# Patient Record
Sex: FEMALE | Race: OTHER | HISPANIC OR LATINO
[De-identification: names, ages, dates, MRNs, and addresses within clinical notes are randomized per-mention and may not be internally consistent; named-entity substitution may affect disease eponyms.]

---

## 2017-01-19 ENCOUNTER — APPOINTMENT (OUTPATIENT)
Dept: NEUROSURGERY | Facility: HOSPITAL | Age: 57
End: 2017-01-19

## 2017-01-19 ENCOUNTER — OTHER (OUTPATIENT)
Age: 57
End: 2017-01-19

## 2017-01-25 ENCOUNTER — APPOINTMENT (OUTPATIENT)
Dept: CT IMAGING | Facility: CLINIC | Age: 57
End: 2017-01-25

## 2017-01-25 ENCOUNTER — OUTPATIENT (OUTPATIENT)
Dept: OUTPATIENT SERVICES | Facility: HOSPITAL | Age: 57
LOS: 1 days | End: 2017-01-25
Payer: MEDICAID

## 2017-01-25 VITALS
DIASTOLIC BLOOD PRESSURE: 84 MMHG | SYSTOLIC BLOOD PRESSURE: 135 MMHG | HEART RATE: 96 BPM | OXYGEN SATURATION: 100 % | HEIGHT: 61.81 IN | RESPIRATION RATE: 18 BRPM | TEMPERATURE: 98 F | WEIGHT: 132.28 LBS

## 2017-01-25 DIAGNOSIS — Z98.891 HISTORY OF UTERINE SCAR FROM PREVIOUS SURGERY: Chronic | ICD-10-CM

## 2017-01-25 DIAGNOSIS — Z78.9 OTHER SPECIFIED HEALTH STATUS: ICD-10-CM

## 2017-01-25 DIAGNOSIS — S09.91XA UNSPECIFIED INJURY OF EAR, INITIAL ENCOUNTER: Chronic | ICD-10-CM

## 2017-01-25 DIAGNOSIS — M47.812 SPONDYLOSIS WITHOUT MYELOPATHY OR RADICULOPATHY, CERVICAL REGION: ICD-10-CM

## 2017-01-25 DIAGNOSIS — Z01.818 ENCOUNTER FOR OTHER PREPROCEDURAL EXAMINATION: ICD-10-CM

## 2017-01-25 LAB
ANION GAP SERPL CALC-SCNC: 18 MMOL/L — HIGH (ref 5–17)
BLD GP AB SCN SERPL QL: NEGATIVE — SIGNIFICANT CHANGE UP
BUN SERPL-MCNC: 12 MG/DL — SIGNIFICANT CHANGE UP (ref 7–23)
CALCIUM SERPL-MCNC: 10.3 MG/DL — SIGNIFICANT CHANGE UP (ref 8.4–10.5)
CHLORIDE SERPL-SCNC: 103 MMOL/L — SIGNIFICANT CHANGE UP (ref 96–108)
CO2 SERPL-SCNC: 21 MMOL/L — LOW (ref 22–31)
CREAT SERPL-MCNC: 0.74 MG/DL — SIGNIFICANT CHANGE UP (ref 0.5–1.3)
GLUCOSE SERPL-MCNC: 114 MG/DL — HIGH (ref 70–99)
HCT VFR BLD CALC: 36.3 % — SIGNIFICANT CHANGE UP (ref 34.5–45)
HGB BLD-MCNC: 12.1 G/DL — SIGNIFICANT CHANGE UP (ref 11.5–15.5)
MCHC RBC-ENTMCNC: 29.5 PG — SIGNIFICANT CHANGE UP (ref 27–34)
MCHC RBC-ENTMCNC: 33.3 GM/DL — SIGNIFICANT CHANGE UP (ref 32–36)
MCV RBC AUTO: 88.5 FL — SIGNIFICANT CHANGE UP (ref 80–100)
PLATELET # BLD AUTO: 301 K/UL — SIGNIFICANT CHANGE UP (ref 150–400)
POTASSIUM SERPL-MCNC: 4.4 MMOL/L — SIGNIFICANT CHANGE UP (ref 3.5–5.3)
POTASSIUM SERPL-SCNC: 4.4 MMOL/L — SIGNIFICANT CHANGE UP (ref 3.5–5.3)
RBC # BLD: 4.1 M/UL — SIGNIFICANT CHANGE UP (ref 3.8–5.2)
RBC # FLD: 13.1 % — SIGNIFICANT CHANGE UP (ref 10.3–14.5)
RH IG SCN BLD-IMP: POSITIVE — SIGNIFICANT CHANGE UP
SODIUM SERPL-SCNC: 142 MMOL/L — SIGNIFICANT CHANGE UP (ref 135–145)
WBC # BLD: 3.77 K/UL — LOW (ref 3.8–10.5)
WBC # FLD AUTO: 3.77 K/UL — LOW (ref 3.8–10.5)

## 2017-01-25 PROCEDURE — 86901 BLOOD TYPING SEROLOGIC RH(D): CPT

## 2017-01-25 PROCEDURE — 72125 CT NECK SPINE W/O DYE: CPT

## 2017-01-25 PROCEDURE — 86900 BLOOD TYPING SEROLOGIC ABO: CPT

## 2017-01-25 PROCEDURE — 85027 COMPLETE CBC AUTOMATED: CPT

## 2017-01-25 PROCEDURE — 80048 BASIC METABOLIC PNL TOTAL CA: CPT

## 2017-01-25 PROCEDURE — 86850 RBC ANTIBODY SCREEN: CPT

## 2017-01-25 RX ORDER — FLUOXETINE HCL 10 MG
1 CAPSULE ORAL
Qty: 0 | Refills: 0 | COMMUNITY

## 2017-01-25 NOTE — H&P PST ADULT - NSANTHOSAYNRD_GEN_A_CORE
No. ASIYA screening performed.  STOP BANG Legend: 0-2 = LOW Risk; 3-4 = INTERMEDIATE Risk; 5-8 = HIGH Risk

## 2017-01-25 NOTE — H&P PST ADULT - PROBLEM SELECTOR PLAN 2
pt speaks Guinean, pt refused hospital telephone , interpreted by daughter Mariananatalie Cotton [phone]

## 2017-01-27 ENCOUNTER — APPOINTMENT (OUTPATIENT)
Dept: NEUROSURGERY | Facility: HOSPITAL | Age: 57
End: 2017-01-27

## 2017-01-28 ENCOUNTER — INPATIENT (INPATIENT)
Facility: HOSPITAL | Age: 57
LOS: 0 days | Discharge: HOME | DRG: 473 | End: 2017-01-29
Attending: STUDENT IN AN ORGANIZED HEALTH CARE EDUCATION/TRAINING PROGRAM | Admitting: STUDENT IN AN ORGANIZED HEALTH CARE EDUCATION/TRAINING PROGRAM
Payer: MEDICAID

## 2017-01-28 VITALS — WEIGHT: 132.28 LBS | HEIGHT: 61.81 IN

## 2017-01-28 DIAGNOSIS — Z98.891 HISTORY OF UTERINE SCAR FROM PREVIOUS SURGERY: Chronic | ICD-10-CM

## 2017-01-28 DIAGNOSIS — M47.812 SPONDYLOSIS WITHOUT MYELOPATHY OR RADICULOPATHY, CERVICAL REGION: ICD-10-CM

## 2017-01-28 DIAGNOSIS — S09.91XA UNSPECIFIED INJURY OF EAR, INITIAL ENCOUNTER: Chronic | ICD-10-CM

## 2017-01-28 PROBLEM — E03.9 HYPOTHYROIDISM, UNSPECIFIED: Chronic | Status: ACTIVE | Noted: 2017-01-25

## 2017-01-28 PROBLEM — F32.9 MAJOR DEPRESSIVE DISORDER, SINGLE EPISODE, UNSPECIFIED: Chronic | Status: ACTIVE | Noted: 2017-01-25

## 2017-01-28 PROBLEM — M19.90 UNSPECIFIED OSTEOARTHRITIS, UNSPECIFIED SITE: Chronic | Status: ACTIVE | Noted: 2017-01-25

## 2017-01-28 PROBLEM — F41.9 ANXIETY DISORDER, UNSPECIFIED: Chronic | Status: ACTIVE | Noted: 2017-01-25

## 2017-01-28 PROCEDURE — 22552 ARTHRD ANT NTRBD CERVICAL EA: CPT

## 2017-01-28 PROCEDURE — 22551 ARTHRD ANT NTRBDY CERVICAL: CPT

## 2017-01-28 PROCEDURE — 22853 INSJ BIOMECHANICAL DEVICE: CPT

## 2017-01-28 RX ORDER — DOCUSATE SODIUM 100 MG
100 CAPSULE ORAL THREE TIMES A DAY
Qty: 0 | Refills: 0 | Status: DISCONTINUED | OUTPATIENT
Start: 2017-01-28 | End: 2017-01-29

## 2017-01-28 RX ORDER — ACETAMINOPHEN 500 MG
1 TABLET ORAL
Qty: 0 | Refills: 0 | COMMUNITY

## 2017-01-28 RX ORDER — DIAZEPAM 5 MG
1 TABLET ORAL
Qty: 0 | Refills: 0 | COMMUNITY

## 2017-01-28 RX ORDER — ACETAMINOPHEN 500 MG
650 TABLET ORAL EVERY 6 HOURS
Qty: 0 | Refills: 0 | Status: DISCONTINUED | OUTPATIENT
Start: 2017-01-28 | End: 2017-01-29

## 2017-01-28 RX ORDER — CEFAZOLIN SODIUM 1 G
2000 VIAL (EA) INJECTION EVERY 8 HOURS
Qty: 0 | Refills: 0 | Status: COMPLETED | OUTPATIENT
Start: 2017-01-28 | End: 2017-01-29

## 2017-01-28 RX ORDER — DIAZEPAM 5 MG
5 TABLET ORAL
Qty: 0 | Refills: 0 | Status: DISCONTINUED | OUTPATIENT
Start: 2017-01-28 | End: 2017-01-29

## 2017-01-28 RX ORDER — ZOLPIDEM TARTRATE 10 MG/1
5 TABLET ORAL AT BEDTIME
Qty: 0 | Refills: 0 | Status: DISCONTINUED | OUTPATIENT
Start: 2017-01-28 | End: 2017-01-29

## 2017-01-28 RX ORDER — HYDROMORPHONE HYDROCHLORIDE 2 MG/ML
1 INJECTION INTRAMUSCULAR; INTRAVENOUS; SUBCUTANEOUS EVERY 6 HOURS
Qty: 0 | Refills: 0 | Status: DISCONTINUED | OUTPATIENT
Start: 2017-01-28 | End: 2017-01-29

## 2017-01-28 RX ORDER — ONDANSETRON 8 MG/1
4 TABLET, FILM COATED ORAL ONCE
Qty: 0 | Refills: 0 | Status: DISCONTINUED | OUTPATIENT
Start: 2017-01-28 | End: 2017-01-28

## 2017-01-28 RX ORDER — LEVOTHYROXINE SODIUM 125 MCG
88 TABLET ORAL DAILY
Qty: 0 | Refills: 0 | Status: DISCONTINUED | OUTPATIENT
Start: 2017-01-28 | End: 2017-01-29

## 2017-01-28 RX ORDER — DEXTROSE MONOHYDRATE, SODIUM CHLORIDE, AND POTASSIUM CHLORIDE 50; .745; 4.5 G/1000ML; G/1000ML; G/1000ML
1000 INJECTION, SOLUTION INTRAVENOUS
Qty: 0 | Refills: 0 | Status: DISCONTINUED | OUTPATIENT
Start: 2017-01-28 | End: 2017-01-29

## 2017-01-28 RX ORDER — SODIUM CHLORIDE 9 MG/ML
3 INJECTION INTRAMUSCULAR; INTRAVENOUS; SUBCUTANEOUS EVERY 8 HOURS
Qty: 0 | Refills: 0 | Status: DISCONTINUED | OUTPATIENT
Start: 2017-01-28 | End: 2017-01-28

## 2017-01-28 RX ORDER — SENNA PLUS 8.6 MG/1
2 TABLET ORAL AT BEDTIME
Qty: 0 | Refills: 0 | Status: DISCONTINUED | OUTPATIENT
Start: 2017-01-28 | End: 2017-01-29

## 2017-01-28 RX ORDER — FLUOXETINE HCL 10 MG
1 CAPSULE ORAL
Qty: 0 | Refills: 0 | COMMUNITY

## 2017-01-28 RX ORDER — CEFAZOLIN SODIUM 1 G
2000 VIAL (EA) INJECTION ONCE
Qty: 0 | Refills: 0 | Status: DISCONTINUED | OUTPATIENT
Start: 2017-01-28 | End: 2017-01-28

## 2017-01-28 RX ORDER — HYDROMORPHONE HYDROCHLORIDE 2 MG/ML
0.5 INJECTION INTRAMUSCULAR; INTRAVENOUS; SUBCUTANEOUS
Qty: 0 | Refills: 0 | Status: DISCONTINUED | OUTPATIENT
Start: 2017-01-28 | End: 2017-01-28

## 2017-01-28 RX ORDER — ZOLPIDEM TARTRATE 10 MG/1
1 TABLET ORAL
Qty: 0 | Refills: 0 | COMMUNITY

## 2017-01-28 RX ORDER — LEVOTHYROXINE SODIUM 125 MCG
1 TABLET ORAL
Qty: 0 | Refills: 0 | COMMUNITY

## 2017-01-28 RX ADMIN — Medication 100 MILLIGRAM(S): at 16:12

## 2017-01-28 RX ADMIN — HYDROMORPHONE HYDROCHLORIDE 0.5 MILLIGRAM(S): 2 INJECTION INTRAMUSCULAR; INTRAVENOUS; SUBCUTANEOUS at 14:33

## 2017-01-28 RX ADMIN — Medication 100 MILLIGRAM(S): at 22:00

## 2017-01-28 RX ADMIN — SENNA PLUS 2 TABLET(S): 8.6 TABLET ORAL at 21:40

## 2017-01-28 RX ADMIN — Medication 100 MILLIGRAM(S): at 21:40

## 2017-01-28 RX ADMIN — DEXTROSE MONOHYDRATE, SODIUM CHLORIDE, AND POTASSIUM CHLORIDE 75 MILLILITER(S): 50; .745; 4.5 INJECTION, SOLUTION INTRAVENOUS at 13:54

## 2017-01-28 RX ADMIN — HYDROMORPHONE HYDROCHLORIDE 0.5 MILLIGRAM(S): 2 INJECTION INTRAMUSCULAR; INTRAVENOUS; SUBCUTANEOUS at 14:01

## 2017-01-28 RX ADMIN — HYDROMORPHONE HYDROCHLORIDE 0.5 MILLIGRAM(S): 2 INJECTION INTRAMUSCULAR; INTRAVENOUS; SUBCUTANEOUS at 20:30

## 2017-01-28 RX ADMIN — HYDROMORPHONE HYDROCHLORIDE 0.5 MILLIGRAM(S): 2 INJECTION INTRAMUSCULAR; INTRAVENOUS; SUBCUTANEOUS at 16:48

## 2017-01-28 RX ADMIN — HYDROMORPHONE HYDROCHLORIDE 0.5 MILLIGRAM(S): 2 INJECTION INTRAMUSCULAR; INTRAVENOUS; SUBCUTANEOUS at 16:15

## 2017-01-28 RX ADMIN — Medication 5 MILLIGRAM(S): at 18:27

## 2017-01-28 RX ADMIN — HYDROMORPHONE HYDROCHLORIDE 0.5 MILLIGRAM(S): 2 INJECTION INTRAMUSCULAR; INTRAVENOUS; SUBCUTANEOUS at 21:55

## 2017-01-28 RX ADMIN — HYDROMORPHONE HYDROCHLORIDE 0.5 MILLIGRAM(S): 2 INJECTION INTRAMUSCULAR; INTRAVENOUS; SUBCUTANEOUS at 21:41

## 2017-01-28 RX ADMIN — HYDROMORPHONE HYDROCHLORIDE 0.5 MILLIGRAM(S): 2 INJECTION INTRAMUSCULAR; INTRAVENOUS; SUBCUTANEOUS at 18:30

## 2017-01-29 ENCOUNTER — TRANSCRIPTION ENCOUNTER (OUTPATIENT)
Age: 57
End: 2017-01-29

## 2017-01-29 VITALS
TEMPERATURE: 99 F | OXYGEN SATURATION: 100 % | SYSTOLIC BLOOD PRESSURE: 120 MMHG | HEART RATE: 95 BPM | RESPIRATION RATE: 17 BRPM | DIASTOLIC BLOOD PRESSURE: 77 MMHG

## 2017-01-29 LAB
ANION GAP SERPL CALC-SCNC: 16 MMOL/L — SIGNIFICANT CHANGE UP (ref 5–17)
BASOPHILS # BLD AUTO: 0 K/UL — SIGNIFICANT CHANGE UP (ref 0–0.2)
BASOPHILS NFR BLD AUTO: 0 % — SIGNIFICANT CHANGE UP (ref 0–2)
BUN SERPL-MCNC: 12 MG/DL — SIGNIFICANT CHANGE UP (ref 7–23)
CALCIUM SERPL-MCNC: 9.2 MG/DL — SIGNIFICANT CHANGE UP (ref 8.4–10.5)
CHLORIDE SERPL-SCNC: 105 MMOL/L — SIGNIFICANT CHANGE UP (ref 96–108)
CO2 SERPL-SCNC: 20 MMOL/L — LOW (ref 22–31)
CREAT SERPL-MCNC: 0.69 MG/DL — SIGNIFICANT CHANGE UP (ref 0.5–1.3)
EOSINOPHIL # BLD AUTO: 0 K/UL — SIGNIFICANT CHANGE UP (ref 0–0.5)
EOSINOPHIL NFR BLD AUTO: 0 % — SIGNIFICANT CHANGE UP (ref 0–6)
GLUCOSE SERPL-MCNC: 112 MG/DL — HIGH (ref 70–99)
HCT VFR BLD CALC: 31.8 % — LOW (ref 34.5–45)
HGB BLD-MCNC: 10.1 G/DL — LOW (ref 11.5–15.5)
IMM GRANULOCYTES NFR BLD AUTO: 0.2 % — SIGNIFICANT CHANGE UP (ref 0–1.5)
LYMPHOCYTES # BLD AUTO: 1.15 K/UL — SIGNIFICANT CHANGE UP (ref 1–3.3)
LYMPHOCYTES # BLD AUTO: 12.5 % — LOW (ref 13–44)
MCHC RBC-ENTMCNC: 28.9 PG — SIGNIFICANT CHANGE UP (ref 27–34)
MCHC RBC-ENTMCNC: 31.8 GM/DL — LOW (ref 32–36)
MCV RBC AUTO: 91.1 FL — SIGNIFICANT CHANGE UP (ref 80–100)
MONOCYTES # BLD AUTO: 0.62 K/UL — SIGNIFICANT CHANGE UP (ref 0–0.9)
MONOCYTES NFR BLD AUTO: 6.7 % — SIGNIFICANT CHANGE UP (ref 2–14)
NEUTROPHILS # BLD AUTO: 7.4 K/UL — SIGNIFICANT CHANGE UP (ref 1.8–7.4)
NEUTROPHILS NFR BLD AUTO: 80.6 % — HIGH (ref 43–77)
PLATELET # BLD AUTO: 239 K/UL — SIGNIFICANT CHANGE UP (ref 150–400)
POTASSIUM SERPL-MCNC: 4.6 MMOL/L — SIGNIFICANT CHANGE UP (ref 3.5–5.3)
POTASSIUM SERPL-SCNC: 4.6 MMOL/L — SIGNIFICANT CHANGE UP (ref 3.5–5.3)
RBC # BLD: 3.49 M/UL — LOW (ref 3.8–5.2)
RBC # FLD: 13.6 % — SIGNIFICANT CHANGE UP (ref 10.3–14.5)
SODIUM SERPL-SCNC: 141 MMOL/L — SIGNIFICANT CHANGE UP (ref 135–145)
WBC # BLD: 9.19 K/UL — SIGNIFICANT CHANGE UP (ref 3.8–10.5)
WBC # FLD AUTO: 9.19 K/UL — SIGNIFICANT CHANGE UP (ref 3.8–10.5)

## 2017-01-29 PROCEDURE — 72040 X-RAY EXAM NECK SPINE 2-3 VW: CPT | Mod: 26

## 2017-01-29 RX ORDER — BENZOCAINE AND MENTHOL 5; 1 G/100ML; G/100ML
1 LIQUID ORAL ONCE
Qty: 0 | Refills: 0 | Status: COMPLETED | OUTPATIENT
Start: 2017-01-29 | End: 2017-01-29

## 2017-01-29 RX ORDER — OXYCODONE HYDROCHLORIDE 5 MG/1
1 TABLET ORAL
Qty: 28 | Refills: 0 | OUTPATIENT
Start: 2017-01-29 | End: 2017-02-05

## 2017-01-29 RX ORDER — DOCUSATE SODIUM 100 MG
1 CAPSULE ORAL
Qty: 0 | Refills: 0 | COMMUNITY
Start: 2017-01-29

## 2017-01-29 RX ADMIN — Medication 100 MILLIGRAM(S): at 06:44

## 2017-01-29 RX ADMIN — HYDROMORPHONE HYDROCHLORIDE 1 MILLIGRAM(S): 2 INJECTION INTRAMUSCULAR; INTRAVENOUS; SUBCUTANEOUS at 02:30

## 2017-01-29 RX ADMIN — HYDROMORPHONE HYDROCHLORIDE 1 MILLIGRAM(S): 2 INJECTION INTRAMUSCULAR; INTRAVENOUS; SUBCUTANEOUS at 02:04

## 2017-01-29 RX ADMIN — Medication 5 MILLIGRAM(S): at 06:44

## 2017-01-29 RX ADMIN — Medication 650 MILLIGRAM(S): at 15:30

## 2017-01-29 RX ADMIN — Medication 88 MICROGRAM(S): at 06:44

## 2017-01-29 RX ADMIN — Medication 650 MILLIGRAM(S): at 16:00

## 2017-01-29 RX ADMIN — BENZOCAINE AND MENTHOL 1 LOZENGE: 5; 1 LIQUID ORAL at 15:30

## 2017-01-29 NOTE — DISCHARGE NOTE ADULT - CARE PROVIDER_API CALL
Tim Timmons), Neurosurgery  General  [address] [place] [address]  Phone: [phone]  Fax: [phone]

## 2017-01-29 NOTE — DISCHARGE NOTE ADULT - PATIENT PORTAL LINK FT
“You can access the FollowHealth Patient Portal, offered by [facility], by registering with the following website: [email or web address]”

## 2017-01-29 NOTE — DISCHARGE NOTE ADULT - DURABLE MEDICAL EQUIPMENT AGENCY
[facility] SURGICAL [phone]  ROLLING WALKER DELIVERED TO PATIENT'S ROOM PRIOR TO DISCHARGE

## 2017-01-29 NOTE — DISCHARGE NOTE ADULT - NS AS ACTIVITY OBS
Do not drive or operate machinery/Do not make important decisions/No Heavy lifting/straining/keep surgical site clean and dry/Showering allowed

## 2017-01-29 NOTE — DISCHARGE NOTE ADULT - MEDICATION SUMMARY - MEDICATIONS TO TAKE
I will START or STAY ON the medications listed below when I get home from the hospital:    Rolling Walker   -- 1 use as directed for support   -- Indication: For use as directed    acetaminophen-oxyCODONE 325 mg-5 mg oral tablet  -- 1-2 tab(s) by mouth every 6 hours, As Needed, Moderate Pain (4 - 6) MDD:6 tabs  -- Indication: For pain    Tylenol 8 HR Arthritis Pain 650 mg oral tablet, extended release  -- 1 tab(s) by mouth once a day  -- Indication: For pain/fevers    diazePAM 5 mg oral tablet  -- 1 tab(s) by mouth 2 times a day  -- Indication: For Spasm    FLUoxetine 40 mg oral capsule  -- 1 cap(s) by mouth once a day (at bedtime)  -- Indication: For depression    Ambien 10 mg oral tablet  -- 1 tab(s) by mouth once a day (at bedtime)  -- Indication: For insomnia    docusate sodium 100 mg oral capsule  -- 1 cap(s) by mouth 3 times a day  -- Indication: For Stool softener    levothyroxine 88 mcg (0.088 mg) oral capsule  -- 1 cap(s) by mouth once a day  -- Indication: For hypothyroid

## 2017-01-29 NOTE — DISCHARGE NOTE ADULT - HOME CARE AGENCY
[facility] [phone]  RN WILL CALL PRIOR TO VISIT  SERVICES TO START 1-2 DAYS FOLLOWING DISCHARGE  PHYSICAL THERAPY TO FOLLOW

## 2017-01-29 NOTE — DISCHARGE NOTE ADULT - CARE PROVIDERS DIRECT ADDRESSES
,[email or web address]

## 2017-01-29 NOTE — PHYSICAL THERAPY INITIAL EVALUATION ADULT - PLANNED THERAPY INTERVENTIONS, PT EVAL
balance training/STAIRS TRAINING/gait training/strengthening/bed mobility training/transfer training

## 2017-01-29 NOTE — DISCHARGE NOTE ADULT - OTHER SIGNIFICANT FINDINGS
55 y/o female w. PMHx: Anxiety  Depression  Hypothyroid  OA (osteoarthritis). Presented with Neck and right Shoulder pain through same day admission on 1/29 s/p C4-C7 ACDF  Post operative patients Acosta and Hemovac was removed. Patient states felt much better after surgery. She was evaluated by Physical therapy (PT) recommended Home with Home PT and rolling walker. On the day of discharge patient was stable and cleared by Neurosurgery. Advised patient to call and make appointment for follow up with a Neurosurgeon x 2 weeks after discharge (at note since patient was discharged on Sunday no f/u appointment was made for the patient).

## 2017-01-29 NOTE — PHYSICAL THERAPY INITIAL EVALUATION ADULT - GENERAL OBSERVATIONS, REHAB EVAL
Pt received supine in bed in NAD, VSS, + UE IV, + accordian drain ant neck; + dressing ant neck C/D/I.

## 2017-01-29 NOTE — DISCHARGE NOTE ADULT - CARE PLAN
Principal Discharge DX:	Cervical spondylosis  Goal:	1/29/16 s/p ACDF  C4-C7  Instructions for follow-up, activity and diet:	Please call Make appointment x 2 weeks Dr. Timmons (neurosurgeon)  Secondary Diagnosis:	Anxiety  Secondary Diagnosis:	Depression  Secondary Diagnosis:	Injury of ear canal

## 2017-01-29 NOTE — PHYSICAL THERAPY INITIAL EVALUATION ADULT - ACTIVE RANGE OF MOTION EXAMINATION, REHAB EVAL
bilateral  lower extremity Active ROM was WFL (within functional limits)/spinal precautions obsserved/bilateral upper extremity Active ROM was WFL (within functional limits)

## 2017-02-01 ENCOUNTER — OTHER (OUTPATIENT)
Age: 57
End: 2017-02-01

## 2017-02-14 ENCOUNTER — APPOINTMENT (OUTPATIENT)
Dept: NEUROSURGERY | Facility: CLINIC | Age: 57
End: 2017-02-14

## 2017-02-14 VITALS
WEIGHT: 126 LBS | DIASTOLIC BLOOD PRESSURE: 84 MMHG | SYSTOLIC BLOOD PRESSURE: 133 MMHG | HEIGHT: 62 IN | HEART RATE: 101 BPM | BODY MASS INDEX: 23.19 KG/M2

## 2017-03-07 ENCOUNTER — APPOINTMENT (OUTPATIENT)
Dept: NEUROSURGERY | Facility: CLINIC | Age: 57
End: 2017-03-07

## 2017-03-07 VITALS
DIASTOLIC BLOOD PRESSURE: 78 MMHG | BODY MASS INDEX: 23.19 KG/M2 | HEART RATE: 92 BPM | SYSTOLIC BLOOD PRESSURE: 121 MMHG | HEIGHT: 62 IN | WEIGHT: 126 LBS

## 2017-03-28 NOTE — PRE-OP CHECKLIST - LOOSE TEETH
PT DAILY TREATMENT NOTE - [facility]     Patient Name: Rylan Rodríguez  Date:3/28/2017  : 1949  [x]  Patient  Verified  Payor: [place] MEDICARE / Plan: [place] MEDICARE PART A & B / Product Type: Medicare /    In time:0900  Out time:0950  Total Treatment Time (min): 50  Total Timed Codes (min): 40  1:1 Treatment Time ( [address] only): 40   Visit #: 8 of 8    Treatment Area: Foot pain, right [M79.221]    SUBJECTIVE  Pain Level (0-10 scale): 0/10  Any medication changes, allergies to medications, adverse drug reactions, diagnosis change, or new procedure performed?: [x] No    [] Yes (see summary sheet for update)  Subjective functional status/changes:   [] No changes reported  I walked a half a mile and had some pain and swelling.     OBJECTIVE    Modality rationale: decrease edema, decrease inflammation and decrease pain to improve the patients ability to normalize gait and ADL's   Min Type Additional Details    [] Estim:  []Unatt       []IFC  []Premod                        []Other:  []w/ice   []w/heat  Position:  Location:    [] Estim: []Att    []TENS instruct  []NMES                    []Other:  []w/US   []w/ice   []w/heat  Position:  Location:    []  Traction: [] Cervical       []Lumbar                       [] Prone          []Supine                       []Intermittent   []Continuous Lbs:  [] before manual  [] after manual    []  Ultrasound: []Continuous   [] Pulsed                           []1MHz   []3MHz W/cm2:  Location:    []  Iontophoresis with dexamethasone         Location: [] Take home patch   [] In clinic    []  Ice     []  heat  []  Ice massage  []  Laser   []  Anodyne Position:  Location:    []  Laser with stim  []  Other:  Position:  Location:   10 [x]  Vasopneumatic Device right ankle/foot Pressure:       [] lo [x] med [] hi   Temperature: [x] lo [] med [] hi   [x] Skin assessment post-treatment:  [x]intact []redness- no adverse reaction    []redness  adverse reaction:      min []Eval []Re-Eval       32 min Therapeutic Exercise:  [x] See flow sheet :   Rationale: increase ROM, increase strength and improve coordination to improve the patients ability to improve gait and function     min Therapeutic Activity:  []  See flow sheet :        8 min Neuromuscular Re-education:  [x]  See flow sheet :added SLS    Rationale: increase strength, improve coordination, improve balance and increase proprioception  to improve the patients ability to improve balance and safety with gait     min Manual Therapy:          min Gait Training:  ___ feet with ___ device on level surfaces with ___ level of assist   Rationale: With   [] TE   [] TA   [] neuro   [] other: Patient Education: [x] Review HEP    [] Progressed/Changed HEP based on:   [] positioning   [] body mechanics   [] transfers   [] heat/ice application    [] other:      Other Objective/Functional Measures:      Pain Level (0-10 scale) post treatment: 0/10    ASSESSMENT/Changes in Function: Gait normalizing with pt out of ambulatory boot with minimal edema at right Achilles. Patient will continue to benefit from skilled PT services to modify and progress therapeutic interventions, address functional mobility deficits, address ROM deficits, address strength deficits, analyze and address soft tissue restrictions, analyze and cue movement patterns, analyze and modify body mechanics/ergonomics, assess and modify postural abnormalities, address imbalance/dizziness and instruct in home and community integration to attain remaining goals. []  See Plan of Care  [x]  See progress note/recertification. Pt would benefit from continued PT to address right ankle stability and dynamic balance  []  See Discharge Summary         Progress towards goals / Updated goals:  Short Term Goals: To be accomplished in 2 weeks:  1. Patient will be independent and compliant with HEP to achieve other goals.   Status at eval: not independent/compliant  Current: met 3/07/17, updated with Tband       2. Increase right ankle AROM by >/= 10 degrees all planes to normalize function. Status at eval DF: 4, PF: 32, inversion: 24, eversion: 10  Current: DF:18, PF:13, INV:10, EV: 18      Long Term Goals: To be accomplished in 4 weeks:  1. Improve FOTO score to >/= 59/100 to indicate decreased pain with ADL's. Status at eval: 55/100  Current: met: 73/100, retest visit 10      2. Pt will transition out of boot into normal shoewear on consistent daily basis with normalizing gait. Status at eval: ambulating in immobilizer boot on consistent daily basis  Current: met: pt ambulating in normal shoewear with normalizing gait      3. Increase right great toe AROM flex and ext by >/= 10 degrees each to normalize gait.   Status at eval: ext: 40, flex: 25  Current: progressing       PLAN  [x]  Upgrade activities as tolerated     [x]  Continue plan of care  []  Update interventions per flow sheet       []  Discharge due to:_  [x]  Other:_ PN next visit to progress dynamic balance and ankle stability     Maria Guadalupe Leo PTA 3/28/2017  9:21 AM    Future Appointments  Date Time Provider Cyrus Santamaria   3/30/2017 10:00 AM [facility]

## 2017-04-03 ENCOUNTER — RX RENEWAL (OUTPATIENT)
Age: 57
End: 2017-04-03

## 2017-04-24 ENCOUNTER — OUTPATIENT (OUTPATIENT)
Dept: OUTPATIENT SERVICES | Facility: HOSPITAL | Age: 57
LOS: 1 days | End: 2017-04-24
Payer: MEDICAID

## 2017-04-24 ENCOUNTER — APPOINTMENT (OUTPATIENT)
Dept: INTERNAL MEDICINE | Facility: CLINIC | Age: 57
End: 2017-04-24

## 2017-04-24 VITALS
DIASTOLIC BLOOD PRESSURE: 80 MMHG | HEIGHT: 62 IN | WEIGHT: 130 LBS | SYSTOLIC BLOOD PRESSURE: 120 MMHG | BODY MASS INDEX: 23.92 KG/M2

## 2017-04-24 DIAGNOSIS — I10 ESSENTIAL (PRIMARY) HYPERTENSION: ICD-10-CM

## 2017-04-24 DIAGNOSIS — Z98.891 HISTORY OF UTERINE SCAR FROM PREVIOUS SURGERY: Chronic | ICD-10-CM

## 2017-04-24 DIAGNOSIS — S09.91XA UNSPECIFIED INJURY OF EAR, INITIAL ENCOUNTER: Chronic | ICD-10-CM

## 2017-04-24 PROCEDURE — G0463: CPT

## 2017-04-26 ENCOUNTER — APPOINTMENT (OUTPATIENT)
Dept: OPHTHALMOLOGY | Facility: CLINIC | Age: 57
End: 2017-04-26

## 2017-04-26 DIAGNOSIS — H02.402 UNSPECIFIED PTOSIS OF LEFT EYELID: ICD-10-CM

## 2017-04-26 DIAGNOSIS — Z87.898 PERSONAL HISTORY OF OTHER SPECIFIED CONDITIONS: ICD-10-CM

## 2017-05-03 ENCOUNTER — APPOINTMENT (OUTPATIENT)
Dept: NEUROLOGY | Facility: CLINIC | Age: 57
End: 2017-05-03

## 2017-05-03 VITALS
SYSTOLIC BLOOD PRESSURE: 125 MMHG | HEIGHT: 62 IN | BODY MASS INDEX: 24.29 KG/M2 | WEIGHT: 132 LBS | HEART RATE: 80 BPM | DIASTOLIC BLOOD PRESSURE: 80 MMHG

## 2017-05-03 DIAGNOSIS — M54.12 RADICULOPATHY, CERVICAL REGION: ICD-10-CM

## 2017-05-07 ENCOUNTER — FORM ENCOUNTER (OUTPATIENT)
Age: 57
End: 2017-05-07

## 2017-05-08 ENCOUNTER — APPOINTMENT (OUTPATIENT)
Dept: ULTRASOUND IMAGING | Facility: IMAGING CENTER | Age: 57
End: 2017-05-08

## 2017-05-08 ENCOUNTER — OUTPATIENT (OUTPATIENT)
Dept: OUTPATIENT SERVICES | Facility: HOSPITAL | Age: 57
LOS: 1 days | End: 2017-05-08
Payer: MEDICAID

## 2017-05-08 ENCOUNTER — APPOINTMENT (OUTPATIENT)
Dept: MAMMOGRAPHY | Facility: IMAGING CENTER | Age: 57
End: 2017-05-08

## 2017-05-08 DIAGNOSIS — Z98.891 HISTORY OF UTERINE SCAR FROM PREVIOUS SURGERY: Chronic | ICD-10-CM

## 2017-05-08 DIAGNOSIS — S09.91XA UNSPECIFIED INJURY OF EAR, INITIAL ENCOUNTER: Chronic | ICD-10-CM

## 2017-05-08 DIAGNOSIS — N64.59 OTHER SIGNS AND SYMPTOMS IN BREAST: ICD-10-CM

## 2017-05-08 PROCEDURE — 76641 ULTRASOUND BREAST COMPLETE: CPT

## 2017-05-08 PROCEDURE — 76642 ULTRASOUND BREAST LIMITED: CPT

## 2017-05-08 PROCEDURE — 77066 DX MAMMO INCL CAD BI: CPT

## 2017-05-08 PROCEDURE — G0279: CPT

## 2017-05-09 ENCOUNTER — APPOINTMENT (OUTPATIENT)
Dept: NEUROSURGERY | Facility: CLINIC | Age: 57
End: 2017-05-09

## 2017-05-16 ENCOUNTER — APPOINTMENT (OUTPATIENT)
Dept: NEUROSURGERY | Facility: CLINIC | Age: 57
End: 2017-05-16

## 2017-05-16 VITALS
BODY MASS INDEX: 24.29 KG/M2 | WEIGHT: 132 LBS | SYSTOLIC BLOOD PRESSURE: 126 MMHG | HEART RATE: 76 BPM | DIASTOLIC BLOOD PRESSURE: 82 MMHG | HEIGHT: 62 IN

## 2017-06-25 ENCOUNTER — FORM ENCOUNTER (OUTPATIENT)
Age: 57
End: 2017-06-25

## 2017-06-26 ENCOUNTER — APPOINTMENT (OUTPATIENT)
Dept: MRI IMAGING | Facility: CLINIC | Age: 57
End: 2017-06-26

## 2017-06-26 ENCOUNTER — OUTPATIENT (OUTPATIENT)
Dept: OUTPATIENT SERVICES | Facility: HOSPITAL | Age: 57
LOS: 1 days | End: 2017-06-26
Payer: MEDICAID

## 2017-06-26 DIAGNOSIS — R51 HEADACHE: ICD-10-CM

## 2017-06-26 DIAGNOSIS — M54.12 RADICULOPATHY, CERVICAL REGION: ICD-10-CM

## 2017-06-26 DIAGNOSIS — S09.91XA UNSPECIFIED INJURY OF EAR, INITIAL ENCOUNTER: Chronic | ICD-10-CM

## 2017-06-26 DIAGNOSIS — Z98.891 HISTORY OF UTERINE SCAR FROM PREVIOUS SURGERY: Chronic | ICD-10-CM

## 2017-06-26 PROCEDURE — 72141 MRI NECK SPINE W/O DYE: CPT

## 2017-06-26 PROCEDURE — 70551 MRI BRAIN STEM W/O DYE: CPT

## 2017-06-26 PROCEDURE — 70544 MR ANGIOGRAPHY HEAD W/O DYE: CPT

## 2017-06-30 DIAGNOSIS — M47.812 SPONDYLOSIS WITHOUT MYELOPATHY OR RADICULOPATHY, CERVICAL REGION: ICD-10-CM

## 2017-06-30 DIAGNOSIS — R51 HEADACHE: ICD-10-CM

## 2017-06-30 DIAGNOSIS — M47.816 SPONDYLOSIS WITHOUT MYELOPATHY OR RADICULOPATHY, LUMBAR REGION: ICD-10-CM

## 2017-06-30 DIAGNOSIS — M50.30 OTHER CERVICAL DISC DEGENERATION, UNSPECIFIED CERVICAL REGION: ICD-10-CM

## 2017-07-11 ENCOUNTER — APPOINTMENT (OUTPATIENT)
Dept: NEUROSURGERY | Facility: CLINIC | Age: 57
End: 2017-07-11

## 2017-08-01 ENCOUNTER — OUTPATIENT (OUTPATIENT)
Dept: OUTPATIENT SERVICES | Facility: HOSPITAL | Age: 57
LOS: 1 days | End: 2017-08-01
Payer: MEDICAID

## 2017-08-01 DIAGNOSIS — Z98.891 HISTORY OF UTERINE SCAR FROM PREVIOUS SURGERY: Chronic | ICD-10-CM

## 2017-08-01 DIAGNOSIS — S09.91XA UNSPECIFIED INJURY OF EAR, INITIAL ENCOUNTER: Chronic | ICD-10-CM

## 2017-08-02 ENCOUNTER — OUTPATIENT (OUTPATIENT)
Dept: OUTPATIENT SERVICES | Facility: HOSPITAL | Age: 57
LOS: 1 days | End: 2017-08-02
Payer: MEDICAID

## 2017-08-02 ENCOUNTER — APPOINTMENT (OUTPATIENT)
Dept: INTERNAL MEDICINE | Facility: CLINIC | Age: 57
End: 2017-08-02
Payer: MEDICAID

## 2017-08-02 VITALS
DIASTOLIC BLOOD PRESSURE: 78 MMHG | SYSTOLIC BLOOD PRESSURE: 140 MMHG | BODY MASS INDEX: 23.92 KG/M2 | WEIGHT: 130 LBS | OXYGEN SATURATION: 98 % | HEART RATE: 72 BPM | HEIGHT: 62 IN

## 2017-08-02 DIAGNOSIS — G90.2 HORNER'S SYNDROME: ICD-10-CM

## 2017-08-02 DIAGNOSIS — F32.9 MAJOR DEPRESSIVE DISORDER, SINGLE EPISODE, UNSPECIFIED: ICD-10-CM

## 2017-08-02 DIAGNOSIS — M25.572 PAIN IN LEFT ANKLE AND JOINTS OF LEFT FOOT: ICD-10-CM

## 2017-08-02 DIAGNOSIS — S09.91XA UNSPECIFIED INJURY OF EAR, INITIAL ENCOUNTER: Chronic | ICD-10-CM

## 2017-08-02 DIAGNOSIS — I10 ESSENTIAL (PRIMARY) HYPERTENSION: ICD-10-CM

## 2017-08-02 DIAGNOSIS — Z98.891 HISTORY OF UTERINE SCAR FROM PREVIOUS SURGERY: Chronic | ICD-10-CM

## 2017-08-02 PROCEDURE — G0463: CPT

## 2017-08-02 PROCEDURE — 99213 OFFICE O/P EST LOW 20 MIN: CPT | Mod: GE

## 2017-08-03 DIAGNOSIS — R69 ILLNESS, UNSPECIFIED: ICD-10-CM

## 2017-08-03 PROBLEM — G90.2: Status: ACTIVE | Noted: 2017-03-10

## 2017-08-03 LAB
ALBUMIN SERPL ELPH-MCNC: 4.9 G/DL
ALP BLD-CCNC: 82 U/L
ALT SERPL-CCNC: 18 U/L
ANION GAP SERPL CALC-SCNC: 15 MMOL/L
AST SERPL-CCNC: 19 U/L
BASOPHILS # BLD AUTO: 0.03 K/UL
BASOPHILS NFR BLD AUTO: 0.8 %
BILIRUB SERPL-MCNC: 0.2 MG/DL
BUN SERPL-MCNC: 13 MG/DL
CALCIUM SERPL-MCNC: 10.2 MG/DL
CHLORIDE SERPL-SCNC: 109 MMOL/L
CHOLEST SERPL-MCNC: 268 MG/DL
CHOLEST/HDLC SERPL: 4.9 RATIO
CO2 SERPL-SCNC: 21 MMOL/L
CREAT SERPL-MCNC: 0.76 MG/DL
EOSINOPHIL # BLD AUTO: 0.11 K/UL
EOSINOPHIL NFR BLD AUTO: 3 %
GLUCOSE SERPL-MCNC: 90 MG/DL
HBA1C MFR BLD HPLC: 5.5 %
HCT VFR BLD CALC: 36.9 %
HDLC SERPL-MCNC: 55 MG/DL
HGB BLD-MCNC: 12 G/DL
IMM GRANULOCYTES NFR BLD AUTO: 0.3 %
LDLC SERPL CALC-MCNC: 170 MG/DL
LYMPHOCYTES # BLD AUTO: 1.22 K/UL
LYMPHOCYTES NFR BLD AUTO: 32.8 %
MAN DIFF?: NORMAL
MCHC RBC-ENTMCNC: 29.3 PG
MCHC RBC-ENTMCNC: 32.5 GM/DL
MCV RBC AUTO: 90.2 FL
MONOCYTES # BLD AUTO: 0.23 K/UL
MONOCYTES NFR BLD AUTO: 6.2 %
NEUTROPHILS # BLD AUTO: 2.12 K/UL
NEUTROPHILS NFR BLD AUTO: 56.9 %
PLATELET # BLD AUTO: 316 K/UL
POTASSIUM SERPL-SCNC: 4.6 MMOL/L
PROT SERPL-MCNC: 7.8 G/DL
RBC # BLD: 4.09 M/UL
RBC # FLD: 14.1 %
SODIUM SERPL-SCNC: 145 MMOL/L
TRIGL SERPL-MCNC: 217 MG/DL
TSH SERPL-ACNC: 1.1 UIU/ML
WBC # FLD AUTO: 3.72 K/UL

## 2017-08-07 ENCOUNTER — FORM ENCOUNTER (OUTPATIENT)
Age: 57
End: 2017-08-07

## 2017-08-07 ENCOUNTER — APPOINTMENT (OUTPATIENT)
Dept: OPHTHALMOLOGY | Facility: CLINIC | Age: 57
End: 2017-08-07
Payer: MEDICAID

## 2017-08-07 PROCEDURE — 92015 DETERMINE REFRACTIVE STATE: CPT

## 2017-08-07 PROCEDURE — 92012 INTRM OPH EXAM EST PATIENT: CPT

## 2017-08-08 ENCOUNTER — OUTPATIENT (OUTPATIENT)
Dept: OUTPATIENT SERVICES | Facility: HOSPITAL | Age: 57
LOS: 1 days | End: 2017-08-08
Payer: MEDICAID

## 2017-08-08 ENCOUNTER — APPOINTMENT (OUTPATIENT)
Dept: RADIOLOGY | Facility: CLINIC | Age: 57
End: 2017-08-08

## 2017-08-08 ENCOUNTER — APPOINTMENT (OUTPATIENT)
Dept: RADIOLOGY | Facility: CLINIC | Age: 57
End: 2017-08-08
Payer: MEDICAID

## 2017-08-08 DIAGNOSIS — Z98.891 HISTORY OF UTERINE SCAR FROM PREVIOUS SURGERY: Chronic | ICD-10-CM

## 2017-08-08 DIAGNOSIS — M54.12 RADICULOPATHY, CERVICAL REGION: ICD-10-CM

## 2017-08-08 DIAGNOSIS — S09.91XA UNSPECIFIED INJURY OF EAR, INITIAL ENCOUNTER: Chronic | ICD-10-CM

## 2017-08-08 PROCEDURE — 73610 X-RAY EXAM OF ANKLE: CPT | Mod: 26,LT

## 2017-08-08 PROCEDURE — 73610 X-RAY EXAM OF ANKLE: CPT

## 2017-08-15 ENCOUNTER — APPOINTMENT (OUTPATIENT)
Dept: NEUROSURGERY | Facility: CLINIC | Age: 57
End: 2017-08-15
Payer: MEDICAID

## 2017-08-15 VITALS
BODY MASS INDEX: 23.92 KG/M2 | SYSTOLIC BLOOD PRESSURE: 150 MMHG | DIASTOLIC BLOOD PRESSURE: 79 MMHG | WEIGHT: 130 LBS | HEIGHT: 62 IN | HEART RATE: 83 BPM

## 2017-08-15 PROCEDURE — 99214 OFFICE O/P EST MOD 30 MIN: CPT

## 2017-08-16 ENCOUNTER — EMERGENCY (EMERGENCY)
Facility: HOSPITAL | Age: 57
LOS: 0 days | Discharge: HOME | End: 2017-08-16
Attending: EMERGENCY MEDICINE
Payer: MEDICAID

## 2017-08-16 VITALS
WEIGHT: 130.07 LBS | HEIGHT: 62 IN | RESPIRATION RATE: 17 BRPM | TEMPERATURE: 98 F | HEART RATE: 80 BPM | DIASTOLIC BLOOD PRESSURE: 75 MMHG | SYSTOLIC BLOOD PRESSURE: 124 MMHG | OXYGEN SATURATION: 100 %

## 2017-08-16 DIAGNOSIS — Z98.891 HISTORY OF UTERINE SCAR FROM PREVIOUS SURGERY: Chronic | ICD-10-CM

## 2017-08-16 DIAGNOSIS — S09.91XA UNSPECIFIED INJURY OF EAR, INITIAL ENCOUNTER: Chronic | ICD-10-CM

## 2017-08-16 PROCEDURE — 99283 EMERGENCY DEPT VISIT LOW MDM: CPT

## 2017-08-16 RX ORDER — BACITRACIN 500 [USP'U]/G
1 OINTMENT OPHTHALMIC
Qty: 1 | Refills: 0 | OUTPATIENT
Start: 2017-08-16 | End: 2017-08-23

## 2017-08-16 NOTE — ED PROVIDER NOTE - MEDICAL DECISION MAKING DETAILS
left eyelid with stye noted to dc with warm compress instructions and antibiotic ointment given and follow up opthalmology.

## 2017-08-16 NOTE — ED ADULT TRIAGE NOTE - CHIEF COMPLAINT QUOTE
pt states, " I have a sty to my eye for approx 9 days, it popped yesterday " pt with sty to the left eye

## 2017-08-16 NOTE — ED PROVIDER NOTE - OBJECTIVE STATEMENT
56 year old female with PMH of anxiety Hypothyroid, Depression presenting to ED due to left eyelid swelling x 9 days, no vision changes, eyelid area with redness noted and yesterday began to drain some bloody fluid.

## 2017-08-16 NOTE — ED PROVIDER NOTE - EYES, MLM
Clear bilaterally, pupils equal, round and reactive to light. Left eyelid upper margin with redness and mild tenderness noted above lid margin,

## 2017-08-17 DIAGNOSIS — H57.12 OCULAR PAIN, LEFT EYE: ICD-10-CM

## 2017-08-17 DIAGNOSIS — F41.9 ANXIETY DISORDER, UNSPECIFIED: ICD-10-CM

## 2017-08-17 DIAGNOSIS — M19.90 UNSPECIFIED OSTEOARTHRITIS, UNSPECIFIED SITE: ICD-10-CM

## 2017-08-17 DIAGNOSIS — H00.019 HORDEOLUM EXTERNUM UNSPECIFIED EYE, UNSPECIFIED EYELID: ICD-10-CM

## 2017-08-17 DIAGNOSIS — F32.9 MAJOR DEPRESSIVE DISORDER, SINGLE EPISODE, UNSPECIFIED: ICD-10-CM

## 2017-08-17 DIAGNOSIS — E08.9 DIABETES MELLITUS DUE TO UNDERLYING CONDITION WITHOUT COMPLICATIONS: ICD-10-CM

## 2017-09-13 ENCOUNTER — APPOINTMENT (OUTPATIENT)
Dept: PAIN MANAGEMENT | Facility: CLINIC | Age: 57
End: 2017-09-13
Payer: MEDICAID

## 2017-09-13 VITALS
HEART RATE: 80 BPM | SYSTOLIC BLOOD PRESSURE: 143 MMHG | BODY MASS INDEX: 24.48 KG/M2 | DIASTOLIC BLOOD PRESSURE: 83 MMHG | HEIGHT: 62 IN | WEIGHT: 133 LBS

## 2017-09-13 DIAGNOSIS — M79.1 MYALGIA: ICD-10-CM

## 2017-09-13 PROCEDURE — 99215 OFFICE O/P EST HI 40 MIN: CPT

## 2017-09-29 ENCOUNTER — RX RENEWAL (OUTPATIENT)
Age: 57
End: 2017-09-29

## 2017-10-03 ENCOUNTER — APPOINTMENT (OUTPATIENT)
Dept: GASTROENTEROLOGY | Facility: HOSPITAL | Age: 57
End: 2017-10-03

## 2017-11-13 ENCOUNTER — FORM ENCOUNTER (OUTPATIENT)
Age: 57
End: 2017-11-13

## 2017-11-14 ENCOUNTER — APPOINTMENT (OUTPATIENT)
Dept: RADIOLOGY | Facility: CLINIC | Age: 57
End: 2017-11-14
Payer: MEDICAID

## 2017-11-14 ENCOUNTER — OUTPATIENT (OUTPATIENT)
Dept: OUTPATIENT SERVICES | Facility: HOSPITAL | Age: 57
LOS: 1 days | End: 2017-11-14
Payer: MEDICAID

## 2017-11-14 ENCOUNTER — APPOINTMENT (OUTPATIENT)
Dept: NEUROSURGERY | Facility: CLINIC | Age: 57
End: 2017-11-14
Payer: MEDICAID

## 2017-11-14 VITALS
HEART RATE: 69 BPM | HEIGHT: 62 IN | SYSTOLIC BLOOD PRESSURE: 132 MMHG | WEIGHT: 132 LBS | DIASTOLIC BLOOD PRESSURE: 78 MMHG | BODY MASS INDEX: 24.29 KG/M2

## 2017-11-14 DIAGNOSIS — S09.91XA UNSPECIFIED INJURY OF EAR, INITIAL ENCOUNTER: Chronic | ICD-10-CM

## 2017-11-14 DIAGNOSIS — M54.12 RADICULOPATHY, CERVICAL REGION: ICD-10-CM

## 2017-11-14 DIAGNOSIS — Z98.891 HISTORY OF UTERINE SCAR FROM PREVIOUS SURGERY: Chronic | ICD-10-CM

## 2017-11-14 DIAGNOSIS — M47.812 SPONDYLOSIS WITHOUT MYELOPATHY OR RADICULOPATHY, CERVICAL REGION: ICD-10-CM

## 2017-11-14 PROCEDURE — 72050 X-RAY EXAM NECK SPINE 4/5VWS: CPT | Mod: 26

## 2017-11-14 PROCEDURE — 72050 X-RAY EXAM NECK SPINE 4/5VWS: CPT

## 2017-11-14 PROCEDURE — 99214 OFFICE O/P EST MOD 30 MIN: CPT

## 2017-11-15 ENCOUNTER — MED ADMIN CHARGE (OUTPATIENT)
Age: 57
End: 2017-11-15

## 2017-11-15 ENCOUNTER — OUTPATIENT (OUTPATIENT)
Dept: OUTPATIENT SERVICES | Facility: HOSPITAL | Age: 57
LOS: 1 days | End: 2017-11-15
Payer: MEDICAID

## 2017-11-15 ENCOUNTER — APPOINTMENT (OUTPATIENT)
Dept: INTERNAL MEDICINE | Facility: CLINIC | Age: 57
End: 2017-11-15
Payer: MEDICAID

## 2017-11-15 VITALS
WEIGHT: 130 LBS | HEIGHT: 62 IN | DIASTOLIC BLOOD PRESSURE: 70 MMHG | OXYGEN SATURATION: 98 % | BODY MASS INDEX: 23.92 KG/M2 | HEART RATE: 84 BPM | SYSTOLIC BLOOD PRESSURE: 123 MMHG

## 2017-11-15 DIAGNOSIS — S09.91XA UNSPECIFIED INJURY OF EAR, INITIAL ENCOUNTER: Chronic | ICD-10-CM

## 2017-11-15 DIAGNOSIS — Z98.891 HISTORY OF UTERINE SCAR FROM PREVIOUS SURGERY: Chronic | ICD-10-CM

## 2017-11-15 DIAGNOSIS — I10 ESSENTIAL (PRIMARY) HYPERTENSION: ICD-10-CM

## 2017-11-15 DIAGNOSIS — Z12.11 ENCOUNTER FOR SCREENING FOR MALIGNANT NEOPLASM OF COLON: ICD-10-CM

## 2017-11-15 DIAGNOSIS — M50.20 OTHER CERVICAL DISC DISPLACEMENT, UNSPECIFIED CERVICAL REGION: ICD-10-CM

## 2017-11-15 PROCEDURE — G0008: CPT

## 2017-11-15 PROCEDURE — 90688 IIV4 VACCINE SPLT 0.5 ML IM: CPT

## 2017-11-15 PROCEDURE — 99213 OFFICE O/P EST LOW 20 MIN: CPT | Mod: GE

## 2017-11-15 PROCEDURE — G0463: CPT

## 2017-11-15 RX ORDER — NABUMETONE 750 MG/1
750 TABLET, FILM COATED ORAL
Qty: 60 | Refills: 3 | Status: DISCONTINUED | COMMUNITY
Start: 2017-09-13 | End: 2017-11-15

## 2017-11-20 PROBLEM — M50.20 HERNIATED DISC, CERVICAL: Status: ACTIVE | Noted: 2017-03-10

## 2017-11-20 PROBLEM — Z12.11 SCREENING FOR COLON CANCER: Status: ACTIVE | Noted: 2017-11-15

## 2017-11-20 NOTE — PHYSICAL EXAM
[General Appearance - Alert] : alert [General Appearance - In No Acute Distress] : in no acute distress [General Appearance - Well Nourished] : well nourished [General Appearance - Well Developed] : well developed [PERRL With Normal Accommodation] : pupils were equal in size, round, and reactive to light [Extraocular Movements] : extraocular movements were intact [Neck Cervical Mass (___cm)] : no neck mass was observed [Jugular Venous Distention Increased] : there was no jugular-venous distention [Respiration, Rhythm And Depth] : normal respiratory rhythm and effort [Exaggerated Use Of Accessory Muscles For Inspiration] : no accessory muscle use [Auscultation Breath Sounds / Voice Sounds] : lungs were clear to auscultation bilaterally [Heart Rate And Rhythm] : heart rate was normal and rhythm regular [Heart Sounds] : normal S1 and S2 [Heart Sounds Gallop] : no gallops [Bowel Sounds] : normal bowel sounds [Abdomen Soft] : soft [Abdomen Tenderness] : non-tender [] : no hepato-splenomegaly [No CVA Tenderness] : no ~M costovertebral angle tenderness [Abnormal Walk] : normal gait [Nail Clubbing] : no clubbing  or cyanosis of the fingernails [Musculoskeletal - Swelling] : no joint swelling seen [Motor Tone] : muscle strength and tone were normal [FreeTextEntry1] : No focal tenderness at surgical site.

## 2017-11-20 NOTE — HISTORY OF PRESENT ILLNESS
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 \par \par # CRC screening\par Patient would like a referral back to go see GI. Had an appointment in Oct for forgot the appointment. States history abn findings roughly 5 years ago and was told at that time to have a repeat colonoscopy. No records here. \par \par # Cervical pain\par S/P surgery, still has some muscle tightness and pain. Has not been taking NSAIDs as proscribed by neurosurgery. Still able to do adl's has pain follow up in dec.\par \par # HCM \par Patient is requesting flu shot today.

## 2017-11-20 NOTE — ASSESSMENT
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 \par \par # CRC screening\par Will attempt to get records from previous CRC screening.\par Referral to Gi given\par \par # Cervical pain\par S/P recent follow with neurosurgery, has follow up with pain in dec. \par Will give lower dose of NSAIDS\par Spoke to patient about potential danger of diazepam, will consider tapering off after speaking with her surgeons. \par \par # HCM \par Flu shot given today.

## 2017-11-20 NOTE — REVIEW OF SYSTEMS
[see HPI] : see HPI [Negative] : Integumentary [Fever] : no fever [Chills] : no chills [Feeling Poorly] : not feeling poorly [Feeling Tired] : not feeling tired [Chest Pain] : no chest pain [Palpitations] : no palpitations [Lower Ext Edema] : no lower extremity edema [Shortness Of Breath] : no shortness of breath [Wheezing] : no wheezing [Cough] : no cough [SOB on Exertion] : no shortness of breath during exertion [Abdominal Pain] : no abdominal pain [Vomiting] : no vomiting [Constipation] : no constipation [Diarrhea] : no diarrhea [Skin Lesions] : no skin lesions [Skin Wound] : no skin wound

## 2017-11-24 DIAGNOSIS — Z12.4 ENCOUNTER FOR SCREENING FOR MALIGNANT NEOPLASM OF CERVIX: ICD-10-CM

## 2017-11-24 DIAGNOSIS — M50.20 OTHER CERVICAL DISC DISPLACEMENT, UNSPECIFIED CERVICAL REGION: ICD-10-CM

## 2017-11-24 DIAGNOSIS — E03.4 ATROPHY OF THYROID (ACQUIRED): ICD-10-CM

## 2017-11-24 DIAGNOSIS — Z12.11 ENCOUNTER FOR SCREENING FOR MALIGNANT NEOPLASM OF COLON: ICD-10-CM

## 2017-11-24 DIAGNOSIS — Z23 ENCOUNTER FOR IMMUNIZATION: ICD-10-CM

## 2017-11-28 ENCOUNTER — OUTPATIENT (OUTPATIENT)
Dept: OUTPATIENT SERVICES | Facility: HOSPITAL | Age: 57
LOS: 1 days | End: 2017-11-28
Payer: MEDICAID

## 2017-11-28 ENCOUNTER — APPOINTMENT (OUTPATIENT)
Dept: OBGYN | Facility: CLINIC | Age: 57
End: 2017-11-28
Payer: MEDICAID

## 2017-11-28 ENCOUNTER — LABORATORY RESULT (OUTPATIENT)
Age: 57
End: 2017-11-28

## 2017-11-28 VITALS
HEIGHT: 62 IN | BODY MASS INDEX: 24.54 KG/M2 | SYSTOLIC BLOOD PRESSURE: 142 MMHG | WEIGHT: 133.38 LBS | DIASTOLIC BLOOD PRESSURE: 80 MMHG

## 2017-11-28 DIAGNOSIS — N76.0 ACUTE VAGINITIS: ICD-10-CM

## 2017-11-28 DIAGNOSIS — S09.91XA UNSPECIFIED INJURY OF EAR, INITIAL ENCOUNTER: Chronic | ICD-10-CM

## 2017-11-28 DIAGNOSIS — Z01.419 ENCOUNTER FOR GYNECOLOGICAL EXAMINATION (GENERAL) (ROUTINE) W/OUT ABNORMAL FINDINGS: ICD-10-CM

## 2017-11-28 DIAGNOSIS — Z98.891 HISTORY OF UTERINE SCAR FROM PREVIOUS SURGERY: Chronic | ICD-10-CM

## 2017-11-28 PROCEDURE — G0463: CPT

## 2017-11-28 PROCEDURE — 99396 PREV VISIT EST AGE 40-64: CPT | Mod: GC

## 2017-11-28 PROCEDURE — 87624 HPV HI-RISK TYP POOLED RSLT: CPT

## 2017-11-29 LAB — HPV HIGH+LOW RISK DNA PNL CVX: SIGNIFICANT CHANGE UP

## 2017-12-01 LAB — CYTOLOGY SPEC DOC CYTO: SIGNIFICANT CHANGE UP

## 2017-12-01 PROCEDURE — G9001: CPT

## 2017-12-07 DIAGNOSIS — Z01.419 ENCOUNTER FOR GYNECOLOGICAL EXAMINATION (GENERAL) (ROUTINE) WITHOUT ABNORMAL FINDINGS: ICD-10-CM

## 2017-12-15 PROCEDURE — 97162 PT EVAL MOD COMPLEX 30 MIN: CPT

## 2017-12-15 PROCEDURE — 72040 X-RAY EXAM NECK SPINE 2-3 VW: CPT

## 2017-12-15 PROCEDURE — 86901 BLOOD TYPING SEROLOGIC RH(D): CPT

## 2017-12-15 PROCEDURE — 85027 COMPLETE CBC AUTOMATED: CPT

## 2017-12-15 PROCEDURE — 80048 BASIC METABOLIC PNL TOTAL CA: CPT

## 2017-12-15 PROCEDURE — C1713: CPT

## 2017-12-15 PROCEDURE — 76000 FLUOROSCOPY <1 HR PHYS/QHP: CPT

## 2017-12-15 PROCEDURE — C1889: CPT

## 2017-12-15 PROCEDURE — C1769: CPT

## 2017-12-15 PROCEDURE — 86900 BLOOD TYPING SEROLOGIC ABO: CPT

## 2017-12-27 ENCOUNTER — APPOINTMENT (OUTPATIENT)
Dept: PAIN MANAGEMENT | Facility: CLINIC | Age: 57
End: 2017-12-27
Payer: MEDICAID

## 2017-12-27 VITALS
WEIGHT: 133 LBS | SYSTOLIC BLOOD PRESSURE: 129 MMHG | BODY MASS INDEX: 24.48 KG/M2 | DIASTOLIC BLOOD PRESSURE: 84 MMHG | HEIGHT: 62 IN | HEART RATE: 74 BPM

## 2017-12-27 PROCEDURE — 99214 OFFICE O/P EST MOD 30 MIN: CPT

## 2017-12-27 RX ORDER — BACITRACIN 500 [USP'U]/G
500 OINTMENT OPHTHALMIC
Qty: 4 | Refills: 0 | Status: COMPLETED | COMMUNITY
Start: 2017-08-16

## 2018-01-29 ENCOUNTER — APPOINTMENT (OUTPATIENT)
Dept: OPHTHALMOLOGY | Facility: CLINIC | Age: 58
End: 2018-01-29
Payer: MEDICAID

## 2018-01-29 PROCEDURE — 92012 INTRM OPH EXAM EST PATIENT: CPT

## 2018-01-30 ENCOUNTER — APPOINTMENT (OUTPATIENT)
Dept: PAIN MANAGEMENT | Facility: CLINIC | Age: 58
End: 2018-01-30
Payer: MEDICAID

## 2018-01-30 ENCOUNTER — APPOINTMENT (OUTPATIENT)
Dept: GASTROENTEROLOGY | Facility: HOSPITAL | Age: 58
End: 2018-01-30

## 2018-01-30 VITALS
HEIGHT: 62 IN | BODY MASS INDEX: 24.48 KG/M2 | HEART RATE: 83 BPM | DIASTOLIC BLOOD PRESSURE: 80 MMHG | WEIGHT: 133 LBS | SYSTOLIC BLOOD PRESSURE: 121 MMHG

## 2018-01-30 VITALS — TEMPERATURE: 97.5 F

## 2018-01-30 PROCEDURE — 99213 OFFICE O/P EST LOW 20 MIN: CPT

## 2018-05-22 ENCOUNTER — APPOINTMENT (OUTPATIENT)
Dept: GASTROENTEROLOGY | Facility: HOSPITAL | Age: 58
End: 2018-05-22

## 2018-05-24 ENCOUNTER — APPOINTMENT (OUTPATIENT)
Dept: INTERNAL MEDICINE | Facility: CLINIC | Age: 58
End: 2018-05-24
Payer: MEDICAID

## 2018-05-24 ENCOUNTER — OUTPATIENT (OUTPATIENT)
Dept: OUTPATIENT SERVICES | Facility: HOSPITAL | Age: 58
LOS: 1 days | End: 2018-05-24
Payer: MEDICAID

## 2018-05-24 VITALS
SYSTOLIC BLOOD PRESSURE: 125 MMHG | BODY MASS INDEX: 23.05 KG/M2 | WEIGHT: 126 LBS | OXYGEN SATURATION: 98 % | HEART RATE: 78 BPM | DIASTOLIC BLOOD PRESSURE: 79 MMHG

## 2018-05-24 DIAGNOSIS — G47.00 INSOMNIA, UNSPECIFIED: ICD-10-CM

## 2018-05-24 DIAGNOSIS — I10 ESSENTIAL (PRIMARY) HYPERTENSION: ICD-10-CM

## 2018-05-24 DIAGNOSIS — S09.91XA UNSPECIFIED INJURY OF EAR, INITIAL ENCOUNTER: Chronic | ICD-10-CM

## 2018-05-24 DIAGNOSIS — Z98.891 HISTORY OF UTERINE SCAR FROM PREVIOUS SURGERY: Chronic | ICD-10-CM

## 2018-05-24 DIAGNOSIS — F32.9 MAJOR DEPRESSIVE DISORDER, SINGLE EPISODE, UNSPECIFIED: ICD-10-CM

## 2018-05-24 PROCEDURE — 99213 OFFICE O/P EST LOW 20 MIN: CPT | Mod: GE

## 2018-05-24 PROCEDURE — G0463: CPT

## 2018-05-24 RX ORDER — NAPROXEN 375 MG/1
375 TABLET ORAL
Qty: 60 | Refills: 0 | Status: DISCONTINUED | COMMUNITY
Start: 2017-11-15 | End: 2018-05-24

## 2018-05-24 RX ORDER — BACLOFEN 10 MG/1
10 TABLET ORAL EVERY 8 HOURS
Qty: 90 | Refills: 3 | Status: DISCONTINUED | COMMUNITY
Start: 2017-12-27 | End: 2018-05-24

## 2018-05-24 RX ORDER — DOCUSATE SODIUM 100 MG/1
100 CAPSULE, LIQUID FILLED ORAL 3 TIMES DAILY
Qty: 90 | Refills: 0 | Status: DISCONTINUED | COMMUNITY
Start: 2017-02-01 | End: 2018-05-24

## 2018-05-30 ENCOUNTER — OTHER (OUTPATIENT)
Age: 58
End: 2018-05-30

## 2018-06-01 ENCOUNTER — RX RENEWAL (OUTPATIENT)
Age: 58
End: 2018-06-01

## 2018-06-05 NOTE — BRIEF OPERATIVE NOTE - PROCEDURE
"Urology Postop Note  Clarence Boyle is a 36 year old male who is 4 weeks s/p dorsal onlay buccal urethroplasty for bulbar urethral stricture.    His postoperative course was complicated by a scrotal hematoma which has caused some pain. He also has a minor wound breakdown at the anterior most aspect. The finding is now subtle. The hematoma has been managed conservatively as there is fever, cellulitis or other signs of infection.    The hematoma is improving slowly.     He was last seen by Janae Juarez PA-C on 5/21/2018.     /80  Pulse 100  Ht 1.778 m (5' 10\")  Wt 119.3 kg (263 lb)  BMI 37.74 kg/m2    Exam:  Incision nearly closed with small aspect of anterior aspect of wound with very small opening.  No tenderness or evidence of cellulitis  Moderate scrotal hematoma localized over left hemiscrotum. Not tender or tense. Testicles palpable bilaterally.    A/P   Doing well after buccal urethroplasty.  -He experienced a scrotal hematoma which was managed conservatively.  -He also has some drainage from his perineal incision, but now closing.  -This should heal without further intervention though I discussed with patient that scrotal hematomas often take weeks to months to completely resolve. No sign of infection, and we do not recommend any invasive treatment.  -Will plan on followup 3 months after surgery with Dr. Awad for uroflow, PVR, and cystoscopy.    Jaron Hopper MD    " Anterior cervical discectomy between C4 and C5  01/28/2017    Active  [place]

## 2018-06-10 ENCOUNTER — FORM ENCOUNTER (OUTPATIENT)
Age: 58
End: 2018-06-10

## 2018-06-11 ENCOUNTER — APPOINTMENT (OUTPATIENT)
Dept: MAMMOGRAPHY | Facility: IMAGING CENTER | Age: 58
End: 2018-06-11
Payer: MEDICAID

## 2018-06-11 ENCOUNTER — APPOINTMENT (OUTPATIENT)
Dept: ULTRASOUND IMAGING | Facility: IMAGING CENTER | Age: 58
End: 2018-06-11
Payer: MEDICAID

## 2018-06-11 ENCOUNTER — OUTPATIENT (OUTPATIENT)
Dept: OUTPATIENT SERVICES | Facility: HOSPITAL | Age: 58
LOS: 1 days | End: 2018-06-11
Payer: MEDICAID

## 2018-06-11 DIAGNOSIS — Z00.00 ENCOUNTER FOR GENERAL ADULT MEDICAL EXAMINATION WITHOUT ABNORMAL FINDINGS: ICD-10-CM

## 2018-06-11 DIAGNOSIS — S09.91XA UNSPECIFIED INJURY OF EAR, INITIAL ENCOUNTER: Chronic | ICD-10-CM

## 2018-06-11 DIAGNOSIS — Z98.891 HISTORY OF UTERINE SCAR FROM PREVIOUS SURGERY: Chronic | ICD-10-CM

## 2018-06-11 PROCEDURE — 77063 BREAST TOMOSYNTHESIS BI: CPT | Mod: 26

## 2018-06-11 PROCEDURE — 77067 SCR MAMMO BI INCL CAD: CPT | Mod: 26

## 2018-06-11 PROCEDURE — 77067 SCR MAMMO BI INCL CAD: CPT

## 2018-06-11 PROCEDURE — 77063 BREAST TOMOSYNTHESIS BI: CPT

## 2018-06-21 ENCOUNTER — LABORATORY RESULT (OUTPATIENT)
Age: 58
End: 2018-06-21

## 2018-06-21 ENCOUNTER — OUTPATIENT (OUTPATIENT)
Dept: OUTPATIENT SERVICES | Facility: HOSPITAL | Age: 58
LOS: 1 days | End: 2018-06-21
Payer: MEDICAID

## 2018-06-21 ENCOUNTER — APPOINTMENT (OUTPATIENT)
Dept: INTERNAL MEDICINE | Facility: CLINIC | Age: 58
End: 2018-06-21
Payer: MEDICAID

## 2018-06-21 VITALS
HEIGHT: 62 IN | BODY MASS INDEX: 23.19 KG/M2 | DIASTOLIC BLOOD PRESSURE: 80 MMHG | WEIGHT: 126 LBS | SYSTOLIC BLOOD PRESSURE: 120 MMHG

## 2018-06-21 VITALS — HEART RATE: 59 BPM

## 2018-06-21 DIAGNOSIS — F41.9 ANXIETY DISORDER, UNSPECIFIED: ICD-10-CM

## 2018-06-21 DIAGNOSIS — Z98.891 HISTORY OF UTERINE SCAR FROM PREVIOUS SURGERY: Chronic | ICD-10-CM

## 2018-06-21 DIAGNOSIS — F32.9 MAJOR DEPRESSIVE DISORDER, SINGLE EPISODE, UNSPECIFIED: ICD-10-CM

## 2018-06-21 DIAGNOSIS — M25.522 PAIN IN LEFT ELBOW: ICD-10-CM

## 2018-06-21 DIAGNOSIS — S09.91XA UNSPECIFIED INJURY OF EAR, INITIAL ENCOUNTER: Chronic | ICD-10-CM

## 2018-06-21 DIAGNOSIS — G47.00 INSOMNIA, UNSPECIFIED: ICD-10-CM

## 2018-06-21 DIAGNOSIS — M54.2 CERVICALGIA: ICD-10-CM

## 2018-06-21 DIAGNOSIS — I10 ESSENTIAL (PRIMARY) HYPERTENSION: ICD-10-CM

## 2018-06-21 DIAGNOSIS — R73.03 PREDIABETES: ICD-10-CM

## 2018-06-21 DIAGNOSIS — E03.9 HYPOTHYROIDISM, UNSPECIFIED: ICD-10-CM

## 2018-06-21 LAB
HCT VFR BLD CALC: 35.3 % — SIGNIFICANT CHANGE UP (ref 34.5–45)
HGB BLD-MCNC: 11.2 G/DL — LOW (ref 11.5–15.5)
MCHC RBC-ENTMCNC: 28.6 PG — SIGNIFICANT CHANGE UP (ref 27–34)
MCHC RBC-ENTMCNC: 31.7 GM/DL — LOW (ref 32–36)
MCV RBC AUTO: 90.1 FL — SIGNIFICANT CHANGE UP (ref 80–100)
PLATELET # BLD AUTO: 290 K/UL — SIGNIFICANT CHANGE UP (ref 150–400)
RBC # BLD: 3.92 M/UL — SIGNIFICANT CHANGE UP (ref 3.8–5.2)
RBC # FLD: 13.8 % — SIGNIFICANT CHANGE UP (ref 10.3–14.5)
WBC # BLD: 4.95 K/UL — SIGNIFICANT CHANGE UP (ref 3.8–10.5)
WBC # FLD AUTO: 4.95 K/UL — SIGNIFICANT CHANGE UP (ref 3.8–10.5)

## 2018-06-21 PROCEDURE — G0463: CPT

## 2018-06-21 PROCEDURE — 80048 BASIC METABOLIC PNL TOTAL CA: CPT

## 2018-06-21 PROCEDURE — 99213 OFFICE O/P EST LOW 20 MIN: CPT | Mod: GE

## 2018-06-21 PROCEDURE — 85027 COMPLETE CBC AUTOMATED: CPT

## 2018-06-21 PROCEDURE — 84443 ASSAY THYROID STIM HORMONE: CPT

## 2018-06-21 PROCEDURE — 83036 HEMOGLOBIN GLYCOSYLATED A1C: CPT

## 2018-06-22 LAB
ANION GAP SERPL CALC-SCNC: 14 MMOL/L — SIGNIFICANT CHANGE UP (ref 5–17)
BUN SERPL-MCNC: 11 MG/DL — SIGNIFICANT CHANGE UP (ref 7–23)
CALCIUM SERPL-MCNC: 10.2 MG/DL — SIGNIFICANT CHANGE UP (ref 8.4–10.5)
CHLORIDE SERPL-SCNC: 106 MMOL/L — SIGNIFICANT CHANGE UP (ref 96–108)
CO2 SERPL-SCNC: 24 MMOL/L — SIGNIFICANT CHANGE UP (ref 22–31)
CREAT SERPL-MCNC: 0.7 MG/DL — SIGNIFICANT CHANGE UP (ref 0.5–1.3)
GLUCOSE SERPL-MCNC: 94 MG/DL — SIGNIFICANT CHANGE UP (ref 70–99)
HBA1C BLD-MCNC: 5.6 % — SIGNIFICANT CHANGE UP (ref 4–5.6)
POTASSIUM SERPL-MCNC: 4.7 MMOL/L — SIGNIFICANT CHANGE UP (ref 3.5–5.3)
POTASSIUM SERPL-SCNC: 4.7 MMOL/L — SIGNIFICANT CHANGE UP (ref 3.5–5.3)
SODIUM SERPL-SCNC: 144 MMOL/L — SIGNIFICANT CHANGE UP (ref 135–145)
T4 FREE+ TSH PNL SERPL: 1.75 UIU/ML — SIGNIFICANT CHANGE UP (ref 0.27–4.2)

## 2018-06-22 RX ORDER — AMITRIPTYLINE HYDROCHLORIDE 25 MG/1
25 TABLET, FILM COATED ORAL
Qty: 90 | Refills: 3 | Status: DISCONTINUED | COMMUNITY
Start: 2017-09-13 | End: 2018-06-22

## 2018-06-23 NOTE — HISTORY OF PRESENT ILLNESS
[FreeTextEntry1] : CPE [de-identified] : 57 F PMHx Hypothyroidism, Depression, Cervical OA s/p C4-7 ACDF 1/28/17 c/b post op Oc's Syndrome (now resolved), Pre-DM2 presents for CPE\par \par # Dizziness: chronic issue for pt, has been going on for years, no spinning sensation, described as lightheadedness, no LoC, no falls, no ringing in her ears. \par \par # Coughing attack: occurred last week while at an outdoor baby shower. Pt said cough started suddenly, lasted 10-15 min, had some sips carrillo, went away on its own, pt thought about going to hospital but didn't think it was necessary, was not eating or drinking prior to cough, never had anything like that before. \par \par # Insomnia: has been an issue for many years, receives ambien from psychiatrist, has been getting it for years. Psychiatrist is Stefanie Rizvi MD\par \par # Exercises 20-30 min every AM, takes a daily walk. \par

## 2018-06-23 NOTE — PLAN
[FreeTextEntry1] : 57 F PMHx Hypothyroidism, Depression, Cervical OA s/p C4-7 ACDF 1/28/17 c/b post op Oc's Syndrome (now resolved), Pre-DM2 presents for CPE\par \par # Depression: PHQ 9 at 14\par ----> wrote note for pt to give to psychiatrist with PHQ 9 score at 14, rec'd consideration of SSRI, weaning off ambien and valium. could also consider trazodone. \par ----> pt having memory issues, could be having chronic ambien related effect or could be from depression, would consider MMSE at next visit. \par \par # Dizziness: chronic issue for pt, has been going on for years, no spinning sensation, described as lightheadedness, no LoC, no falls, no ringing in her ears. \par ----> orthostatics in office negative, L tympanic mb defect, pt stated that after ear surgery 30 years ago she was told she would have diizziness chronically, advised pt on using caution at home, would consider meclizine or ENT referral if issue worsens or otherwise becomes more bothersome to her\par \par # Coughing attack: occurred last week while at an outdoor baby shower. Pt said cough started suddenly, lasted 10-15 min, had some sips carrillo, went away on its own, pt thought about going to hospital but didn't think it was necessary, was not eating or drinking prior to cough, never had anything like that before. \par ----> appears pollen related, no recurrence, pt instructed to call if happens again. \par \par # Insomnia: has been an issue for many years, receives ambien from psychiatrist,. \par -----> counseled pt on better sleep hygiene, pt will stop watching TV 90 min before min, will give melatonin prescription, advised pt to wean down ambien to 1/2 current dose, to review w/ psych before DC'ing altogether given potential for w/d sx  \par \par DW Dr. Beltran\par RTC 5 weeks.

## 2018-06-23 NOTE — HEALTH RISK ASSESSMENT
[3] : 1) Little interest or pleasure doing things for nearly every day (3) [2] : 2) Feeling down, depressed, or hopeless for more than half of the days (2) [de-identified] : Psychiatrist.  [[id number]] : 5 [[id number]] : 14

## 2018-06-23 NOTE — REVIEW OF SYSTEMS
[Insomnia] : insomnia [Fever] : no fever [Chills] : no chills [Pain] : no pain [Shortness Of Breath] : no shortness of breath [Wheezing] : no wheezing [Abdominal Pain] : no abdominal pain [Dysuria] : no dysuria [Headache] : no headache [Suicidal] : not suicidal [FreeTextEntry6] : see HPI

## 2018-06-23 NOTE — PHYSICAL EXAM
[No Acute Distress] : no acute distress [Normal Sclera/Conjunctiva] : normal sclera/conjunctiva [No Respiratory Distress] : no respiratory distress  [Normal Rate] : normal rate  [Regular Rhythm] : with a regular rhythm [Soft] : abdomen soft [Normal Gait] : normal gait [Speech Grossly Normal] : speech grossly normal [Normal Mood] : the mood was normal [Normal Insight/Judgement] : insight and judgment were intact [de-identified] : L tympanic membrane defect

## 2018-07-03 ENCOUNTER — APPOINTMENT (OUTPATIENT)
Dept: NEUROSURGERY | Facility: CLINIC | Age: 58
End: 2018-07-03
Payer: MEDICAID

## 2018-07-06 NOTE — REVIEW OF SYSTEMS
[Negative] : Heme/Lymph [Joint Pain] : joint pain [Muscle Pain] : muscle pain [Fever] : no fever [Chills] : no chills [Night Sweats] : no night sweats [Chest Pain] : no chest pain [Palpitations] : no palpitations [Lower Ext Edema] : no lower extremity edema [Shortness Of Breath] : no shortness of breath [Dyspnea on Exertion] : no dyspnea on exertion [Abdominal Pain] : no abdominal pain [Nausea] : no nausea [Vomiting] : no vomiting [Dysuria] : no dysuria [Hematuria] : no hematuria [Frequency] : no frequency [Headache] : no headache [Dizziness] : no dizziness [Memory Loss] : no memory loss [Unsteady Walking] : no ataxia [FreeTextEntry9] : L elbow tenderness

## 2018-07-06 NOTE — PHYSICAL EXAM
[No Acute Distress] : no acute distress [Well Nourished] : well nourished [Well Developed] : well developed [Well-Appearing] : well-appearing [Normal Sclera/Conjunctiva] : normal sclera/conjunctiva [PERRL] : pupils equal round and reactive to light [EOMI] : extraocular movements intact [Normal Outer Ear/Nose] : the outer ears and nose were normal in appearance [Normal Oropharynx] : the oropharynx was normal [No JVD] : no jugular venous distention [Supple] : supple [No Lymphadenopathy] : no lymphadenopathy [No Respiratory Distress] : no respiratory distress  [Clear to Auscultation] : lungs were clear to auscultation bilaterally [No Accessory Muscle Use] : no accessory muscle use [Normal Rate] : normal rate  [Regular Rhythm] : with a regular rhythm [Normal S1, S2] : normal S1 and S2 [No Murmur] : no murmur heard [No Carotid Bruits] : no carotid bruits [No Abdominal Bruit] : a ~M bruit was not heard ~T in the abdomen [No Varicosities] : no varicosities [Pedal Pulses Present] : the pedal pulses are present [No Edema] : there was no peripheral edema [No Extremity Clubbing/Cyanosis] : no extremity clubbing/cyanosis [No Palpable Aorta] : no palpable aorta [Soft] : abdomen soft [Non Tender] : non-tender [Non-distended] : non-distended [No Masses] : no abdominal mass palpated [No HSM] : no HSM [Normal Bowel Sounds] : normal bowel sounds [Normal Supraclavicular Nodes] : no supraclavicular lymphadenopathy [Normal Posterior Cervical Nodes] : no posterior cervical lymphadenopathy [Normal Anterior Cervical Nodes] : no anterior cervical lymphadenopathy [No CVA Tenderness] : no CVA  tenderness [No Spinal Tenderness] : no spinal tenderness [No Joint Swelling] : no joint swelling [Grossly Normal Strength/Tone] : grossly normal strength/tone [No Rash] : no rash [Normal Gait] : normal gait [Coordination Grossly Intact] : coordination grossly intact [No Focal Deficits] : no focal deficits [Deep Tendon Reflexes (DTR)] : deep tendon reflexes were 2+ and symmetric [Normal Affect] : the affect was normal [Normal Insight/Judgement] : insight and judgment were intact [de-identified] : anxious- appearing [de-identified] : No tenderness at L elbow, no erythema or swelling at L elbow

## 2018-07-06 NOTE — HISTORY OF PRESENT ILLNESS
[FreeTextEntry1] : L arm pain [de-identified] : 57 F PMHx Hypothyroidism, Depression, Cervical OA s/p C4-7 ACDF 1/28/17 c/b post op Oc's Syndrome (now resolved), Pre-DM2 presents for f/u\par \par left arm pain - sharp pain occurring 2x/wk, for past month, rated 10/10, Non radiating. Also with intermittent numbness in anterior forearm region.  responded to ASA and tylenol. reports recently lifting more and increased activity around the house.\par  \par s/p cervical spine fusion - Reports scheduling visit with neurosx for regular f/u this upcoming month. Reports chronic neck and  upper back pain. Also follows with Neurology, was  rx'd in JAN and  amytriptyline for h/a and neuropathic pain but has not taken for over a year. Reports she was unaware she had refills. \par  \par Depression - follows with psych,curently treated for depresion and anxiety, with fluoxetine, 40 BID, and diazepam 5mg BID, and ambien 10mg PRN. follow with Dr Stefanie Rizvi. Istop #[id number]\par \par hypothyroidism - Denies heat/cold intolerance, weight changes.  conts with 88mcg synthroid. \par \par HCM - \par pap/HPV  11/2017 neg\par br ca -  6/2018 birads 1\par colon ca - Reports 2013 with nml colonoscopy and was told to f/u in 5 years - will give Gi referral

## 2018-07-06 NOTE — ASSESSMENT
[FreeTextEntry1] : 57 F PMHx Hypothyroidism, Depression, Cervical OA s/p C4-7 ACDF 1/28/17 c/b post op Oc's Syndrome (now resolved), Pre-DM2 presents for f/u\par \par left elbow pain - likely 2/2 radiculopathy 2/2 cervical spine fusion VS muscle strain or tendonitis due to overuse. Improves with anti-inflammatories.  \par - start gabapentin 100mg TID\par - start acetaminophen 500mg q6 PRN\par - f/u with neurology\par -\par  \par s/p cervical spine fusion. Follows with Neurology\par - Advised f/u with Neuro to discuss arm pain and prior rx amitriptyline\par - d/c amitriptyline for now\par - PT referral\par  \par Depression/Anxiety - cont with psych Dr Stefanie Rizvi.\par - cont fluoxetine 40 BID, diazepam 5mg BID and ambien 10mg PRN. Istop #[id number]\par - advised pt to d/ww psych to d/c ambien and start melatonin \par \par hypothyroidism \par - cont synthroid 88 mcg\par - f/u TSH\par HCM - \par pap/HPV  11/2017 neg\par br ca -  6/2018 birads 1\par colon ca - Reports 2013 with nml colonoscopy and was told to f/u in 5 years - gave Gi referral\par \par d/w Dr Smith\par f/u in 3 months

## 2018-07-24 ENCOUNTER — APPOINTMENT (OUTPATIENT)
Dept: NEUROSURGERY | Facility: CLINIC | Age: 58
End: 2018-07-24
Payer: MEDICAID

## 2018-07-24 VITALS
SYSTOLIC BLOOD PRESSURE: 126 MMHG | HEART RATE: 76 BPM | WEIGHT: 126 LBS | DIASTOLIC BLOOD PRESSURE: 77 MMHG | HEIGHT: 62 IN | BODY MASS INDEX: 23.19 KG/M2

## 2018-07-24 PROCEDURE — 99214 OFFICE O/P EST MOD 30 MIN: CPT

## 2018-07-26 ENCOUNTER — OTHER (OUTPATIENT)
Age: 58
End: 2018-07-26

## 2018-08-02 ENCOUNTER — OTHER (OUTPATIENT)
Age: 58
End: 2018-08-02

## 2018-08-06 ENCOUNTER — OTHER (OUTPATIENT)
Age: 58
End: 2018-08-06

## 2018-08-13 ENCOUNTER — APPOINTMENT (OUTPATIENT)
Dept: ORTHOPEDIC SURGERY | Facility: CLINIC | Age: 58
End: 2018-08-13

## 2018-08-17 ENCOUNTER — APPOINTMENT (OUTPATIENT)
Dept: CT IMAGING | Facility: CLINIC | Age: 58
End: 2018-08-17

## 2018-08-21 ENCOUNTER — FORM ENCOUNTER (OUTPATIENT)
Age: 58
End: 2018-08-21

## 2018-08-22 ENCOUNTER — OUTPATIENT (OUTPATIENT)
Dept: OUTPATIENT SERVICES | Facility: HOSPITAL | Age: 58
LOS: 1 days | End: 2018-08-22
Payer: MEDICAID

## 2018-08-22 ENCOUNTER — APPOINTMENT (OUTPATIENT)
Dept: MRI IMAGING | Facility: CLINIC | Age: 58
End: 2018-08-22
Payer: MEDICAID

## 2018-08-22 ENCOUNTER — APPOINTMENT (OUTPATIENT)
Dept: RADIOLOGY | Facility: CLINIC | Age: 58
End: 2018-08-22
Payer: MEDICAID

## 2018-08-22 DIAGNOSIS — M25.511 PAIN IN RIGHT SHOULDER: ICD-10-CM

## 2018-08-22 DIAGNOSIS — Z98.1 ARTHRODESIS STATUS: ICD-10-CM

## 2018-08-22 DIAGNOSIS — M19.90 UNSPECIFIED OSTEOARTHRITIS, UNSPECIFIED SITE: ICD-10-CM

## 2018-08-22 DIAGNOSIS — M50.20 OTHER CERVICAL DISC DISPLACEMENT, UNSPECIFIED CERVICAL REGION: ICD-10-CM

## 2018-08-22 DIAGNOSIS — S09.91XA UNSPECIFIED INJURY OF EAR, INITIAL ENCOUNTER: Chronic | ICD-10-CM

## 2018-08-22 DIAGNOSIS — Z00.8 ENCOUNTER FOR OTHER GENERAL EXAMINATION: ICD-10-CM

## 2018-08-22 DIAGNOSIS — Z98.891 HISTORY OF UTERINE SCAR FROM PREVIOUS SURGERY: Chronic | ICD-10-CM

## 2018-08-22 PROCEDURE — 72141 MRI NECK SPINE W/O DYE: CPT

## 2018-08-22 PROCEDURE — 72141 MRI NECK SPINE W/O DYE: CPT | Mod: 26

## 2018-08-22 PROCEDURE — 73030 X-RAY EXAM OF SHOULDER: CPT | Mod: 26,RT

## 2018-08-22 PROCEDURE — 73030 X-RAY EXAM OF SHOULDER: CPT

## 2018-08-22 PROCEDURE — 72148 MRI LUMBAR SPINE W/O DYE: CPT | Mod: 26

## 2018-08-22 PROCEDURE — 72148 MRI LUMBAR SPINE W/O DYE: CPT

## 2018-10-03 ENCOUNTER — APPOINTMENT (OUTPATIENT)
Dept: INTERNAL MEDICINE | Facility: CLINIC | Age: 58
End: 2018-10-03

## 2018-10-23 ENCOUNTER — APPOINTMENT (OUTPATIENT)
Dept: NEUROSURGERY | Facility: CLINIC | Age: 58
End: 2018-10-23
Payer: MEDICAID

## 2018-10-31 ENCOUNTER — RX RENEWAL (OUTPATIENT)
Age: 58
End: 2018-10-31

## 2018-11-06 ENCOUNTER — APPOINTMENT (OUTPATIENT)
Dept: NEUROSURGERY | Facility: CLINIC | Age: 58
End: 2018-11-06
Payer: MEDICAID

## 2018-11-06 VITALS
HEIGHT: 62 IN | DIASTOLIC BLOOD PRESSURE: 82 MMHG | SYSTOLIC BLOOD PRESSURE: 134 MMHG | BODY MASS INDEX: 22.82 KG/M2 | HEART RATE: 77 BPM | WEIGHT: 124 LBS

## 2018-11-06 PROCEDURE — 99213 OFFICE O/P EST LOW 20 MIN: CPT

## 2018-11-15 ENCOUNTER — APPOINTMENT (OUTPATIENT)
Dept: INTERNAL MEDICINE | Facility: CLINIC | Age: 58
End: 2018-11-15

## 2018-11-29 ENCOUNTER — OUTPATIENT (OUTPATIENT)
Dept: OUTPATIENT SERVICES | Facility: HOSPITAL | Age: 58
LOS: 1 days | End: 2018-11-29
Payer: MEDICAID

## 2018-11-29 ENCOUNTER — APPOINTMENT (OUTPATIENT)
Dept: INTERNAL MEDICINE | Facility: CLINIC | Age: 58
End: 2018-11-29
Payer: MEDICAID

## 2018-11-29 VITALS
HEART RATE: 74 BPM | HEIGHT: 62 IN | WEIGHT: 125 LBS | SYSTOLIC BLOOD PRESSURE: 118 MMHG | BODY MASS INDEX: 23 KG/M2 | OXYGEN SATURATION: 98 % | DIASTOLIC BLOOD PRESSURE: 70 MMHG

## 2018-11-29 DIAGNOSIS — S09.91XA UNSPECIFIED INJURY OF EAR, INITIAL ENCOUNTER: Chronic | ICD-10-CM

## 2018-11-29 DIAGNOSIS — M25.562 PAIN IN LEFT KNEE: ICD-10-CM

## 2018-11-29 DIAGNOSIS — M25.561 PAIN IN RIGHT KNEE: ICD-10-CM

## 2018-11-29 DIAGNOSIS — Z98.891 HISTORY OF UTERINE SCAR FROM PREVIOUS SURGERY: Chronic | ICD-10-CM

## 2018-11-29 DIAGNOSIS — I10 ESSENTIAL (PRIMARY) HYPERTENSION: ICD-10-CM

## 2018-11-29 PROCEDURE — G0463: CPT

## 2018-11-29 PROCEDURE — 99213 OFFICE O/P EST LOW 20 MIN: CPT | Mod: GE

## 2018-11-30 NOTE — PHYSICAL EXAM
[No Acute Distress] : no acute distress [Well Developed] : well developed [Normal Sclera/Conjunctiva] : normal sclera/conjunctiva [EOMI] : extraocular movements intact [Normal Oropharynx] : the oropharynx was normal [Supple] : supple [Clear to Auscultation] : lungs were clear to auscultation bilaterally [Regular Rhythm] : with a regular rhythm [Normal S1, S2] : normal S1 and S2 [No Murmur] : no murmur heard [No Edema] : there was no peripheral edema [Soft] : abdomen soft [Non Tender] : non-tender [Non-distended] : non-distended [No Joint Swelling] : no joint swelling [No Rash] : no rash [Normal Gait] : normal gait [No Focal Deficits] : no focal deficits [Normal Affect] : the affect was normal [Normal Mood] : the mood was normal [de-identified] : ROM of b/l knees intact, no evidence of erythema or swelling in b/l knees

## 2018-11-30 NOTE — REVIEW OF SYSTEMS
[Joint Pain] : joint pain [Joint Stiffness] : joint stiffness [Joint Swelling] : joint swelling [Fever] : no fever [Vision Problems] : no vision problems [Sore Throat] : no sore throat [Chest Pain] : no chest pain [Shortness Of Breath] : no shortness of breath [Abdominal Pain] : no abdominal pain [Dysuria] : no dysuria [Muscle Pain] : no muscle pain [Back Pain] : no back pain [Itching] : no itching [Headache] : no headache [Depression] : no depression

## 2018-11-30 NOTE — HISTORY OF PRESENT ILLNESS
[FreeTextEntry8] : CC: b/l knee pain \par \par 57F h/o pre-T2DM, hypothyroidism, depression, cervical OA s/p C4-7 ACDF 1/28/17 c/b post op Oc's Syndrome, early menopause (early 40s) presents for acute eval of b/l knee pain. Reports over past month she has had intermittent b/l knee pain, worse with exertion or when bearing full weight on knees. Improved with rest. Not improved with NSAIDs. No fevers, erythema of knee. Had brief episode where right knee swelled up but now resolved.  No recent trauma. No h/o gout or swelling/redness of other joints. No knee instability, clicking or catching sensation. Also requests DEXA given h/o early menopause.

## 2018-11-30 NOTE — ASSESSMENT
[FreeTextEntry1] : 57F h/o pre-T2DM, hypothyroidism, depression, cervical OA s/p C4-7 ACDF 1/28/17 c/b post op Oc's Syndrome, early menopause (early 40s) presents for acute eval of b/l knee pain: \par \par #b/l knee pain- likely 2/2 OA \par -recommend trial of meloxicam, PT, check xray b/l knee, ortho referral given for potential corticosteroid injection  \par  \par RTC in 5 weeks\par d/w Dr. Eugene \par Cooper Briceño PGY3 \par \par

## 2018-12-10 ENCOUNTER — FORM ENCOUNTER (OUTPATIENT)
Age: 58
End: 2018-12-10

## 2018-12-11 ENCOUNTER — APPOINTMENT (OUTPATIENT)
Dept: RADIOLOGY | Facility: IMAGING CENTER | Age: 58
End: 2018-12-11
Payer: MEDICAID

## 2018-12-11 ENCOUNTER — OUTPATIENT (OUTPATIENT)
Dept: OUTPATIENT SERVICES | Facility: HOSPITAL | Age: 58
LOS: 1 days | End: 2018-12-11
Payer: MEDICAID

## 2018-12-11 DIAGNOSIS — S09.91XA UNSPECIFIED INJURY OF EAR, INITIAL ENCOUNTER: Chronic | ICD-10-CM

## 2018-12-11 DIAGNOSIS — Z98.891 HISTORY OF UTERINE SCAR FROM PREVIOUS SURGERY: Chronic | ICD-10-CM

## 2018-12-11 DIAGNOSIS — M25.561 PAIN IN RIGHT KNEE: ICD-10-CM

## 2018-12-11 PROCEDURE — 73564 X-RAY EXAM KNEE 4 OR MORE: CPT | Mod: 26,LT,RT

## 2018-12-11 PROCEDURE — 73564 X-RAY EXAM KNEE 4 OR MORE: CPT

## 2018-12-11 PROCEDURE — 77080 DXA BONE DENSITY AXIAL: CPT | Mod: 26

## 2018-12-11 PROCEDURE — 77080 DXA BONE DENSITY AXIAL: CPT

## 2018-12-12 ENCOUNTER — MEDICATION RENEWAL (OUTPATIENT)
Age: 58
End: 2018-12-12

## 2018-12-27 ENCOUNTER — APPOINTMENT (OUTPATIENT)
Dept: CARDIOLOGY | Facility: CLINIC | Age: 58
End: 2018-12-27

## 2019-01-29 ENCOUNTER — APPOINTMENT (OUTPATIENT)
Dept: OPHTHALMOLOGY | Facility: CLINIC | Age: 59
End: 2019-01-29
Payer: MEDICAID

## 2019-01-29 PROCEDURE — 92012 INTRM OPH EXAM EST PATIENT: CPT

## 2019-01-29 PROCEDURE — 92083 EXTENDED VISUAL FIELD XM: CPT

## 2019-01-29 PROCEDURE — 92015 DETERMINE REFRACTIVE STATE: CPT

## 2019-02-25 ENCOUNTER — APPOINTMENT (OUTPATIENT)
Dept: PAIN MANAGEMENT | Facility: CLINIC | Age: 59
End: 2019-02-25
Payer: MEDICAID

## 2019-02-25 VITALS
SYSTOLIC BLOOD PRESSURE: 135 MMHG | HEIGHT: 62 IN | WEIGHT: 131 LBS | DIASTOLIC BLOOD PRESSURE: 79 MMHG | BODY MASS INDEX: 24.11 KG/M2 | HEART RATE: 69 BPM

## 2019-02-25 PROCEDURE — 99213 OFFICE O/P EST LOW 20 MIN: CPT

## 2019-02-25 RX ORDER — GABAPENTIN 100 MG/1
100 CAPSULE ORAL 3 TIMES DAILY
Qty: 90 | Refills: 3 | Status: DISCONTINUED | COMMUNITY
Start: 2018-06-21 | End: 2019-02-25

## 2019-02-25 NOTE — HISTORY OF PRESENT ILLNESS
[Headache] : headache [Phonophobia] : phonophobia [FreeTextEntry1] : Pt is here  for a followup visit. \par Last visit at our office was 1/ 2018.\par SHe is no longer taking Amitriptyline and is unsure why. She continues to have almost daily headache, although denies headache today. ]\par Denies sensitivity to light but is sensitive to sound. Denies nausea.\par Does have neck pain as well. Has not done prescribed PT. \par \par Denies any new health issues. \par \par  [Nausea] : no nausea

## 2019-02-25 NOTE — PHYSICAL EXAM
[General Appearance - Alert] : alert [General Appearance - In No Acute Distress] : in no acute distress [General Appearance - Well-Appearing] : healthy appearing [Oriented To Time, Place, And Person] : oriented to person, place, and time [Affect] : the affect was normal [Mood] : the mood was normal [2+] : Brachioradialis left 2+ [Sclera] : the sclera and conjunctiva were normal [PERRL With Normal Accommodation] : pupils were equal in size, round, reactive to light, with normal accommodation [Extraocular Movements] : extraocular movements were intact [] : no respiratory distress [Respiration, Rhythm And Depth] : normal respiratory rhythm and effort [Heart Rate And Rhythm] : heart rate was normal and rhythm regular [Edema] : there was no peripheral edema [Paresis Pronator Drift Right-Sided] : no pronator drift on the right [Paresis Pronator Drift Left-Sided] : no pronator drift on the left [Motor Strength Upper Extremities Bilaterally] : strength was normal in both upper extremities [Motor Strength Lower Extremities Bilaterally] : strength was normal in both lower extremities [Coordination - Dysmetria Impaired Finger-to-Nose Bilateral] : not present

## 2019-02-25 NOTE — ASSESSMENT
[FreeTextEntry1] : Will restart Amitriptyline. Discussed possible adverse effects- including palpitations, dry eyes and mouth , constipation. \par RTO 1 month. \par Schedule follow up appt with Dr De La Fuente as well. \par

## 2019-02-25 NOTE — REVIEW OF SYSTEMS
[Fever] : no fever [Chills] : no chills [Chest Pain] : no chest pain [Palpitations] : no palpitations [Shortness Of Breath] : no shortness of breath [Dizziness] : no dizziness

## 2019-03-01 ENCOUNTER — OUTPATIENT (OUTPATIENT)
Dept: OUTPATIENT SERVICES | Facility: HOSPITAL | Age: 59
LOS: 1 days | End: 2019-03-01
Payer: MEDICAID

## 2019-03-01 DIAGNOSIS — Z98.891 HISTORY OF UTERINE SCAR FROM PREVIOUS SURGERY: Chronic | ICD-10-CM

## 2019-03-01 DIAGNOSIS — S09.91XA UNSPECIFIED INJURY OF EAR, INITIAL ENCOUNTER: Chronic | ICD-10-CM

## 2019-03-12 ENCOUNTER — APPOINTMENT (OUTPATIENT)
Dept: INTERNAL MEDICINE | Facility: CLINIC | Age: 59
End: 2019-03-12

## 2019-03-12 VITALS
BODY MASS INDEX: 23.92 KG/M2 | SYSTOLIC BLOOD PRESSURE: 128 MMHG | WEIGHT: 130 LBS | HEART RATE: 75 BPM | DIASTOLIC BLOOD PRESSURE: 80 MMHG | OXYGEN SATURATION: 99 % | HEIGHT: 62 IN

## 2019-03-13 NOTE — PHYSICAL EXAM
[No Acute Distress] : no acute distress [Well Nourished] : well nourished [Normal Sclera/Conjunctiva] : normal sclera/conjunctiva [PERRL] : pupils equal round and reactive to light [Normal Outer Ear/Nose] : the outer ears and nose were normal in appearance [Supple] : supple [No Respiratory Distress] : no respiratory distress  [Clear to Auscultation] : lungs were clear to auscultation bilaterally [Normal Rate] : normal rate  [Regular Rhythm] : with a regular rhythm [Normal S1, S2] : normal S1 and S2 [No Edema] : there was no peripheral edema [Soft] : abdomen soft [Non Tender] : non-tender [Non-distended] : non-distended [No Rash] : no rash [Normal Gait] : normal gait [Normal Affect] : the affect was normal

## 2019-03-15 ENCOUNTER — RESULT REVIEW (OUTPATIENT)
Age: 59
End: 2019-03-15

## 2019-03-15 DIAGNOSIS — Z71.89 OTHER SPECIFIED COUNSELING: ICD-10-CM

## 2019-03-15 LAB
ALBUMIN SERPL ELPH-MCNC: 5.1 G/DL
ALP BLD-CCNC: 85 U/L
ALT SERPL-CCNC: 14 U/L
ANION GAP SERPL CALC-SCNC: 15 MMOL/L
AST SERPL-CCNC: 16 U/L
BASOPHILS # BLD AUTO: 0.07 K/UL
BASOPHILS NFR BLD AUTO: 1.3 %
BILIRUB SERPL-MCNC: <0.2 MG/DL
BUN SERPL-MCNC: 14 MG/DL
CALCIUM SERPL-MCNC: 10.4 MG/DL
CHLORIDE SERPL-SCNC: 105 MMOL/L
CHOLEST SERPL-MCNC: 228 MG/DL
CHOLEST/HDLC SERPL: 3.5 RATIO
CO2 SERPL-SCNC: 23 MMOL/L
CREAT SERPL-MCNC: 0.61 MG/DL
EOSINOPHIL # BLD AUTO: 0.26 K/UL
EOSINOPHIL NFR BLD AUTO: 4.8 %
GLUCOSE SERPL-MCNC: 88 MG/DL
HBA1C MFR BLD HPLC: 5.7 %
HCT VFR BLD CALC: 39.3 %
HDLC SERPL-MCNC: 66 MG/DL
HGB BLD-MCNC: 12 G/DL
IMM GRANULOCYTES NFR BLD AUTO: 0.2 %
LDLC SERPL CALC-MCNC: 116 MG/DL
LYMPHOCYTES # BLD AUTO: 2.05 K/UL
LYMPHOCYTES NFR BLD AUTO: 38.2 %
MAN DIFF?: NORMAL
MCHC RBC-ENTMCNC: 28.6 PG
MCHC RBC-ENTMCNC: 30.5 GM/DL
MCV RBC AUTO: 93.8 FL
MONOCYTES # BLD AUTO: 0.39 K/UL
MONOCYTES NFR BLD AUTO: 7.3 %
NEUTROPHILS # BLD AUTO: 2.59 K/UL
NEUTROPHILS NFR BLD AUTO: 48.2 %
PLATELET # BLD AUTO: 306 K/UL
POTASSIUM SERPL-SCNC: 4.9 MMOL/L
PROT SERPL-MCNC: 7.8 G/DL
RBC # BLD: 4.19 M/UL
RBC # FLD: 13.5 %
SODIUM SERPL-SCNC: 143 MMOL/L
TRIGL SERPL-MCNC: 228 MG/DL
TSH SERPL-ACNC: 1.69 UIU/ML
WBC # FLD AUTO: 5.37 K/UL

## 2019-03-15 NOTE — END OF VISIT
[] : Resident [FreeTextEntry3] : Patient is a 58 yo F with PMH as above with complaints of intermittent dizziness, with no clear identifiable precipitating factors. Would advise to stop Amitriptyline prescribed for headache as it may exacerbate dizziness and patient is not appropriately taking the medication as directed anyways. Will also check CBC, TSH. Advised to stand/change positions slowly for safety.

## 2019-03-15 NOTE — PLAN
[FreeTextEntry1] : Pt is a 57 yo F w/ a PMHx pre-T2DM, hypothyroidism, depression, cervical OA s/p C4-7 ACDF c/b post-op Oc's Syndrome, early menopause (early 40s) presents after an episode of dizziness.\par \par #Dizziness\par - possible side effect of amitriptyline \par - orthostatics negative\par - pt w/ downtrending Hgb on last CBC, will recheck CBC\par - CMP, TSH\par \par #HCM\par - pt given FIT test\par - lipid profile\par - mammography referral\par \par D/w Dr. Pritchard

## 2019-03-15 NOTE — HISTORY OF PRESENT ILLNESS
[de-identified] : Pt is a 59 yo F w/ a PMHx pre-T2DM, hypothyroidism, depression, cervical OA s/p C4-7 ACDF c/b post-op Oc's Syndrome, early menopause (early 40s) presents after an episode of dizziness. Pt states she developed dizziness after waking up on 3/9. She had to sit down to steady herself. Pt states this has occurred about every 2 weeks for a number of years. She denies any sensation of vertigo. She states she is well hydrated but did not eat much that day. The sensation went away on its own by the afternoon. \par Pt sees neurology for chronic headaches. She was recently restarted on amitriptyline but has not been taking it every day.

## 2019-03-15 NOTE — REVIEW OF SYSTEMS
[Headache] : headache [Dizziness] : dizziness [Fever] : no fever [Chills] : no chills [Fatigue] : no fatigue [Vision Problems] : no vision problems [Nasal Discharge] : no nasal discharge [Chest Pain] : no chest pain [Palpitations] : no palpitations [Orthopnea] : no orthopnea [Shortness Of Breath] : no shortness of breath [Wheezing] : no wheezing [Abdominal Pain] : no abdominal pain [Nausea] : no nausea [Vomiting] : no vomiting [Dysuria] : no dysuria [Incontinence] : no incontinence [Joint Pain] : no joint pain [Itching] : no itching [Fainting] : no fainting [Memory Loss] : no memory loss

## 2019-03-26 ENCOUNTER — APPOINTMENT (OUTPATIENT)
Dept: PAIN MANAGEMENT | Facility: CLINIC | Age: 59
End: 2019-03-26

## 2019-03-26 DIAGNOSIS — Z76.89 PERSONS ENCOUNTERING HEALTH SERVICES IN OTHER SPECIFIED CIRCUMSTANCES: ICD-10-CM

## 2019-05-25 ENCOUNTER — EMERGENCY (EMERGENCY)
Facility: HOSPITAL | Age: 59
LOS: 1 days | Discharge: HOME | End: 2019-05-25
Attending: EMERGENCY MEDICINE
Payer: MEDICAID

## 2019-05-25 VITALS
HEART RATE: 88 BPM | RESPIRATION RATE: 16 BRPM | HEIGHT: 62 IN | DIASTOLIC BLOOD PRESSURE: 81 MMHG | TEMPERATURE: 98 F | SYSTOLIC BLOOD PRESSURE: 151 MMHG | WEIGHT: 130.07 LBS | OXYGEN SATURATION: 100 %

## 2019-05-25 VITALS — OXYGEN SATURATION: 100 % | RESPIRATION RATE: 18 BRPM

## 2019-05-25 DIAGNOSIS — Z98.891 HISTORY OF UTERINE SCAR FROM PREVIOUS SURGERY: Chronic | ICD-10-CM

## 2019-05-25 DIAGNOSIS — S09.91XA UNSPECIFIED INJURY OF EAR, INITIAL ENCOUNTER: Chronic | ICD-10-CM

## 2019-05-25 LAB
ALBUMIN SERPL ELPH-MCNC: 4.7 G/DL — SIGNIFICANT CHANGE UP (ref 3.3–5)
ALP SERPL-CCNC: 74 U/L — SIGNIFICANT CHANGE UP (ref 40–120)
ALT FLD-CCNC: 15 U/L — SIGNIFICANT CHANGE UP (ref 10–45)
ANION GAP SERPL CALC-SCNC: 12 MMOL/L — SIGNIFICANT CHANGE UP (ref 5–17)
AST SERPL-CCNC: 13 U/L — SIGNIFICANT CHANGE UP (ref 10–40)
BASOPHILS # BLD AUTO: 0 K/UL — SIGNIFICANT CHANGE UP (ref 0–0.2)
BASOPHILS NFR BLD AUTO: 0.7 % — SIGNIFICANT CHANGE UP (ref 0–2)
BILIRUB SERPL-MCNC: 0.1 MG/DL — LOW (ref 0.2–1.2)
BUN SERPL-MCNC: 15 MG/DL — SIGNIFICANT CHANGE UP (ref 7–23)
CALCIUM SERPL-MCNC: 9.8 MG/DL — SIGNIFICANT CHANGE UP (ref 8.4–10.5)
CHLORIDE SERPL-SCNC: 106 MMOL/L — SIGNIFICANT CHANGE UP (ref 96–108)
CO2 SERPL-SCNC: 25 MMOL/L — SIGNIFICANT CHANGE UP (ref 22–31)
CREAT SERPL-MCNC: 0.57 MG/DL — SIGNIFICANT CHANGE UP (ref 0.5–1.3)
EOSINOPHIL # BLD AUTO: 0.2 K/UL — SIGNIFICANT CHANGE UP (ref 0–0.5)
EOSINOPHIL NFR BLD AUTO: 5.5 % — SIGNIFICANT CHANGE UP (ref 0–6)
GLUCOSE SERPL-MCNC: 100 MG/DL — HIGH (ref 70–99)
HCT VFR BLD CALC: 36.7 % — SIGNIFICANT CHANGE UP (ref 34.5–45)
HGB BLD-MCNC: 12.3 G/DL — SIGNIFICANT CHANGE UP (ref 11.5–15.5)
LYMPHOCYTES # BLD AUTO: 1.5 K/UL — SIGNIFICANT CHANGE UP (ref 1–3.3)
LYMPHOCYTES # BLD AUTO: 35.8 % — SIGNIFICANT CHANGE UP (ref 13–44)
MCHC RBC-ENTMCNC: 30.4 PG — SIGNIFICANT CHANGE UP (ref 27–34)
MCHC RBC-ENTMCNC: 33.5 GM/DL — SIGNIFICANT CHANGE UP (ref 32–36)
MCV RBC AUTO: 90.7 FL — SIGNIFICANT CHANGE UP (ref 80–100)
MONOCYTES # BLD AUTO: 0.3 K/UL — SIGNIFICANT CHANGE UP (ref 0–0.9)
MONOCYTES NFR BLD AUTO: 7.9 % — SIGNIFICANT CHANGE UP (ref 2–14)
NEUTROPHILS # BLD AUTO: 2.1 K/UL — SIGNIFICANT CHANGE UP (ref 1.8–7.4)
NEUTROPHILS NFR BLD AUTO: 50.2 % — SIGNIFICANT CHANGE UP (ref 43–77)
PLATELET # BLD AUTO: 296 K/UL — SIGNIFICANT CHANGE UP (ref 150–400)
POTASSIUM SERPL-MCNC: 3.8 MMOL/L — SIGNIFICANT CHANGE UP (ref 3.5–5.3)
POTASSIUM SERPL-SCNC: 3.8 MMOL/L — SIGNIFICANT CHANGE UP (ref 3.5–5.3)
PROT SERPL-MCNC: 7.5 G/DL — SIGNIFICANT CHANGE UP (ref 6–8.3)
RBC # BLD: 4.04 M/UL — SIGNIFICANT CHANGE UP (ref 3.8–5.2)
RBC # FLD: 12 % — SIGNIFICANT CHANGE UP (ref 10.3–14.5)
SODIUM SERPL-SCNC: 143 MMOL/L — SIGNIFICANT CHANGE UP (ref 135–145)
TSH SERPL-MCNC: 1.67 UIU/ML — SIGNIFICANT CHANGE UP (ref 0.27–4.2)
WBC # BLD: 4.2 K/UL — SIGNIFICANT CHANGE UP (ref 3.8–10.5)
WBC # FLD AUTO: 4.2 K/UL — SIGNIFICANT CHANGE UP (ref 3.8–10.5)

## 2019-05-25 PROCEDURE — 96374 THER/PROPH/DIAG INJ IV PUSH: CPT

## 2019-05-25 PROCEDURE — 99284 EMERGENCY DEPT VISIT MOD MDM: CPT

## 2019-05-25 PROCEDURE — 85027 COMPLETE CBC AUTOMATED: CPT

## 2019-05-25 PROCEDURE — 85652 RBC SED RATE AUTOMATED: CPT

## 2019-05-25 PROCEDURE — 80053 COMPREHEN METABOLIC PANEL: CPT

## 2019-05-25 PROCEDURE — 84443 ASSAY THYROID STIM HORMONE: CPT

## 2019-05-25 PROCEDURE — 96375 TX/PRO/DX INJ NEW DRUG ADDON: CPT

## 2019-05-25 PROCEDURE — 93005 ELECTROCARDIOGRAM TRACING: CPT

## 2019-05-25 PROCEDURE — 99284 EMERGENCY DEPT VISIT MOD MDM: CPT | Mod: 25

## 2019-05-25 RX ORDER — MECLIZINE HCL 12.5 MG
1 TABLET ORAL
Qty: 6 | Refills: 0
Start: 2019-05-25 | End: 2019-05-26

## 2019-05-25 RX ORDER — ONDANSETRON 8 MG/1
4 TABLET, FILM COATED ORAL ONCE
Refills: 0 | Status: COMPLETED | OUTPATIENT
Start: 2019-05-25 | End: 2019-05-25

## 2019-05-25 RX ORDER — FAMOTIDINE 10 MG/ML
20 INJECTION INTRAVENOUS ONCE
Refills: 0 | Status: COMPLETED | OUTPATIENT
Start: 2019-05-25 | End: 2019-05-25

## 2019-05-25 RX ORDER — ACETAMINOPHEN 500 MG
975 TABLET ORAL ONCE
Refills: 0 | Status: COMPLETED | OUTPATIENT
Start: 2019-05-25 | End: 2019-05-25

## 2019-05-25 RX ORDER — SODIUM CHLORIDE 9 MG/ML
1000 INJECTION INTRAMUSCULAR; INTRAVENOUS; SUBCUTANEOUS ONCE
Refills: 0 | Status: COMPLETED | OUTPATIENT
Start: 2019-05-25 | End: 2019-05-25

## 2019-05-25 RX ADMIN — SODIUM CHLORIDE 1000 MILLILITER(S): 9 INJECTION INTRAMUSCULAR; INTRAVENOUS; SUBCUTANEOUS at 09:50

## 2019-05-25 RX ADMIN — Medication 30 MILLILITER(S): at 09:50

## 2019-05-25 RX ADMIN — FAMOTIDINE 20 MILLIGRAM(S): 10 INJECTION INTRAVENOUS at 09:50

## 2019-05-25 RX ADMIN — Medication 975 MILLIGRAM(S): at 09:50

## 2019-05-25 RX ADMIN — ONDANSETRON 4 MILLIGRAM(S): 8 TABLET, FILM COATED ORAL at 09:50

## 2019-05-25 NOTE — ED ADULT NURSE NOTE - CHPI ED NUR SYMPTOMS NEG
no confusion/no change in level of consciousness/no blurred vision/no fever/no loss of consciousness

## 2019-05-25 NOTE — ED PROVIDER NOTE - NSFOLLOWUPINSTRUCTIONS_ED_ALL_ED_FT
It is very important that you call the neurologist to arrange for follow up.  Please return if you have worsening dizziness, unable to walk, develop any numbness any fevers or any neck pain  Please take medications as prescribed.

## 2019-05-25 NOTE — ED PROVIDER NOTE - OBJECTIVE STATEMENT
59yo F with PMH hypothyroidism, depression, presenting with intermittent dizziness x 2 weeks. Pt reports non-room spinning dizziness that occurs when she goes from lying to standing, last a few minutes and resolves on own. Reports associated generalized HA to top of head every day that resolves with tylenol. Currently HA is 4/10 and worse with palpation of scalp. Did not take anything for pain today. Pt reports nausea and feeling of acid reflux 2 days ago that has returned since drinking coffee this morning. Has not eaten today. Not currently dizzy. Took bus here. Denies any fever/chills, head injury, vision changes, CP, palpitations, SOB, vomiting, diarrhea, melena, BRBPR, urinary symptoms, numbness/tingling. Takes ambien at night but denies any new meds or change in meds. 59yo F with PMH hypothyroidism, depression, presenting with intermittent dizziness x 2 weeks. Pt reports non-room spinning dizziness that occurs when she goes from lying to standing, lasts a few minutes and resolves on own. [place] this way last at 4am today when she got up to go to bathroom. Reports associated generalized HA to top of head every day that resolves with tylenol. Currently HA is 4/10 and worse with palpation of scalp. Did not take anything for pain today. Pt reports nausea and feeling of acid reflux 2 days ago that has returned since drinking coffee this morning. Has not eaten today. Not currently dizzy. Took bus here. Reports she tried to make appointment with neuro but only availability was in August. Denies any fever/chills, head injury, vision changes, CP, palpitations, SOB, vomiting, diarrhea, melena, BRBPR, urinary symptoms, numbness/tingling, recent travel. Takes ambien at night but denies any new meds or change in meds. 59yo F with PMH hypothyroidism, depression, presenting with intermittent dizziness x 2 weeks. Pt reports non-room spinning dizziness that occurs when she goes from lying to standing, lasts a few minutes and resolves on own. [place] this way last at 4am today when she got up to go to bathroom. Reports associated generalized HA to top of head every day that resolves with tylenol. Currently HA is 4/10 and worse with palpation of scalp. Did not take anything for pain today. Pt reports nausea and feeling of acid reflux 2 days ago that has returned since drinking coffee this morning. Has not eaten today. Not currently dizzy. Took bus here. Reports she tried to make appointment with neuro but only availability was in August. Denies any fever/chills, head injury, vision changes, ear pain, CP, palpitations, SOB, vomiting, diarrhea, melena, BRBPR, urinary symptoms, numbness/tingling, recent travel. Takes ambien at night but denies any new meds or change in meds.

## 2019-05-25 NOTE — ED ADULT NURSE NOTE - OBJECTIVE STATEMENT
Pt is a 59 yo F who came to the ED amb c/o headache, dizziness and nausea x 2 weeks. States she feels like the room is spinning. No vision changes, no fever/chills. A/O x3, headache 4/10.

## 2019-05-25 NOTE — ED ADULT NURSE NOTE - NSIMPLEMENTINTERV_GEN_ALL_ED
Implemented All Fall with Harm Risk Interventions:  [place] to call system. Call bell, personal items and telephone within reach. Instruct patient to call for assistance. Room bathroom lighting operational. Non-slip footwear when patient is off stretcher. Physically safe environment: no spills, clutter or unnecessary equipment. Stretcher in lowest position, wheels locked, appropriate side rails in place. Provide visual cue, wrist band, yellow gown, etc. Monitor gait and stability. Monitor for mental status changes and reorient to person, place, and time. Review medications for side effects contributing to fall risk. Reinforce activity limits and safety measures with patient and family. Provide visual clues: red socks.

## 2019-05-25 NOTE — ED PROVIDER NOTE - ATTENDING CONTRIBUTION TO CARE
Attending MD Kalpana Monet:   I personally have seen and examined this patient.  Physician assistant note reviewed and agree on plan of care and except where noted.  See HPI, PE, and MDM for details.

## 2019-05-25 NOTE — ED PROVIDER NOTE - CARE PROVIDER_API CALL
Mo Meza (DO)  Neurology; Vascular Neurology  [address]  [place] [address]  Phone: [phone]  Fax: [phone]  Follow Up Time:

## 2019-05-25 NOTE — ED PROVIDER NOTE - CLINICAL SUMMARY MEDICAL DECISION MAKING FREE TEXT BOX
Attending Kalpana Monet: 59 y/o female prsenting with headache and dizziness for last few months. slightly worse over last few weeks. has had a previous MRI for symptoms which was reportedly unremarkbale. on exam no focal neurologic deficits. pt ambulatory. less likely acute posterior cva or intracranial pathology as pt well appearing without neurologic deficit. d/w pt importance of following up with neurology. pt asymptomatic while in the ed. less likely dissection as no current dizziness or neck pain. pt given close return precautoins. will d/c

## 2019-05-25 NOTE — ED PROVIDER NOTE - PHYSICAL EXAMINATION
Attending Kalpana Monet: Gen: NAD, heent: atrauamtic, eomi, perrla, mmm, op pink, uvula midline, neck; nttp, no nuchal rigidity, chest: nttp, no crepitus, cv: rrr, no murmurs, lungs: ctab, abd: soft, nontender, nondistended, no peritoneal signs, +BS, no guarding, ext: wwp, neg homans, skin: no rash, neuro: awake and alert, following commands, speech clear, sensation and strength intact, no focal deficits

## 2019-05-25 NOTE — ED PROVIDER NOTE - PROGRESS NOTE DETAILS
Attending Kalpana Monet: pt feeling better, labs unremarkable. ambulating without difficulty. denies any dizziness. d/w pt performing imaging. pt has had MRI for symptoms withotu resolution. neuro exam nonfocal and pt ambulating without difficulty. no bruits or neck pain to suggest dissection.w ill give neurology follow up. d/w pt importance of returning for any worsening symptoms.

## 2019-05-29 NOTE — PLAN
[FreeTextEntry3] : Called patient to see how she is feeling now. There was no answer on home or cell phones - left message to schedule f/u appt.

## 2019-05-29 NOTE — DISCUSSION/SUMMARY
[ER: _____] : ER: [unfilled] [FreeTextEntry1] : Pt visited [facility] ER on 5/25. ER Note:\par \par "57yo F with PMH hypothyroidism, depression, presenting with intermittent dizziness x 2 weeks. Pt reports non-room spinning dizziness that occurs when she goes from lying to standing, lasts a few minutes and resolves on own. [place] this way last at 4am today when she got up to go to bathroom. Reports associated generalized HA to top of head every day that resolves with tylenol. Currently HA is 4/10 and worse with palpation of scalp. Did not take anything for pain today. Pt reports nausea and feeling of acid reflux 2 days ago that has returned since drinking coffee this morning. Has not eaten today. Not currently dizzy. Took bus here. Reports she tried to make appointment with neuro but only availability was in August. Denies any fever/chills, head injury, vision changes, ear pain, CP, palpitations, SOB, vomiting, diarrhea, melena, BRBPR, urinary symptoms, numbness/tingling, recent travel. Takes ambien at night but denies any new meds or change in meds.\par \par Attending Kalpana Monet: 59 y/o female prsenting with headache and dizziness for last few months. slightly worse over last few weeks. has had a previous MRI for symptoms which was reportedly unremarkbale. on exam no focal neurologic deficits. pt ambulatory. less likely acute posterior cva or intracranial pathology as pt well appearing without neurologic deficit. d/w pt importance of following up with neurology. pt asymptomatic while in the ed. less likely dissection as no current dizziness or neck pain. pt given close return precautoins. will d/c"\par \par Of note pt underwent labs and EKG; results reviewed below:\par CBC WNL\par ESR 18\par TSH 1.67\par CMP WNL\par EKG NSR 67  \par \par Also reviewed old neurologic imaging:\par 6/26/17 MRI no contrast:\par \par Ventricles and sulci are unremarkable, there are nonspecific foci of \par hyperintense T2 and FLAIR signal in the white matter likely microvascular \par disease, with other etiologies including sequela of migraines  not \par excluded. There is no restricted diffusion, hemorrhage or midline shift.\par \par Partially empty sella with prominent bilateral optic nerve sheaths, this \par can be seen with pseudotumor cerebri.\par \par Proximal intradural vessels are unremarkable, there is slight tortuosity \par of the basilar artery, there a few tiny stenoses along the intracranial \par arterial left P1 segment and distal branch vasculature.\par \par 6/26/17 MRA head no contrast:\par Ventricles and sulci are unremarkable, there are nonspecific foci of \par hyperintense T2 and FLAIR signal in the white matter likely microvascular \par disease, with other etiologies including sequela of migraines  not \par excluded. There is no restricted diffusion, hemorrhage or midline shift.\par \par Partially empty sella with prominent bilateral optic nerve sheaths, this \par can be seen with pseudotumor cerebri.\par \par Proximal intradural vessels are unremarkable, there is slight tortuosity \par of the basilar artery, there a few tiny stenoses along the intracranial \par arterial left P1 segment and distal branch vasculature.\par \par Of note pt followed by neurology for migraines and neurosurgery for ACDF C4-C7, PMHX significant for post-operative left Oc's syndrome. \par \par \par \par

## 2019-06-01 PROCEDURE — G9005: CPT

## 2019-06-06 ENCOUNTER — APPOINTMENT (OUTPATIENT)
Dept: INTERNAL MEDICINE | Facility: CLINIC | Age: 59
End: 2019-06-06
Payer: MEDICAID

## 2019-06-06 ENCOUNTER — OUTPATIENT (OUTPATIENT)
Dept: OUTPATIENT SERVICES | Facility: HOSPITAL | Age: 59
LOS: 1 days | End: 2019-06-06
Payer: MEDICAID

## 2019-06-06 VITALS
HEIGHT: 62 IN | DIASTOLIC BLOOD PRESSURE: 70 MMHG | HEART RATE: 38 BPM | OXYGEN SATURATION: 98 % | WEIGHT: 132 LBS | BODY MASS INDEX: 24.29 KG/M2 | SYSTOLIC BLOOD PRESSURE: 110 MMHG

## 2019-06-06 DIAGNOSIS — G44.229 CHRONIC TENSION-TYPE HEADACHE, NOT INTRACTABLE: ICD-10-CM

## 2019-06-06 DIAGNOSIS — Z98.891 HISTORY OF UTERINE SCAR FROM PREVIOUS SURGERY: Chronic | ICD-10-CM

## 2019-06-06 DIAGNOSIS — I10 ESSENTIAL (PRIMARY) HYPERTENSION: ICD-10-CM

## 2019-06-06 DIAGNOSIS — S09.91XA UNSPECIFIED INJURY OF EAR, INITIAL ENCOUNTER: Chronic | ICD-10-CM

## 2019-06-06 PROCEDURE — G0463: CPT

## 2019-06-06 PROCEDURE — 99213 OFFICE O/P EST LOW 20 MIN: CPT | Mod: GE

## 2019-06-06 NOTE — ASSESSMENT
[FreeTextEntry1] : Pt is a 57 yo F w/ a PMHx hypothyroidism, depression, cervical OA s/p C4-7 anterior cervical discectomy and fusion c/b post-op Oc's Syndrome, early menopause (early 40s) presents for routine follow up\par \par ###Chronic headaches along with shoulder girdle muscle/joint pain:\par Previous MRI from 2017 mentioned possible pseudotumor cerebri. Agree with repeat MRI brain-script given by neuro. Differential also includes PMR vs fibromyalgia. Neuro exam non focal with no upper motor symptoms  \par Advised that she use Amitriptyline daily instead of prn  \par trial of Medrol dose pack for possible PMR- ESR neg in ED but can be atypical presentation \par Mobic prn refilled \par \par d/w Dr. Eugene

## 2019-06-06 NOTE — HISTORY OF PRESENT ILLNESS
[FreeTextEntry8] : Pt is a 57 yo F w/ a PMHx hypothyroidism, depression, cervical OA s/p C4-7 anterior cervical discectomy and fusion c/b post-op Oc's Syndrome, early menopause (early 40s) presents for routine follow up. Patient visited [facility] 2 weeks ago for dizziness and was discharged with a diagnosis of vertigo. No imaging done and ESR of <20 in ED. She complains of persistent headaches which have been worsening. Headaches have been going on for years and travel in location. They can be frontal, temporal or central on the top of her head. Usually bilateral and not associated with photophobia. Her mood is stable but she does feel frustrated by persistent headaches. She was seen by neurology on 6/4 and was asked to repeat MRI of brain.\par \par Along with headaches, she also endorses bilateral shoulder muscles and joint pain. She also issues with reading and needs to see an optometrist. Denies any fever, chills, N/V/D, chest pain, SOB, or syncope.

## 2019-06-10 ENCOUNTER — APPOINTMENT (OUTPATIENT)
Dept: MRI IMAGING | Facility: CLINIC | Age: 59
End: 2019-06-10
Payer: MEDICAID

## 2019-06-10 ENCOUNTER — OUTPATIENT (OUTPATIENT)
Dept: OUTPATIENT SERVICES | Facility: HOSPITAL | Age: 59
LOS: 1 days | End: 2019-06-10
Payer: MEDICAID

## 2019-06-10 DIAGNOSIS — Z98.891 HISTORY OF UTERINE SCAR FROM PREVIOUS SURGERY: Chronic | ICD-10-CM

## 2019-06-10 DIAGNOSIS — R51 HEADACHE: ICD-10-CM

## 2019-06-10 DIAGNOSIS — Z00.8 ENCOUNTER FOR OTHER GENERAL EXAMINATION: ICD-10-CM

## 2019-06-10 DIAGNOSIS — S09.91XA UNSPECIFIED INJURY OF EAR, INITIAL ENCOUNTER: Chronic | ICD-10-CM

## 2019-06-10 PROCEDURE — 70551 MRI BRAIN STEM W/O DYE: CPT

## 2019-06-10 PROCEDURE — 70551 MRI BRAIN STEM W/O DYE: CPT | Mod: 26

## 2019-06-13 DIAGNOSIS — G44.229 CHRONIC TENSION-TYPE HEADACHE, NOT INTRACTABLE: ICD-10-CM

## 2019-06-27 ENCOUNTER — APPOINTMENT (OUTPATIENT)
Dept: NEUROLOGY | Facility: CLINIC | Age: 59
End: 2019-06-27

## 2019-07-10 ENCOUNTER — RX RENEWAL (OUTPATIENT)
Age: 59
End: 2019-07-10

## 2019-07-16 NOTE — ED PROVIDER NOTE - NEUROLOGICAL SPEECH
Health Maintenance Due   Topic Date Due   • Cervical Cancer Screening  12/18/2014       Patient is due for topics as listed above but is not proceeding with Cervical cancer screening at this time.          clear

## 2019-08-01 ENCOUNTER — OUTPATIENT (OUTPATIENT)
Dept: OUTPATIENT SERVICES | Facility: HOSPITAL | Age: 59
LOS: 1 days | End: 2019-08-01
Payer: MEDICAID

## 2019-08-01 DIAGNOSIS — Z71.89 OTHER SPECIFIED COUNSELING: ICD-10-CM

## 2019-08-01 DIAGNOSIS — S09.91XA UNSPECIFIED INJURY OF EAR, INITIAL ENCOUNTER: Chronic | ICD-10-CM

## 2019-08-01 DIAGNOSIS — Z98.891 HISTORY OF UTERINE SCAR FROM PREVIOUS SURGERY: Chronic | ICD-10-CM

## 2019-08-01 PROCEDURE — G9001: CPT

## 2019-10-26 ENCOUNTER — EMERGENCY (EMERGENCY)
Facility: HOSPITAL | Age: 59
LOS: 1 days | Discharge: HOME | End: 2019-10-26
Attending: EMERGENCY MEDICINE
Payer: MEDICAID

## 2019-10-26 VITALS
DIASTOLIC BLOOD PRESSURE: 92 MMHG | RESPIRATION RATE: 20 BRPM | HEART RATE: 87 BPM | OXYGEN SATURATION: 100 % | WEIGHT: 130.07 LBS | HEIGHT: 62 IN | TEMPERATURE: 98 F | SYSTOLIC BLOOD PRESSURE: 154 MMHG

## 2019-10-26 DIAGNOSIS — S09.91XA UNSPECIFIED INJURY OF EAR, INITIAL ENCOUNTER: Chronic | ICD-10-CM

## 2019-10-26 DIAGNOSIS — Z98.891 HISTORY OF UTERINE SCAR FROM PREVIOUS SURGERY: Chronic | ICD-10-CM

## 2019-10-26 LAB
ALBUMIN SERPL ELPH-MCNC: 4.7 G/DL — SIGNIFICANT CHANGE UP (ref 3.3–5)
ALP SERPL-CCNC: 86 U/L — SIGNIFICANT CHANGE UP (ref 40–120)
ALT FLD-CCNC: 17 U/L — SIGNIFICANT CHANGE UP (ref 10–45)
ANION GAP SERPL CALC-SCNC: 19 MMOL/L — HIGH (ref 5–17)
APPEARANCE UR: CLEAR — SIGNIFICANT CHANGE UP
AST SERPL-CCNC: 20 U/L — SIGNIFICANT CHANGE UP (ref 10–40)
BACTERIA # UR AUTO: NEGATIVE — SIGNIFICANT CHANGE UP
BASOPHILS # BLD AUTO: 0.05 K/UL — SIGNIFICANT CHANGE UP (ref 0–0.2)
BASOPHILS NFR BLD AUTO: 0.8 % — SIGNIFICANT CHANGE UP (ref 0–2)
BILIRUB SERPL-MCNC: <0.1 MG/DL — LOW (ref 0.2–1.2)
BILIRUB UR-MCNC: NEGATIVE — SIGNIFICANT CHANGE UP
BUN SERPL-MCNC: 12 MG/DL — SIGNIFICANT CHANGE UP (ref 7–23)
CALCIUM SERPL-MCNC: 9.3 MG/DL — SIGNIFICANT CHANGE UP (ref 8.4–10.5)
CHLORIDE SERPL-SCNC: 100 MMOL/L — SIGNIFICANT CHANGE UP (ref 96–108)
CO2 SERPL-SCNC: 22 MMOL/L — SIGNIFICANT CHANGE UP (ref 22–31)
COLOR SPEC: COLORLESS — SIGNIFICANT CHANGE UP
CREAT SERPL-MCNC: 0.6 MG/DL — SIGNIFICANT CHANGE UP (ref 0.5–1.3)
DIFF PNL FLD: NEGATIVE — SIGNIFICANT CHANGE UP
EOSINOPHIL # BLD AUTO: 0.17 K/UL — SIGNIFICANT CHANGE UP (ref 0–0.5)
EOSINOPHIL NFR BLD AUTO: 2.6 % — SIGNIFICANT CHANGE UP (ref 0–6)
EPI CELLS # UR: 0 /HPF — SIGNIFICANT CHANGE UP
GLUCOSE SERPL-MCNC: 100 MG/DL — HIGH (ref 70–99)
GLUCOSE UR QL: NEGATIVE — SIGNIFICANT CHANGE UP
HCT VFR BLD CALC: 36.6 % — SIGNIFICANT CHANGE UP (ref 34.5–45)
HGB BLD-MCNC: 12.3 G/DL — SIGNIFICANT CHANGE UP (ref 11.5–15.5)
HYALINE CASTS # UR AUTO: 0 /LPF — SIGNIFICANT CHANGE UP (ref 0–2)
IMM GRANULOCYTES NFR BLD AUTO: 0.3 % — SIGNIFICANT CHANGE UP (ref 0–1.5)
KETONES UR-MCNC: NEGATIVE — SIGNIFICANT CHANGE UP
LEUKOCYTE ESTERASE UR-ACNC: NEGATIVE — SIGNIFICANT CHANGE UP
LYMPHOCYTES # BLD AUTO: 2.09 K/UL — SIGNIFICANT CHANGE UP (ref 1–3.3)
LYMPHOCYTES # BLD AUTO: 31.5 % — SIGNIFICANT CHANGE UP (ref 13–44)
MAGNESIUM SERPL-MCNC: 2.4 MG/DL — SIGNIFICANT CHANGE UP (ref 1.6–2.6)
MCHC RBC-ENTMCNC: 29.6 PG — SIGNIFICANT CHANGE UP (ref 27–34)
MCHC RBC-ENTMCNC: 33.6 GM/DL — SIGNIFICANT CHANGE UP (ref 32–36)
MCV RBC AUTO: 88 FL — SIGNIFICANT CHANGE UP (ref 80–100)
MONOCYTES # BLD AUTO: 0.46 K/UL — SIGNIFICANT CHANGE UP (ref 0–0.9)
MONOCYTES NFR BLD AUTO: 6.9 % — SIGNIFICANT CHANGE UP (ref 2–14)
NEUTROPHILS # BLD AUTO: 3.85 K/UL — SIGNIFICANT CHANGE UP (ref 1.8–7.4)
NEUTROPHILS NFR BLD AUTO: 57.9 % — SIGNIFICANT CHANGE UP (ref 43–77)
NITRITE UR-MCNC: NEGATIVE — SIGNIFICANT CHANGE UP
NRBC # BLD: 0 /100 WBCS — SIGNIFICANT CHANGE UP (ref 0–0)
PH UR: 6.5 — SIGNIFICANT CHANGE UP (ref 5–8)
PHOSPHATE SERPL-MCNC: 2.9 MG/DL — SIGNIFICANT CHANGE UP (ref 2.5–4.5)
PLATELET # BLD AUTO: 319 K/UL — SIGNIFICANT CHANGE UP (ref 150–400)
POTASSIUM SERPL-MCNC: 3.9 MMOL/L — SIGNIFICANT CHANGE UP (ref 3.5–5.3)
POTASSIUM SERPL-SCNC: 3.9 MMOL/L — SIGNIFICANT CHANGE UP (ref 3.5–5.3)
PROT SERPL-MCNC: 8 G/DL — SIGNIFICANT CHANGE UP (ref 6–8.3)
PROT UR-MCNC: NEGATIVE — SIGNIFICANT CHANGE UP
RBC # BLD: 4.16 M/UL — SIGNIFICANT CHANGE UP (ref 3.8–5.2)
RBC # FLD: 12.9 % — SIGNIFICANT CHANGE UP (ref 10.3–14.5)
RBC CASTS # UR COMP ASSIST: 1 /HPF — SIGNIFICANT CHANGE UP (ref 0–4)
SODIUM SERPL-SCNC: 141 MMOL/L — SIGNIFICANT CHANGE UP (ref 135–145)
SP GR SPEC: 1 — LOW (ref 1.01–1.02)
UROBILINOGEN FLD QL: NEGATIVE — SIGNIFICANT CHANGE UP
WBC # BLD: 6.64 K/UL — SIGNIFICANT CHANGE UP (ref 3.8–10.5)
WBC # FLD AUTO: 6.64 K/UL — SIGNIFICANT CHANGE UP (ref 3.8–10.5)
WBC UR QL: 1 /HPF — SIGNIFICANT CHANGE UP (ref 0–5)

## 2019-10-26 PROCEDURE — 85027 COMPLETE CBC AUTOMATED: CPT

## 2019-10-26 PROCEDURE — 84100 ASSAY OF PHOSPHORUS: CPT

## 2019-10-26 PROCEDURE — 83735 ASSAY OF MAGNESIUM: CPT

## 2019-10-26 PROCEDURE — 93005 ELECTROCARDIOGRAM TRACING: CPT

## 2019-10-26 PROCEDURE — 36415 COLL VENOUS BLD VENIPUNCTURE: CPT

## 2019-10-26 PROCEDURE — 99283 EMERGENCY DEPT VISIT LOW MDM: CPT

## 2019-10-26 PROCEDURE — 93010 ELECTROCARDIOGRAM REPORT: CPT

## 2019-10-26 PROCEDURE — 99284 EMERGENCY DEPT VISIT MOD MDM: CPT

## 2019-10-26 PROCEDURE — 80053 COMPREHEN METABOLIC PANEL: CPT

## 2019-10-26 PROCEDURE — 81001 URINALYSIS AUTO W/SCOPE: CPT

## 2019-10-26 RX ORDER — SODIUM CHLORIDE 9 MG/ML
1000 INJECTION INTRAMUSCULAR; INTRAVENOUS; SUBCUTANEOUS ONCE
Refills: 0 | Status: COMPLETED | OUTPATIENT
Start: 2019-10-26 | End: 2019-10-26

## 2019-10-26 RX ORDER — ACETAMINOPHEN 500 MG
650 TABLET ORAL ONCE
Refills: 0 | Status: COMPLETED | OUTPATIENT
Start: 2019-10-26 | End: 2019-10-26

## 2019-10-26 RX ADMIN — SODIUM CHLORIDE 1000 MILLILITER(S): 9 INJECTION INTRAMUSCULAR; INTRAVENOUS; SUBCUTANEOUS at 20:12

## 2019-10-26 RX ADMIN — Medication 650 MILLIGRAM(S): at 20:35

## 2019-10-26 RX ADMIN — SODIUM CHLORIDE 1000 MILLILITER(S): 9 INJECTION INTRAMUSCULAR; INTRAVENOUS; SUBCUTANEOUS at 20:24

## 2019-10-26 RX ADMIN — Medication 650 MILLIGRAM(S): at 20:24

## 2019-10-26 NOTE — ED PROVIDER NOTE - NSFOLLOWUPINSTRUCTIONS_ED_ALL_ED_FT
Follow up with your Primary Care Physician within the next 2-3 days  Bring a copy of your test results with you to your appointment  Continue your current medication regimen  Return to the Emergency Room if you experience new or worsening symptoms severe headache, nausea, vomiting, chest pain, shortness of breath, abdominal pain, slurred speech, dizziness, gait abnormality   Follow up with your neurologist within one week   Take Tylenol as directed every 6 hours as needed for headache and alternate with NSAIDs such as Motrin, Aleve, Advil, Ibuprofen with food every 8 hours  Remain well hydrated and avoid skipping meals

## 2019-10-26 NOTE — ED PROVIDER NOTE - ATTENDING CONTRIBUTION TO CARE
****ATTENDING**** 57yo f hx listed pw dizziness x months. States she has intermittent symptoms often that are at times self limiting. Tday patient had an episode of lightheadedness/dizziness with a HA. Denies room spinning, recently tread fo L ear infection that is improved. No change in vision. No nausea or vomiting. HA is resolved now. On exam, Patient is awake,alert,oriented x 3. Patient is well appearing and in no acute distress. PERRL, EOMI. Patient's chest is clear to ausculation, +s1s2. Abdomen is soft nd/nt +BS. Extremity with no swelling or calf tenderness. CN2-12 intact 5/5 UE/LE nml sensation and coordination. Gait stable.  PT well appearing. Check labs, EKG and treat w tylenol and IVF.

## 2019-10-26 NOTE — ED PROVIDER NOTE - PATIENT PORTAL LINK FT
You can access the FollowMyHealth Patient Portal offered by [facility] by registering at the following website: [email or web address]. By joining [employer]’s FollowMyHealth portal, you will also be able to view your health information using other applications (apps) compatible with our system.

## 2019-10-26 NOTE — ED PROVIDER NOTE - PHYSICAL EXAMINATION
Neuro: normal SCm/trap b/l, normal finger to nose b/l normal shin to leg b/l  Ear: hole in left TM no effusion no erythematous canal, right ear no bulging TM, no erythema to canal

## 2019-10-26 NOTE — ED ADULT NURSE NOTE - OBJECTIVE STATEMENT
59 yo female from home c/o dizziness. Pt A&OX4, PERRL, neuro exam intact. Pt c/o dizziness when laying down and turning head to the right. Pt denies n/v/d. pt denies SOB, chx pn, LOC. abd soft nontender. ls equal clr bilat apices ot bases. pt afebrile, VS stable. IV access obtained in right AC via 18G catheter. labs drawn and sent, fluid bolus administered, will continue to monitor.

## 2019-10-26 NOTE — ED PROVIDER NOTE - OBJECTIVE STATEMENT
57 y/o female PMHx hypothyroidism, depression, cervical OA s/p C4-7 anterior cervical discectomy and fusion c/b post-op Oc's Syndrome, PMR, pseudotumor cerebri, recent left ear infection 2 weeks ago treated with drops now presenting with an episode of lightheadedness/ dizziness and generalized headache 2 hours ago. Patient stated she was bending over stood up, felt extremely dizzy, and required assistance to ambulate as if she going to fall over. Patient continues to report of lightheadedness but no longer dizzy and without room spinning. Patient described headache to be throbbing and has not taken medication as of yet. Patient had similar presenting in 5/2019 with negative work up and was recommended outpatient w/u. Per Maciel SCHREIBER, patient had repeat MRI 6/2019 which was consistent with idiopathic intracranial hypertension seen previously on 2017 MRI. Per neuro Maciel SCHREIBER note pt was recommended amitrpyline however pt non compliant. Patient stated she does not eat or drink much. Patient denied LOC, slurred speech, CP, SOB, abdominal pain, N/V/D, fever chills, cough, numbness, paresthesias, weakness in extremity, back pain, neck pain,

## 2019-10-26 NOTE — ED PROVIDER NOTE - PROGRESS NOTE DETAILS
JOSÉ MANUEL Mohamud" patient feels well after IVF and Tylenol requesting discharge with outpatient f/u Patient is reassessed, states feeling much better at this time. Ambulatory in ED. I discussed patient's results with them and explained to follow up with PMD as directed. RGUJRAL

## 2019-11-05 PROBLEM — G93.2 BENIGN INTRACRANIAL HYPERTENSION: Chronic | Status: ACTIVE | Noted: 2019-10-26

## 2019-11-06 ENCOUNTER — OUTPATIENT (OUTPATIENT)
Dept: OUTPATIENT SERVICES | Facility: HOSPITAL | Age: 59
LOS: 1 days | End: 2019-11-06
Payer: MEDICAID

## 2019-11-06 ENCOUNTER — APPOINTMENT (OUTPATIENT)
Dept: INTERNAL MEDICINE | Facility: CLINIC | Age: 59
End: 2019-11-06
Payer: MEDICAID

## 2019-11-06 VITALS
HEART RATE: 76 BPM | BODY MASS INDEX: 24.84 KG/M2 | WEIGHT: 135 LBS | DIASTOLIC BLOOD PRESSURE: 62 MMHG | HEIGHT: 62 IN | SYSTOLIC BLOOD PRESSURE: 110 MMHG | OXYGEN SATURATION: 98 %

## 2019-11-06 DIAGNOSIS — I10 ESSENTIAL (PRIMARY) HYPERTENSION: ICD-10-CM

## 2019-11-06 DIAGNOSIS — Z98.891 HISTORY OF UTERINE SCAR FROM PREVIOUS SURGERY: Chronic | ICD-10-CM

## 2019-11-06 DIAGNOSIS — S09.91XA UNSPECIFIED INJURY OF EAR, INITIAL ENCOUNTER: Chronic | ICD-10-CM

## 2019-11-06 PROCEDURE — G0463: CPT | Mod: 25

## 2019-11-06 PROCEDURE — 90688 IIV4 VACCINE SPLT 0.5 ML IM: CPT

## 2019-11-06 PROCEDURE — G0008: CPT

## 2019-11-06 PROCEDURE — 99213 OFFICE O/P EST LOW 20 MIN: CPT | Mod: GE

## 2019-11-06 RX ORDER — AMITRIPTYLINE HYDROCHLORIDE 25 MG/1
25 TABLET, FILM COATED ORAL DAILY
Qty: 30 | Refills: 3 | Status: DISCONTINUED | COMMUNITY
Start: 2019-02-25 | End: 2019-11-06

## 2019-11-06 RX ORDER — METHYLPREDNISOLONE 4 MG/1
4 TABLET ORAL
Qty: 1 | Refills: 0 | Status: DISCONTINUED | COMMUNITY
Start: 2019-06-06 | End: 2019-11-06

## 2019-11-06 RX ORDER — MELOXICAM 7.5 MG/1
7.5 TABLET ORAL DAILY
Qty: 10 | Refills: 1 | Status: DISCONTINUED | COMMUNITY
Start: 2018-11-29 | End: 2019-11-06

## 2019-11-06 NOTE — HISTORY OF PRESENT ILLNESS
[FreeTextEntry1] : f/u  [de-identified] : 60 yo F w/ a PMHx hypothyroidism, depression, cervical OA s/p C4-7 anterior cervical discectomy and fusion c/b post-op Oc's Syndrome, early menopause (early 40s) presenting for f/u visit after ED for persistent dizziness.\par \par Pt received IV fluids in ED and was discharged after feeling better.  She reports recently switching neurologists who advised her to stop taking meclizine.  Pt reports that her symptoms are not related to changes in head position and she does not get dizzy upon standing.  Orthostatic VS today 130/78 --> 120/78; no dizziness upon laying to sitting or standing position \par \par \par \par

## 2019-11-06 NOTE — ASSESSMENT
[FreeTextEntry1] : As per daughter believed to be BPPV\par Get Neurology records Sign medical release

## 2019-11-06 NOTE — REVIEW OF SYSTEMS
[Fever] : no fever [Chills] : no chills [Pain] : no pain [Vision Problems] : no vision problems [Chest Pain] : no chest pain [Palpitations] : no palpitations [Lower Ext Edema] : no lower extremity edema [Orthopnea] : no orthopnea [Shortness Of Breath] : no shortness of breath [Wheezing] : no wheezing [Cough] : no cough [Abdominal Pain] : no abdominal pain [Nausea] : no nausea [Constipation] : no constipation [Diarrhea] : diarrhea [Vomiting] : no vomiting [Heartburn] : no heartburn [Melena] : no melena [Dysuria] : no dysuria [Hematuria] : no hematuria [Joint Pain] : no joint pain [Muscle Pain] : no muscle pain

## 2019-11-06 NOTE — PHYSICAL EXAM
[Well Developed] : well developed [Well-Appearing] : well-appearing [Normal Sclera/Conjunctiva] : normal sclera/conjunctiva [EOMI] : extraocular movements intact [No Respiratory Distress] : no respiratory distress  [No Accessory Muscle Use] : no accessory muscle use [Clear to Auscultation] : lungs were clear to auscultation bilaterally [Normal Rate] : normal rate  [Regular Rhythm] : with a regular rhythm [Normal S1, S2] : normal S1 and S2 [No Murmur] : no murmur heard [No Edema] : there was no peripheral edema [Soft] : abdomen soft [Non Tender] : non-tender [Non-distended] : non-distended [No Masses] : no abdominal mass palpated [No HSM] : no HSM [Normal Bowel Sounds] : normal bowel sounds

## 2019-11-08 DIAGNOSIS — Z23 ENCOUNTER FOR IMMUNIZATION: ICD-10-CM

## 2019-11-08 DIAGNOSIS — R42 DIZZINESS AND GIDDINESS: ICD-10-CM

## 2019-11-18 ENCOUNTER — NON-APPOINTMENT (OUTPATIENT)
Age: 59
End: 2019-11-18

## 2019-11-18 ENCOUNTER — APPOINTMENT (OUTPATIENT)
Dept: OPHTHALMOLOGY | Facility: CLINIC | Age: 59
End: 2019-11-18
Payer: MEDICAID

## 2019-11-18 PROCEDURE — 92015 DETERMINE REFRACTIVE STATE: CPT

## 2019-11-18 PROCEDURE — 92012 INTRM OPH EXAM EST PATIENT: CPT

## 2019-12-10 ENCOUNTER — APPOINTMENT (OUTPATIENT)
Dept: PAIN MANAGEMENT | Facility: CLINIC | Age: 59
End: 2019-12-10

## 2019-12-10 VITALS
WEIGHT: 135 LBS | HEART RATE: 74 BPM | BODY MASS INDEX: 24.84 KG/M2 | DIASTOLIC BLOOD PRESSURE: 79 MMHG | HEIGHT: 62 IN | SYSTOLIC BLOOD PRESSURE: 136 MMHG

## 2019-12-12 ENCOUNTER — APPOINTMENT (OUTPATIENT)
Dept: MAMMOGRAPHY | Facility: IMAGING CENTER | Age: 59
End: 2019-12-12

## 2019-12-12 ENCOUNTER — OTHER (OUTPATIENT)
Age: 59
End: 2019-12-12

## 2019-12-17 ENCOUNTER — APPOINTMENT (OUTPATIENT)
Dept: NEUROSURGERY | Facility: CLINIC | Age: 59
End: 2019-12-17

## 2019-12-20 ENCOUNTER — EMERGENCY (EMERGENCY)
Facility: HOSPITAL | Age: 59
LOS: 1 days | Discharge: HOME | End: 2019-12-20
Attending: EMERGENCY MEDICINE
Payer: MEDICAID

## 2019-12-20 VITALS
WEIGHT: 132.94 LBS | TEMPERATURE: 98 F | DIASTOLIC BLOOD PRESSURE: 90 MMHG | HEART RATE: 86 BPM | SYSTOLIC BLOOD PRESSURE: 149 MMHG | HEIGHT: 62 IN | RESPIRATION RATE: 978 BRPM

## 2019-12-20 VITALS
RESPIRATION RATE: 16 BRPM | SYSTOLIC BLOOD PRESSURE: 125 MMHG | HEART RATE: 80 BPM | OXYGEN SATURATION: 100 % | DIASTOLIC BLOOD PRESSURE: 80 MMHG | TEMPERATURE: 98 F

## 2019-12-20 DIAGNOSIS — Z98.891 HISTORY OF UTERINE SCAR FROM PREVIOUS SURGERY: Chronic | ICD-10-CM

## 2019-12-20 DIAGNOSIS — S09.91XA UNSPECIFIED INJURY OF EAR, INITIAL ENCOUNTER: Chronic | ICD-10-CM

## 2019-12-20 LAB
ALBUMIN SERPL ELPH-MCNC: 4.8 G/DL — SIGNIFICANT CHANGE UP (ref 3.3–5)
ALP SERPL-CCNC: 74 U/L — SIGNIFICANT CHANGE UP (ref 40–120)
ALT FLD-CCNC: 20 U/L — SIGNIFICANT CHANGE UP (ref 10–45)
ANION GAP SERPL CALC-SCNC: 14 MMOL/L — SIGNIFICANT CHANGE UP (ref 5–17)
APPEARANCE UR: CLEAR — SIGNIFICANT CHANGE UP
AST SERPL-CCNC: 18 U/L — SIGNIFICANT CHANGE UP (ref 10–40)
BASOPHILS # BLD AUTO: 0.04 K/UL — SIGNIFICANT CHANGE UP (ref 0–0.2)
BASOPHILS NFR BLD AUTO: 0.6 % — SIGNIFICANT CHANGE UP (ref 0–2)
BILIRUB SERPL-MCNC: 0.1 MG/DL — LOW (ref 0.2–1.2)
BILIRUB UR-MCNC: NEGATIVE — SIGNIFICANT CHANGE UP
BUN SERPL-MCNC: 7 MG/DL — SIGNIFICANT CHANGE UP (ref 7–23)
CALCIUM SERPL-MCNC: 9.7 MG/DL — SIGNIFICANT CHANGE UP (ref 8.4–10.5)
CHLORIDE SERPL-SCNC: 103 MMOL/L — SIGNIFICANT CHANGE UP (ref 96–108)
CO2 SERPL-SCNC: 22 MMOL/L — SIGNIFICANT CHANGE UP (ref 22–31)
COLOR SPEC: COLORLESS — SIGNIFICANT CHANGE UP
CREAT SERPL-MCNC: 0.62 MG/DL — SIGNIFICANT CHANGE UP (ref 0.5–1.3)
DIFF PNL FLD: ABNORMAL
EOSINOPHIL # BLD AUTO: 0.13 K/UL — SIGNIFICANT CHANGE UP (ref 0–0.5)
EOSINOPHIL NFR BLD AUTO: 1.9 % — SIGNIFICANT CHANGE UP (ref 0–6)
GAS PNL BLDV: SIGNIFICANT CHANGE UP
GAS PNL BLDV: SIGNIFICANT CHANGE UP
GLUCOSE SERPL-MCNC: 93 MG/DL — SIGNIFICANT CHANGE UP (ref 70–99)
GLUCOSE UR QL: NEGATIVE — SIGNIFICANT CHANGE UP
HCT VFR BLD CALC: 41.5 % — SIGNIFICANT CHANGE UP (ref 34.5–45)
HGB BLD-MCNC: 13.6 G/DL — SIGNIFICANT CHANGE UP (ref 11.5–15.5)
IMM GRANULOCYTES NFR BLD AUTO: 0.1 % — SIGNIFICANT CHANGE UP (ref 0–1.5)
INR BLD: 1.04 RATIO — SIGNIFICANT CHANGE UP (ref 0.88–1.16)
KETONES UR-MCNC: NEGATIVE — SIGNIFICANT CHANGE UP
LEUKOCYTE ESTERASE UR-ACNC: NEGATIVE — SIGNIFICANT CHANGE UP
LIDOCAIN IGE QN: 12 U/L — SIGNIFICANT CHANGE UP (ref 7–60)
LYMPHOCYTES # BLD AUTO: 1.35 K/UL — SIGNIFICANT CHANGE UP (ref 1–3.3)
LYMPHOCYTES # BLD AUTO: 19.3 % — SIGNIFICANT CHANGE UP (ref 13–44)
MCHC RBC-ENTMCNC: 29.6 PG — SIGNIFICANT CHANGE UP (ref 27–34)
MCHC RBC-ENTMCNC: 32.8 GM/DL — SIGNIFICANT CHANGE UP (ref 32–36)
MCV RBC AUTO: 90.4 FL — SIGNIFICANT CHANGE UP (ref 80–100)
MONOCYTES # BLD AUTO: 0.51 K/UL — SIGNIFICANT CHANGE UP (ref 0–0.9)
MONOCYTES NFR BLD AUTO: 7.3 % — SIGNIFICANT CHANGE UP (ref 2–14)
NEUTROPHILS # BLD AUTO: 4.95 K/UL — SIGNIFICANT CHANGE UP (ref 1.8–7.4)
NEUTROPHILS NFR BLD AUTO: 70.8 % — SIGNIFICANT CHANGE UP (ref 43–77)
NITRITE UR-MCNC: NEGATIVE — SIGNIFICANT CHANGE UP
NRBC # BLD: 0 /100 WBCS — SIGNIFICANT CHANGE UP (ref 0–0)
PH UR: 6 — SIGNIFICANT CHANGE UP (ref 5–8)
PLATELET # BLD AUTO: 302 K/UL — SIGNIFICANT CHANGE UP (ref 150–400)
POTASSIUM SERPL-MCNC: 3.6 MMOL/L — SIGNIFICANT CHANGE UP (ref 3.5–5.3)
POTASSIUM SERPL-SCNC: 3.6 MMOL/L — SIGNIFICANT CHANGE UP (ref 3.5–5.3)
PROT SERPL-MCNC: 8 G/DL — SIGNIFICANT CHANGE UP (ref 6–8.3)
PROT UR-MCNC: NEGATIVE — SIGNIFICANT CHANGE UP
PROTHROM AB SERPL-ACNC: 12 SEC — SIGNIFICANT CHANGE UP (ref 10–12.9)
RBC # BLD: 4.59 M/UL — SIGNIFICANT CHANGE UP (ref 3.8–5.2)
RBC # FLD: 13 % — SIGNIFICANT CHANGE UP (ref 10.3–14.5)
SODIUM SERPL-SCNC: 139 MMOL/L — SIGNIFICANT CHANGE UP (ref 135–145)
SP GR SPEC: 1 — LOW (ref 1.01–1.02)
UROBILINOGEN FLD QL: NEGATIVE — SIGNIFICANT CHANGE UP
WBC # BLD: 6.99 K/UL — SIGNIFICANT CHANGE UP (ref 3.8–10.5)
WBC # FLD AUTO: 6.99 K/UL — SIGNIFICANT CHANGE UP (ref 3.8–10.5)

## 2019-12-20 PROCEDURE — 82947 ASSAY GLUCOSE BLOOD QUANT: CPT

## 2019-12-20 PROCEDURE — 85014 HEMATOCRIT: CPT

## 2019-12-20 PROCEDURE — 84295 ASSAY OF SERUM SODIUM: CPT

## 2019-12-20 PROCEDURE — 84132 ASSAY OF SERUM POTASSIUM: CPT

## 2019-12-20 PROCEDURE — 80053 COMPREHEN METABOLIC PANEL: CPT

## 2019-12-20 PROCEDURE — 85027 COMPLETE CBC AUTOMATED: CPT

## 2019-12-20 PROCEDURE — 74177 CT ABD & PELVIS W/CONTRAST: CPT

## 2019-12-20 PROCEDURE — 87086 URINE CULTURE/COLONY COUNT: CPT

## 2019-12-20 PROCEDURE — 74177 CT ABD & PELVIS W/CONTRAST: CPT | Mod: 26

## 2019-12-20 PROCEDURE — 83690 ASSAY OF LIPASE: CPT

## 2019-12-20 PROCEDURE — 99284 EMERGENCY DEPT VISIT MOD MDM: CPT | Mod: 25

## 2019-12-20 PROCEDURE — 82330 ASSAY OF CALCIUM: CPT

## 2019-12-20 PROCEDURE — 85610 PROTHROMBIN TIME: CPT

## 2019-12-20 PROCEDURE — 82803 BLOOD GASES ANY COMBINATION: CPT

## 2019-12-20 PROCEDURE — 81001 URINALYSIS AUTO W/SCOPE: CPT

## 2019-12-20 PROCEDURE — 82435 ASSAY OF BLOOD CHLORIDE: CPT

## 2019-12-20 PROCEDURE — 96374 THER/PROPH/DIAG INJ IV PUSH: CPT | Mod: XU

## 2019-12-20 PROCEDURE — 96361 HYDRATE IV INFUSION ADD-ON: CPT | Mod: XU

## 2019-12-20 PROCEDURE — 83605 ASSAY OF LACTIC ACID: CPT

## 2019-12-20 PROCEDURE — 99284 EMERGENCY DEPT VISIT MOD MDM: CPT

## 2019-12-20 RX ORDER — ACETAMINOPHEN 500 MG
975 TABLET ORAL ONCE
Refills: 0 | Status: COMPLETED | OUTPATIENT
Start: 2019-12-20 | End: 2019-12-20

## 2019-12-20 RX ORDER — FAMOTIDINE 10 MG/ML
20 INJECTION INTRAVENOUS ONCE
Refills: 0 | Status: COMPLETED | OUTPATIENT
Start: 2019-12-20 | End: 2019-12-20

## 2019-12-20 RX ORDER — SODIUM CHLORIDE 9 MG/ML
1000 INJECTION INTRAMUSCULAR; INTRAVENOUS; SUBCUTANEOUS ONCE
Refills: 0 | Status: COMPLETED | OUTPATIENT
Start: 2019-12-20 | End: 2019-12-20

## 2019-12-20 RX ADMIN — FAMOTIDINE 20 MILLIGRAM(S): 10 INJECTION INTRAVENOUS at 09:46

## 2019-12-20 RX ADMIN — Medication 975 MILLIGRAM(S): at 11:17

## 2019-12-20 RX ADMIN — Medication 30 MILLILITER(S): at 09:46

## 2019-12-20 RX ADMIN — SODIUM CHLORIDE 1000 MILLILITER(S): 9 INJECTION INTRAMUSCULAR; INTRAVENOUS; SUBCUTANEOUS at 11:15

## 2019-12-20 RX ADMIN — SODIUM CHLORIDE 1000 MILLILITER(S): 9 INJECTION INTRAMUSCULAR; INTRAVENOUS; SUBCUTANEOUS at 09:45

## 2019-12-20 NOTE — ED PROVIDER NOTE - PROGRESS NOTE DETAILS
Pt with continued abd pain, will scan. called Dr. Fritz's office to discuss CT and ? treatment with abx and follow up with his group. -Hazel Schrader PA-C called Dr. Fritz's office, left message with  for call back, will send him a message for c/b. -Hazel Schrader PA-C Spoke with GI fellow, discussed pts symptoms and CT findings, recommends pt be PO challenged if able to tolerate then can go home with BRAT diet, antiemetics and if symptoms worsen or unchanged can follow up in office. -Hazel Schrader PA-C

## 2019-12-20 NOTE — ED PROVIDER NOTE - ATTENDING CONTRIBUTION TO CARE
59y F here with c/o abd pain, episode of subjective fever 3 days ago, nausea, diarrhea. Abd pain is located epigastric/periumbilical area radiates around to L flank. Also endorsing watery, non bloody diarrhea. No vomiting. Pt has had prior abd surgery (c/section). Exam well appearing nad, RRR, lung cta BL, +Bs, soft rezkvynmkx-QTD-JHC TTP, neg murphys, no grr, ext wwp. Suspect viral enteritis picture, less likley acute abdominal 59y F here with c/o abd pain, episode of subjective fever 3 days ago, nausea, diarrhea. Abd pain is located epigastric/periumbilical area radiates around to L flank. Also endorsing watery, non bloody diarrhea. No vomiting. Pt has had prior abd surgery (c/section). Exam well appearing nad, RRR, lung cta BL, +Bs, soft pyeowqnnzk-UCM-JPT TTP, neg murphys, no grr, ext wwp. Suspect viral enteritis picture, less likley acute abdominal infectious process. Check labs, UA, IVF, re-eval.

## 2019-12-20 NOTE — ED PROVIDER NOTE - NSFOLLOWUPINSTRUCTIONS_ED_ALL_ED_FT
- stay hydrated.   -follow BRAT diet-bread/bananas, rice, applesauce and toast  - follow up with your pcp in 1-2 days.  - follow up with GI if symptoms worsen or continue  - return if symptoms worsen, fever, weakness, numbness/tingling, difficulty ambulating, unabel to eat or drink, dizziness and all other concerns.

## 2019-12-20 NOTE — ED PROVIDER NOTE - PATIENT PORTAL LINK FT
You can access the FollowMyHealth Patient Portal offered by [facility] by registering at the following website: [email or web address]. By joining [facility]’s FollowMyHealth portal, you will also be able to view your health information using other applications (apps) compatible with our system.

## 2019-12-20 NOTE — ED ADULT NURSE REASSESSMENT NOTE - NS ED NURSE REASSESS COMMENT FT1
Pt remains stable in the ED. Resting comfortably in bed with family at the bedside. A&Ox3. Breathing spontaneously and unlabored. NAD. Pt reported that pain has decreased and that she is feeling better. Will continue to monitor. Awaiting PO challenge.

## 2019-12-20 NOTE — ED PROVIDER NOTE - PHYSICAL EXAMINATION
A&Ox3, NAD. NCAT. PERRL, EOMI. Neck supple, no LAD. Lungs CTAB. +S1S2, RRR, No m/r/g. Abd soft, +lUQ/epigastric ttp, ND, +BS, no rebound or guarding. Extremities: cap refill <2, pulses in distal extremities 4+, no edema. Skin without rash. CN II-XII intact. Strength 5/5 UE/LE. Sensations intact throughout.

## 2019-12-20 NOTE — ED ADULT NURSE REASSESSMENT NOTE - NS ED NURSE REASSESS COMMENT FT1
Pt remains stable in the ED. Resting comfortably in bed with family at the bedside. A&Ox3. Breathing spontaneously and unlabored. NAD. Will continue to monitor. Awaiting CT
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
## 2019-12-20 NOTE — ED PROVIDER NOTE - OBJECTIVE STATEMENT
60 yo female with pmh 60 yo female with pmh hypothyroidism, anxiety, presenting with 3 days sharp epigastric abdominal pain which radiates to her left side and back with associated multiple episodes of watery stools. Pt states 3 days ago she was at her nieces house for dinner, went home and abd pain started, the next day diarrhea began multiple all day with worsening abd pain. pt took peptobismol and tylenol for symptoms without relief. + nausea, no vomiting, pt able to tolerate some pedialyte. pt believed symptoms to have improved yesterday but returned today worse than before. No other family members with similar symptoms, no fevers, chills, surgical hx, no bloody stools, no NSAID use or EtOH use, no recent travel, abx use or hospitalizations, no dysuria, hematuria.

## 2019-12-20 NOTE — ED PROVIDER NOTE - CARE PROVIDER_API CALL
Sandoval Fritz (DO)  Gastroenterology; Internal Medicine  [address] [place] [address]  Phone: [phone]  Fax: [phone]  Follow Up Time: 4-6 Days

## 2019-12-20 NOTE — ED ADULT NURSE REASSESSMENT NOTE - NS ED NURSE REASSESS COMMENT FT1
Pt remains stable in the ED. Resting comfortably in bed with family at the bedside. A&Ox3. Breathing spontaneously and unlabored. NAD. Pt reported pain, given Tylenol and blanket. Will continue to monitor. Awaiting dispo.
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
## 2019-12-21 LAB
CULTURE RESULTS: SIGNIFICANT CHANGE UP
SPECIMEN SOURCE: SIGNIFICANT CHANGE UP

## 2020-01-15 ENCOUNTER — MED ADMIN CHARGE (OUTPATIENT)
Age: 60
End: 2020-01-15

## 2020-01-15 ENCOUNTER — OUTPATIENT (OUTPATIENT)
Dept: OUTPATIENT SERVICES | Facility: HOSPITAL | Age: 60
LOS: 1 days | End: 2020-01-15
Payer: MEDICAID

## 2020-01-15 ENCOUNTER — APPOINTMENT (OUTPATIENT)
Dept: INTERNAL MEDICINE | Facility: CLINIC | Age: 60
End: 2020-01-15
Payer: MEDICAID

## 2020-01-15 DIAGNOSIS — S09.91XA UNSPECIFIED INJURY OF EAR, INITIAL ENCOUNTER: Chronic | ICD-10-CM

## 2020-01-15 DIAGNOSIS — Z98.891 HISTORY OF UTERINE SCAR FROM PREVIOUS SURGERY: Chronic | ICD-10-CM

## 2020-01-15 DIAGNOSIS — I10 ESSENTIAL (PRIMARY) HYPERTENSION: ICD-10-CM

## 2020-01-15 PROCEDURE — 99213 OFFICE O/P EST LOW 20 MIN: CPT | Mod: GE

## 2020-01-15 PROCEDURE — G0463: CPT

## 2020-01-15 NOTE — REVIEW OF SYSTEMS
[Fever] : no fever [Chills] : no chills [Pain] : no pain [Vision Problems] : no vision problems [Sore Throat] : no sore throat [Earache] : no earache [Chest Pain] : no chest pain [Lower Ext Edema] : no lower extremity edema [Palpitations] : no palpitations [Paroxysmal Nocturnal Dyspnea] : no paroxysmal nocturnal dyspnea [Orthopnea] : no orthopnea [Wheezing] : no wheezing [Shortness Of Breath] : no shortness of breath [Abdominal Pain] : no abdominal pain [Dyspnea on Exertion] : no dyspnea on exertion [Nausea] : no nausea [Diarrhea] : diarrhea [Constipation] : no constipation [Heartburn] : no heartburn [Vomiting] : no vomiting [Dysuria] : no dysuria [Melena] : no melena [Hematuria] : no hematuria [Mole Changes] : no mole changes [Skin Rash] : no skin rash

## 2020-01-15 NOTE — HISTORY OF PRESENT ILLNESS
[FreeTextEntry1] : f/u visit  [de-identified] : 58 yo F w/ a PMHx hypothyroidism, depression, cervical OA s/p C4-7 anterior cervical discectomy and fusion c/b post-op Oc's Syndrome, early menopause (early 40s) presenting for a CPE.  \par \par Pt recently went to the ED and was dx w/ gastroenteritis.  Now resolved. \par \par Pt also endorsing tension headaches; alleviate w/ Tylenol\par \par HCM: mammogram ordered, gave FIT test again, pap smear up to date, giving flu shot \par Social: non smoker, non drinker, no illicit drug use,

## 2020-01-15 NOTE — ASSESSMENT
[FreeTextEntry1] : 60 yo F w/ a PMHx hypothyroidism, depression, cervical OA s/p C4-7 anterior cervical discectomy and fusion c/b post-op Oc's Syndrome, early menopause (early 40s) presenting for a CPE\par \par # HCM\par - BP slightly elevated; will have pt f/u in 3 months to recheck BP\par - gave another fit test \par - will schedule mammo\par - reviewed CBC, CMP and INR from hospital visit recently; all values normal. A1c 5.5\par - gave flu shot\par \par Christine Lindquist MD\par Internal Medicine, PGY-2

## 2020-01-16 ENCOUNTER — APPOINTMENT (OUTPATIENT)
Dept: SPINE | Facility: CLINIC | Age: 60
End: 2020-01-16
Payer: MEDICAID

## 2020-01-16 VITALS
HEART RATE: 74 BPM | DIASTOLIC BLOOD PRESSURE: 81 MMHG | WEIGHT: 132 LBS | BODY MASS INDEX: 24.29 KG/M2 | TEMPERATURE: 98.3 F | OXYGEN SATURATION: 99 % | SYSTOLIC BLOOD PRESSURE: 129 MMHG | HEIGHT: 62 IN

## 2020-01-16 DIAGNOSIS — F32.9 MAJOR DEPRESSIVE DISORDER, SINGLE EPISODE, UNSPECIFIED: ICD-10-CM

## 2020-01-16 DIAGNOSIS — E03.9 HYPOTHYROIDISM, UNSPECIFIED: ICD-10-CM

## 2020-01-16 PROCEDURE — 99213 OFFICE O/P EST LOW 20 MIN: CPT

## 2020-01-16 RX ORDER — ACETAMINOPHEN 500 MG/1
500 TABLET ORAL
Qty: 180 | Refills: 1 | Status: DISCONTINUED | COMMUNITY
Start: 2018-06-21 | End: 2020-01-16

## 2020-01-16 NOTE — CONSULT LETTER
[Dear  ___] : Dear  [unfilled], [Consult Letter:] : I had the pleasure of evaluating your patient, [unfilled]. [Please see my note below.] : Please see my note below. [Sincerely,] : Sincerely, [Consult Closing:] : Thank you very much for allowing me to participate in the care of this patient.  If you have any questions, please do not hesitate to contact me. [FreeTextEntry3] : MARCELLA Mccoy.\par Nurse Practitioner\par Neurosurgery and Spine Department\par [facility]\par  [FreeTextEntry2] : Selina Barry M.D.\par [address] [facility]\[place] [address]

## 2020-01-16 NOTE — PHYSICAL EXAM
[Well Developed] : well developed [No Acute Distress] : no acute distress [Normal Sclera/Conjunctiva] : normal sclera/conjunctiva [EOMI] : extraocular movements intact [Normal Outer Ear/Nose] : the outer ears and nose were normal in appearance [No JVD] : no jugular venous distention [No Lymphadenopathy] : no lymphadenopathy [Supple] : supple [No Respiratory Distress] : no respiratory distress  [No Accessory Muscle Use] : no accessory muscle use [Clear to Auscultation] : lungs were clear to auscultation bilaterally [Normal Rate] : normal rate  [Regular Rhythm] : with a regular rhythm [Normal S1, S2] : normal S1 and S2 [No Murmur] : no murmur heard [No Edema] : there was no peripheral edema [Soft] : abdomen soft [Non Tender] : non-tender [Non-distended] : non-distended [No Masses] : no abdominal mass palpated [Normal Bowel Sounds] : normal bowel sounds [Person] : oriented to person [Place] : oriented to place [Time] : oriented to time [Short Term Intact] : short term memory intact [Remote Intact] : remote memory intact [Concentration Intact] : normal concentrating ability [Span Intact] : the attention span was normal [Fluency] : fluency intact [Current Events] : adequate knowledge of current events [Comprehension] : comprehension intact [Past History] : adequate knowledge of personal past history [Vocabulary] : adequate range of vocabulary [Cranial Nerves Optic (II)] : visual acuity intact bilaterally,  pupils equal round and reactive to light [Cranial Nerves Facial (VII)] : face symmetrical [Cranial Nerves Trigeminal (V)] : facial sensation intact symmetrically [Cranial Nerves Vestibulocochlear (VIII)] : hearing was intact bilaterally [Cranial Nerves Accessory (XI - Cranial And Spinal)] : head turning and shoulder shrug symmetric [Cranial Nerves Glossopharyngeal (IX)] : tongue and palate midline [Cranial Nerves Hypoglossal (XII)] : there was no tongue deviation with protrusion [Motor Tone] : muscle tone was normal in all four extremities [Motor Strength] : muscle strength was normal in all four extremities [No Muscle Atrophy] : normal bulk in all four extremities [5] : S1 toe walking 5/5 [Sensation Tactile Decrease] : light touch was intact [Abnormal Walk] : normal gait [Balance] : balance was intact [Past-pointing] : there was no past-pointing [Tremor] : no tremor present [3+] : Ankle jerk left 3+ [Plantar Reflex Right Only] : normal on the right [___] : absent on the right [Plantar Reflex Left Only] : normal on the left [___] : absent on the left [Murphy] : Murphy's sign was not demonstrated [FreeTextEntry5] : The left pupil is slightly smaller than the right and is sligthly  slower to react to light.  Her left eyelid has normal strength. [FreeTextEntry1] : The patient has palpable tightness to the right supraclavicular muscle group.

## 2020-01-16 NOTE — HISTORY OF PRESENT ILLNESS
[FreeTextEntry1] : Right-sided neck pain radiating into her right shoulder. [Other: ___] : [unfilled] [de-identified] : Jaylin Chinchilla is a 59-year-old woman who underwent a C4-C5, C5-C6 ,C6-C7 anterior cervical discectomy and fusion on 1/28 /2017 by Dr. Tim Timmons to treat cervical radiculopathy. The patient stated that she had good results from the surgery for at least a year and then she started to have pain and stiffness in the right side her neck with difficulty turning her head to the right side with radiation into her shoulder.  She has difficulty sleeping on her right side. She stated that she is unable to see Dr. Timmons due to insurance issues. She did physical therapy about a year ago with little relief. The patient denies having any epidural injections, chiropractic sore acupuncture. She does not have any new images for review.  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  Her eyelid strength has returned.  She stated that it can be a little weak if she is fatigued.

## 2020-01-16 NOTE — REASON FOR VISIT
[New Patient Visit] : a new patient visit [Referred By: _________] : Patient was referred by NICOLAS [FreeTextEntry1] : Right-sided neck pain radiating into her right shoulder.

## 2020-01-16 NOTE — HISTORY OF PRESENT ILLNESS
[Other: ___] : [unfilled] [FreeTextEntry1] : Right-sided neck pain radiating into her right shoulder. [de-identified] : Jaylin Chinchilla is a 59-year-old woman who underwent a C4-C5, C5-C6 ,C6-C7 anterior cervical discectomy and fusion on 1/28 /2017 by Dr. Tim Timmons to treat cervical radiculopathy. The patient stated that she had good results from the surgery for at least a year and then she started to have pain and stiffness in the right side her neck with difficulty turning her head to the right side with radiation into her shoulder.  She has difficulty sleeping on her right side. She stated that she is unable to see Dr. Timmons due to insurance issues. She did physical therapy about a year ago with little relief. The patient denies having any epidural injections, chiropractic sore acupuncture. She does not have any new images for review.  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  Her eyelid strength has returned.  She stated that it can be a little weak if she is fatigued.

## 2020-01-16 NOTE — REVIEW OF SYSTEMS
[Feeling Tired] : feeling tired [Dizziness] : dizziness [Anxiety] : anxiety [As Noted in HPI] : as noted in HPI [Eyesight Problems] : eyesight problems [Negative] : Heme/Lymph [de-identified] : headaches [FreeTextEntry3] : blurred vision

## 2020-01-16 NOTE — CONSULT LETTER
[Dear  ___] : Dear  [unfilled], [Consult Letter:] : I had the pleasure of evaluating your patient, [unfilled]. [Please see my note below.] : Please see my note below. [Consult Closing:] : Thank you very much for allowing me to participate in the care of this patient.  If you have any questions, please do not hesitate to contact me. [Sincerely,] : Sincerely, [FreeTextEntry2] : Selina Barry M.D.\par [address] [facility]\par [place], [place] [address] [FreeTextEntry3] : MARCELLA Mccoy.\par Nurse Practitioner\par Neurosurgery and Spine Department\par [facility]\par

## 2020-01-16 NOTE — PHYSICAL EXAM
[Well Developed] : well developed [No Acute Distress] : no acute distress [Normal Sclera/Conjunctiva] : normal sclera/conjunctiva [EOMI] : extraocular movements intact [Normal Outer Ear/Nose] : the outer ears and nose were normal in appearance [No JVD] : no jugular venous distention [No Lymphadenopathy] : no lymphadenopathy [Supple] : supple [No Respiratory Distress] : no respiratory distress  [No Accessory Muscle Use] : no accessory muscle use [Clear to Auscultation] : lungs were clear to auscultation bilaterally [Normal Rate] : normal rate  [Regular Rhythm] : with a regular rhythm [Normal S1, S2] : normal S1 and S2 [No Murmur] : no murmur heard [No Edema] : there was no peripheral edema [Soft] : abdomen soft [Non Tender] : non-tender [Non-distended] : non-distended [No Masses] : no abdominal mass palpated [Normal Bowel Sounds] : normal bowel sounds [Person] : oriented to person [Place] : oriented to place [Time] : oriented to time [Short Term Intact] : short term memory intact [Remote Intact] : remote memory intact [Span Intact] : the attention span was normal [Concentration Intact] : normal concentrating ability [Fluency] : fluency intact [Comprehension] : comprehension intact [Current Events] : adequate knowledge of current events [Vocabulary] : adequate range of vocabulary [Past History] : adequate knowledge of personal past history [Cranial Nerves Optic (II)] : visual acuity intact bilaterally,  pupils equal round and reactive to light [Cranial Nerves Facial (VII)] : face symmetrical [Cranial Nerves Trigeminal (V)] : facial sensation intact symmetrically [Cranial Nerves Vestibulocochlear (VIII)] : hearing was intact bilaterally [Cranial Nerves Accessory (XI - Cranial And Spinal)] : head turning and shoulder shrug symmetric [Cranial Nerves Glossopharyngeal (IX)] : tongue and palate midline [Motor Tone] : muscle tone was normal in all four extremities [Cranial Nerves Hypoglossal (XII)] : there was no tongue deviation with protrusion [No Muscle Atrophy] : normal bulk in all four extremities [Motor Strength] : muscle strength was normal in all four extremities [5] : S1 toe walking 5/5 [Abnormal Walk] : normal gait [Sensation Tactile Decrease] : light touch was intact [Balance] : balance was intact [Past-pointing] : there was no past-pointing [Tremor] : no tremor present [3+] : Ankle jerk right 3+ [Plantar Reflex Right Only] : normal on the right [Plantar Reflex Left Only] : normal on the left [___] : absent on the right [___] : absent on the left [Murphy] : Murphy's sign was not demonstrated [FreeTextEntry5] : The left pupil is slightly smaller than the right and is sligthly  slower to react to light.  Her left eyelid has normal strength. [FreeTextEntry1] : The patient has palpable tightness to the right supraclavicular muscle group.

## 2020-01-16 NOTE — ASSESSMENT
[FreeTextEntry1] : Activity levels and proper biomechanics were discussed. The patient was provided with a prescription for physical therapy to treat her right neck and shoulder pain. She was also provided with a prescription for cervical AP and lateral x-rays to the hardware construct alignment and fusion. MRI of the cervical spine was also ordered for any evidence of canal or foraminal impingement. She is to return to the office to see Dr. Rogerio Saldivar for review of the images.

## 2020-01-28 ENCOUNTER — OUTPATIENT (OUTPATIENT)
Dept: OUTPATIENT SERVICES | Facility: HOSPITAL | Age: 60
LOS: 1 days | End: 2020-01-28
Payer: MEDICAID

## 2020-01-28 ENCOUNTER — APPOINTMENT (OUTPATIENT)
Dept: MRI IMAGING | Facility: CLINIC | Age: 60
End: 2020-01-28
Payer: MEDICAID

## 2020-01-28 ENCOUNTER — APPOINTMENT (OUTPATIENT)
Dept: RADIOLOGY | Facility: CLINIC | Age: 60
End: 2020-01-28
Payer: MEDICAID

## 2020-01-28 DIAGNOSIS — Z00.8 ENCOUNTER FOR OTHER GENERAL EXAMINATION: ICD-10-CM

## 2020-01-28 DIAGNOSIS — S09.91XA UNSPECIFIED INJURY OF EAR, INITIAL ENCOUNTER: Chronic | ICD-10-CM

## 2020-01-28 DIAGNOSIS — Z98.891 HISTORY OF UTERINE SCAR FROM PREVIOUS SURGERY: Chronic | ICD-10-CM

## 2020-01-28 PROCEDURE — 72040 X-RAY EXAM NECK SPINE 2-3 VW: CPT | Mod: 26

## 2020-01-28 PROCEDURE — 72141 MRI NECK SPINE W/O DYE: CPT | Mod: 26

## 2020-01-28 PROCEDURE — 72141 MRI NECK SPINE W/O DYE: CPT

## 2020-01-28 PROCEDURE — 72040 X-RAY EXAM NECK SPINE 2-3 VW: CPT

## 2020-02-12 ENCOUNTER — APPOINTMENT (OUTPATIENT)
Dept: SPINE | Facility: CLINIC | Age: 60
End: 2020-02-12

## 2020-02-18 LAB — HBA1C MFR BLD HPLC: 5.5

## 2020-02-26 ENCOUNTER — APPOINTMENT (OUTPATIENT)
Dept: SPINE | Facility: CLINIC | Age: 60
End: 2020-02-26
Payer: MEDICAID

## 2020-02-26 VITALS
HEIGHT: 62 IN | DIASTOLIC BLOOD PRESSURE: 78 MMHG | OXYGEN SATURATION: 100 % | TEMPERATURE: 98 F | BODY MASS INDEX: 24.29 KG/M2 | HEART RATE: 65 BPM | SYSTOLIC BLOOD PRESSURE: 129 MMHG | WEIGHT: 132 LBS

## 2020-02-26 DIAGNOSIS — M54.2 CERVICALGIA: ICD-10-CM

## 2020-02-26 PROCEDURE — 99213 OFFICE O/P EST LOW 20 MIN: CPT

## 2020-05-18 ENCOUNTER — APPOINTMENT (OUTPATIENT)
Dept: INTERNAL MEDICINE | Facility: CLINIC | Age: 60
End: 2020-05-18

## 2020-05-20 ENCOUNTER — OUTPATIENT (OUTPATIENT)
Dept: OUTPATIENT SERVICES | Facility: HOSPITAL | Age: 60
LOS: 1 days | End: 2020-05-20
Payer: MEDICAID

## 2020-05-20 ENCOUNTER — APPOINTMENT (OUTPATIENT)
Dept: INTERNAL MEDICINE | Facility: CLINIC | Age: 60
End: 2020-05-20

## 2020-05-20 DIAGNOSIS — Z98.891 HISTORY OF UTERINE SCAR FROM PREVIOUS SURGERY: Chronic | ICD-10-CM

## 2020-05-20 DIAGNOSIS — S09.91XA UNSPECIFIED INJURY OF EAR, INITIAL ENCOUNTER: Chronic | ICD-10-CM

## 2020-05-20 DIAGNOSIS — I10 ESSENTIAL (PRIMARY) HYPERTENSION: ICD-10-CM

## 2020-05-20 DIAGNOSIS — M54.2 CERVICALGIA: ICD-10-CM

## 2020-05-20 DIAGNOSIS — E03.9 HYPOTHYROIDISM, UNSPECIFIED: ICD-10-CM

## 2020-05-20 PROCEDURE — G0463: CPT

## 2020-07-07 ENCOUNTER — OUTPATIENT (OUTPATIENT)
Dept: OUTPATIENT SERVICES | Facility: HOSPITAL | Age: 60
LOS: 1 days | End: 2020-07-07
Payer: MEDICAID

## 2020-07-07 ENCOUNTER — RESULT REVIEW (OUTPATIENT)
Age: 60
End: 2020-07-07

## 2020-07-07 ENCOUNTER — APPOINTMENT (OUTPATIENT)
Dept: MAMMOGRAPHY | Facility: CLINIC | Age: 60
End: 2020-07-07
Payer: MEDICAID

## 2020-07-07 DIAGNOSIS — Z98.891 HISTORY OF UTERINE SCAR FROM PREVIOUS SURGERY: Chronic | ICD-10-CM

## 2020-07-07 DIAGNOSIS — Z12.31 ENCOUNTER FOR SCREENING MAMMOGRAM FOR MALIGNANT NEOPLASM OF BREAST: ICD-10-CM

## 2020-07-07 DIAGNOSIS — S09.91XA UNSPECIFIED INJURY OF EAR, INITIAL ENCOUNTER: Chronic | ICD-10-CM

## 2020-07-07 PROCEDURE — 77067 SCR MAMMO BI INCL CAD: CPT

## 2020-07-07 PROCEDURE — 77063 BREAST TOMOSYNTHESIS BI: CPT

## 2020-07-07 PROCEDURE — 77067 SCR MAMMO BI INCL CAD: CPT | Mod: 26

## 2020-07-07 PROCEDURE — 77063 BREAST TOMOSYNTHESIS BI: CPT | Mod: 26

## 2020-08-03 ENCOUNTER — OUTPATIENT (OUTPATIENT)
Dept: OUTPATIENT SERVICES | Facility: HOSPITAL | Age: 60
LOS: 1 days | End: 2020-08-03
Payer: MEDICAID

## 2020-08-03 ENCOUNTER — APPOINTMENT (OUTPATIENT)
Dept: INTERNAL MEDICINE | Facility: CLINIC | Age: 60
End: 2020-08-03
Payer: MEDICAID

## 2020-08-03 VITALS
WEIGHT: 147 LBS | HEIGHT: 62 IN | DIASTOLIC BLOOD PRESSURE: 80 MMHG | SYSTOLIC BLOOD PRESSURE: 140 MMHG | BODY MASS INDEX: 27.05 KG/M2

## 2020-08-03 DIAGNOSIS — R00.2 PALPITATIONS: ICD-10-CM

## 2020-08-03 DIAGNOSIS — Z98.891 HISTORY OF UTERINE SCAR FROM PREVIOUS SURGERY: Chronic | ICD-10-CM

## 2020-08-03 DIAGNOSIS — S09.91XA UNSPECIFIED INJURY OF EAR, INITIAL ENCOUNTER: Chronic | ICD-10-CM

## 2020-08-03 DIAGNOSIS — I10 ESSENTIAL (PRIMARY) HYPERTENSION: ICD-10-CM

## 2020-08-03 DIAGNOSIS — R42 DIZZINESS AND GIDDINESS: ICD-10-CM

## 2020-08-03 LAB
ALBUMIN SERPL ELPH-MCNC: 4.8 G/DL
ALP BLD-CCNC: 81 U/L
ALT SERPL-CCNC: 22 U/L
ANION GAP SERPL CALC-SCNC: 14 MMOL/L
AST SERPL-CCNC: 18 U/L
BASOPHILS # BLD AUTO: 0.06 K/UL
BASOPHILS NFR BLD AUTO: 1.5 %
BILIRUB SERPL-MCNC: 0.2 MG/DL
BUN SERPL-MCNC: 12 MG/DL
CALCIUM SERPL-MCNC: 10.2 MG/DL
CHLORIDE SERPL-SCNC: 106 MMOL/L
CHOLEST SERPL-MCNC: 239 MG/DL
CHOLEST/HDLC SERPL: 4.6 RATIO
CO2 SERPL-SCNC: 24 MMOL/L
CREAT SERPL-MCNC: 0.71 MG/DL
EOSINOPHIL # BLD AUTO: 0.25 K/UL
EOSINOPHIL NFR BLD AUTO: 6.4 %
GLUCOSE SERPL-MCNC: 81 MG/DL
HCT VFR BLD CALC: 37.8 %
HDLC SERPL-MCNC: 52 MG/DL
HGB BLD-MCNC: 12.1 G/DL
IMM GRANULOCYTES NFR BLD AUTO: 0.3 %
LDLC SERPL CALC-MCNC: 145 MG/DL
LYMPHOCYTES # BLD AUTO: 1.35 K/UL
LYMPHOCYTES NFR BLD AUTO: 34.4 %
MAN DIFF?: NORMAL
MCHC RBC-ENTMCNC: 30 PG
MCHC RBC-ENTMCNC: 32 GM/DL
MCV RBC AUTO: 93.8 FL
MONOCYTES # BLD AUTO: 0.29 K/UL
MONOCYTES NFR BLD AUTO: 7.4 %
NEUTROPHILS # BLD AUTO: 1.97 K/UL
NEUTROPHILS NFR BLD AUTO: 50 %
PLATELET # BLD AUTO: 284 K/UL
POTASSIUM SERPL-SCNC: 4.1 MMOL/L
PROT SERPL-MCNC: 7.3 G/DL
RBC # BLD: 4.03 M/UL
RBC # FLD: 13.3 %
SODIUM SERPL-SCNC: 144 MMOL/L
TRIGL SERPL-MCNC: 210 MG/DL
TSH SERPL-ACNC: 0.59 UIU/ML
WBC # FLD AUTO: 3.93 K/UL

## 2020-08-03 PROCEDURE — 99213 OFFICE O/P EST LOW 20 MIN: CPT | Mod: GE

## 2020-08-03 PROCEDURE — G0463: CPT

## 2020-08-04 NOTE — ASSESSMENT
[FreeTextEntry1] : Previous labwork discussed with patient \par RTC PRN\par \par case d/w Dr. Lopez\par \par Carolinavazquez Cardoso, PGY-2\par Internal Medicine

## 2020-08-04 NOTE — PHYSICAL EXAM
[No CVA Tenderness] : no CVA  tenderness [Normal] : normal gait, coordination grossly intact, no focal deficits and deep tendon reflexes were 2+ and symmetric [de-identified] : tenderness in c-spine, pain with neck ROM, unable to elicit dizziness [de-identified] : nervous affect

## 2020-08-04 NOTE — HISTORY OF PRESENT ILLNESS
[FreeTextEntry8] : 60 yo F w/ a PMHx hypothyroidism, anxiety, depression, cervical OA s/p C4-7 anterior cervical discectomy and fusion c/b post-op Oc's Syndrome, early menopause (early 40s) presenting for acute visit for concern of dizziness. \par \par Approx 1 week ago, patient was doing laundry when she experienced an episode of dizziness and what she describes as "weaker vision" and required help to sit down. Her symptoms were associated with a R sided headache that she described as electric in sensation. Patient denies fainting or LOC. She reports that an event like this has never happened before. She does note a hx of positional vertigo that improved with Epley maneuvers in the past however states that this episode felt different. She denies having ever  fainted in the past, and states that her dizziness is not "room-spinning" kind of dizziness. Patient denies unilateral weakness, changes in sensation, or speech changes. Patient denies personal hx of migraines however reports that her mother had migraines. She has a strong history of anxiety and depression and has occasional panic attacks and very short episodes of palpitations associated with these episodes. Yesterday she felt a brief kelsea of chest pain which resolved immediately-though because she has a family hx of CAD/MI, she took an ASA 81. She has difficulty sleeping and finds that she has these panic symptoms/palpitations more so when she is tired. Patient has been seen by Cardiology in the past however because of the pandemic, she wishes to transfer care to a [facility] Cardiologist and referral was given to patient. Explained to patient that her current symptoms seem more in line with post-op pain and that she should f/u with NSGY and pain management (referral given).

## 2020-08-04 NOTE — REVIEW OF SYSTEMS
[Headache] : headache [Palpitations] : palpitations [Dizziness] : dizziness [Unsteady Walking] : ataxia [Depression] : depression [Anxiety] : anxiety [Negative] : Gastrointestinal [Lower Ext Edema] : no lower extremity edema [Fainting] : no fainting [Chest Pain] : no chest pain [Memory Loss] : no memory loss [Confusion] : no confusion

## 2020-08-11 ENCOUNTER — APPOINTMENT (OUTPATIENT)
Dept: CARDIOLOGY | Facility: CLINIC | Age: 60
End: 2020-08-11
Payer: MEDICAID

## 2020-08-11 ENCOUNTER — NON-APPOINTMENT (OUTPATIENT)
Age: 60
End: 2020-08-11

## 2020-08-11 VITALS
OXYGEN SATURATION: 99 % | DIASTOLIC BLOOD PRESSURE: 84 MMHG | SYSTOLIC BLOOD PRESSURE: 131 MMHG | BODY MASS INDEX: 26.7 KG/M2 | HEART RATE: 90 BPM | WEIGHT: 146 LBS

## 2020-08-11 DIAGNOSIS — R76.8 OTHER SPECIFIED ABNORMAL IMMUNOLOGICAL FINDINGS IN SERUM: ICD-10-CM

## 2020-08-11 PROCEDURE — 93000 ELECTROCARDIOGRAM COMPLETE: CPT

## 2020-08-11 PROCEDURE — 99204 OFFICE O/P NEW MOD 45 MIN: CPT

## 2020-08-11 NOTE — DISCUSSION/SUMMARY
[FreeTextEntry1] : The patient is a 59-year-old female hypothyroid, cervical spine disease with chest pain, fatigue and dyspnea on exertion. \par #1 CV- echo and exercise stress test once neck pain resolved\par #2 C-spine- has appointment with her surgeon, ibuprofen prn pain for now\par #3 General- Weight gain most likely responsible for symptoms.

## 2020-08-11 NOTE — PHYSICAL EXAM
[General Appearance - Well Developed] : well developed [Normal Appearance] : normal appearance [Well Groomed] : well groomed [General Appearance - Well Nourished] : well nourished [No Deformities] : no deformities [Normal Conjunctiva] : the conjunctiva exhibited no abnormalities [General Appearance - In No Acute Distress] : no acute distress [Eyelids - No Xanthelasma] : the eyelids demonstrated no xanthelasmas [Normal Oral Mucosa] : normal oral mucosa [No Oral Cyanosis] : no oral cyanosis [No Oral Pallor] : no oral pallor [Normal Jugular Venous V Waves Present] : normal jugular venous V waves present [Normal Jugular Venous A Waves Present] : normal jugular venous A waves present [Heart Rate And Rhythm] : heart rate and rhythm were normal [No Jugular Venous [place] A Waves] : no jugular venous licona A waves [Heart Sounds] : normal S1 and S2 [Respiration, Rhythm And Depth] : normal respiratory rhythm and effort [Murmurs] : no murmurs present [Auscultation Breath Sounds / Voice Sounds] : lungs were clear to auscultation bilaterally [Exaggerated Use Of Accessory Muscles For Inspiration] : no accessory muscle use [Abdomen Tenderness] : non-tender [Abdomen Soft] : soft [Abnormal Walk] : normal gait [Abdomen Mass (___ Cm)] : no abdominal mass palpated [Gait - Sufficient For Exercise Testing] : the gait was sufficient for exercise testing [Nail Clubbing] : no clubbing of the fingernails [Cyanosis, Localized] : no localized cyanosis [Petechial Hemorrhages (___cm)] : no petechial hemorrhages [Skin Color & Pigmentation] : normal skin color and pigmentation [] : no rash [No Skin Ulcers] : no skin ulcer [No Venous Stasis] : no venous stasis [Skin Lesions] : no skin lesions [No Xanthoma] : no  xanthoma was observed [Oriented To Time, Place, And Person] : oriented to person, place, and time [Affect] : the affect was normal [No Anxiety] : not feeling anxious [Mood] : the mood was normal

## 2020-08-11 NOTE — HISTORY OF PRESENT ILLNESS
[FreeTextEntry1] : Jaylin is a 59-year-old female hypothyroidism who presents with chest pain, shortness of breath and fatigue. She had spinal fusion and now having a lot of pain in the cervical spine and not sure if that is causing her pain. She has gained weight during COVID.

## 2020-08-11 NOTE — REASON FOR VISIT
[Initial Evaluation] : an initial evaluation of [Chest Pain] : chest pain [Dyspnea] : dyspnea [Fatigue] : feeling tired (fatigue)

## 2020-08-11 NOTE — REVIEW OF SYSTEMS
[Palpitations] : palpitations [Negative] : Heme/Lymph [Shortness Of Breath] : no shortness of breath [Dyspnea on exertion] : not dyspnea during exertion [Chest Pain] : no chest pain

## 2020-08-25 ENCOUNTER — APPOINTMENT (OUTPATIENT)
Dept: NEUROSURGERY | Facility: CLINIC | Age: 60
End: 2020-08-25
Payer: MEDICAID

## 2020-08-25 VITALS
HEART RATE: 90 BPM | WEIGHT: 146 LBS | BODY MASS INDEX: 26.87 KG/M2 | SYSTOLIC BLOOD PRESSURE: 134 MMHG | OXYGEN SATURATION: 99 % | HEIGHT: 62 IN | DIASTOLIC BLOOD PRESSURE: 91 MMHG | RESPIRATION RATE: 16 BRPM

## 2020-08-25 VITALS — TEMPERATURE: 98 F

## 2020-08-25 PROCEDURE — 99213 OFFICE O/P EST LOW 20 MIN: CPT

## 2020-09-09 ENCOUNTER — APPOINTMENT (OUTPATIENT)
Dept: RADIOLOGY | Facility: CLINIC | Age: 60
End: 2020-09-09

## 2020-09-14 NOTE — PHYSICAL EXAM
[General Appearance - Alert] : alert [General Appearance - Well Nourished] : well nourished [Well-Healed] : well-healed [General Appearance - In No Acute Distress] : in no acute distress [General Appearance - Well Developed] : well developed [Normal Skin] : normal [No Drainage] : without drainage [Oriented To Time, Place, And Person] : oriented to person, place, and time [Impaired Insight] : insight and judgment were intact [Cranial Nerves Trigeminal (V)] : facial sensation intact symmetrically [Cranial Nerves Oculomotor (III)] : extraocular motion intact [Cranial Nerves Facial (VII)] : face symmetrical [Cranial Nerves Optic (II)] : visual acuity intact bilaterally,  pupils equal round and reactive to light [Cranial Nerves Vestibulocochlear (VIII)] : hearing was intact bilaterally [Cranial Nerves Glossopharyngeal (IX)] : tongue and palate midline [Motor Strength] : muscle strength was normal in all four extremities [Cranial Nerves Accessory (XI - Cranial And Spinal)] : head turning and shoulder shrug symmetric [Cranial Nerves Hypoglossal (XII)] : there was no tongue deviation with protrusion [Sensation Tactile Decrease] : light touch was intact [Balance] : balance was intact [Normal] : normal [Intact] : all motor groups within normal limits of strength and tone bilaterally [PERRL With Normal Accommodation] : pupils were equal in size, round, reactive to light, with normal accommodation [Hearing Threshold Finger Rub Not [place]] : hearing was normal [] : no respiratory distress [Neck Appearance] : the appearance of the neck was normal [Respiration, Rhythm And Depth] : normal respiratory rhythm and effort [Abnormal Walk] : normal gait [Edema] : there was no peripheral edema [Skin Color & Pigmentation] : normal skin color and pigmentation [Motor Tone] : muscle strength and tone were normal [Involuntary Movements] : no involuntary movements were seen [Limited Balance] : balance was intact [Romberg's Sign] : Romberg's sign was negtive [Tremor] : no tremor present [FreeTextEntry1] :  L

## 2020-09-14 NOTE — HISTORY OF PRESENT ILLNESS
[FreeTextEntry1] : Mrs. Chinchilla is a 60 yo woman s/p 1/28/17 ACDF C4-C7 who complains of chronic right upper traps, right upper back and radiating to her right side of the head for years.  MRI and xrays obtained and reviewed with no pathological findings.  She was referred to pain management taking tylenol, advil, and ambien with partial relief.  Last episode about 1 week ago lasting most of day.  \par Post op left Oc's has resolved with ophthalmology following.\par \par Cervical - same, intermittent discomfort of right upper traps with pain free cervical range of motion.  MRI shows spondylosis of C5-C7.\par Back - she reports same chronic midline back pain radiating from neck to thoracic region.  MRI shows L5-S1 grade 1 spondylosis.\par Shoulder - she c/o pain in right shoulder exacerbated with shoulder flex/abduction.  Xrays show no fracture, dislocation, or lesion had f/u with ortho.\par \par She also suffers from chronic long term migraines with burning sensation in head followed by Dr. Ashley.\par \par She denies headaches, nausea, vomiting, weakness, or fever.  She denies chest pain, palpitations, shortness of breath, visual, hearing, or speech disturbances.

## 2020-09-14 NOTE — ASSESSMENT
[FreeTextEntry1] : Xrays to evaluate alignment and stability, referral to pain management for possible injections, PT, and return in 1 month to reassess.\par \par 1)  Xrays Thoracic flex/ext - now / anytime prior to f/u in 1 month\par 2)  Pain management referral - Dr. Jimenez\par 3)  PT referral\par 4)  RTO 1 month

## 2020-09-14 NOTE — REVIEW OF SYSTEMS
[Negative] : Endocrine [Migraine Headache] : migraine headaches [As Noted in HPI] : as noted in HPI [Abdominal Pain] : no abdominal pain [Vomiting] : no vomiting [Diarrhea] : no diarrhea [Dysuria] : no dysuria [Incontinence] : no incontinence [Skin Lesions] : no skin lesions [Skin Wound] : no skin wound [Itching] : no itching [Easy Bleeding] : no tendency for easy bleeding [Easy Bruising] : no tendency for easy bruising [FreeTextEntry3] : PINKY Grey resolved

## 2020-09-21 ENCOUNTER — OUTPATIENT (OUTPATIENT)
Dept: OUTPATIENT SERVICES | Facility: HOSPITAL | Age: 60
LOS: 1 days | End: 2020-09-21
Payer: MEDICAID

## 2020-09-21 ENCOUNTER — APPOINTMENT (OUTPATIENT)
Dept: RADIOLOGY | Facility: CLINIC | Age: 60
End: 2020-09-21
Payer: MEDICAID

## 2020-09-21 DIAGNOSIS — Z98.1 ARTHRODESIS STATUS: ICD-10-CM

## 2020-09-21 DIAGNOSIS — M54.9 DORSALGIA, UNSPECIFIED: ICD-10-CM

## 2020-09-21 DIAGNOSIS — S09.91XA UNSPECIFIED INJURY OF EAR, INITIAL ENCOUNTER: Chronic | ICD-10-CM

## 2020-09-21 DIAGNOSIS — Z98.891 HISTORY OF UTERINE SCAR FROM PREVIOUS SURGERY: Chronic | ICD-10-CM

## 2020-09-21 PROCEDURE — 72040 X-RAY EXAM NECK SPINE 2-3 VW: CPT

## 2020-09-21 PROCEDURE — 72040 X-RAY EXAM NECK SPINE 2-3 VW: CPT | Mod: 26

## 2020-09-23 ENCOUNTER — APPOINTMENT (OUTPATIENT)
Dept: CV DIAGNOSTICS | Facility: HOSPITAL | Age: 60
End: 2020-09-23

## 2020-09-23 ENCOUNTER — APPOINTMENT (OUTPATIENT)
Dept: CV DIAGNOSITCS | Facility: HOSPITAL | Age: 60
End: 2020-09-23

## 2020-09-29 ENCOUNTER — APPOINTMENT (OUTPATIENT)
Dept: NEUROSURGERY | Facility: CLINIC | Age: 60
End: 2020-09-29
Payer: MEDICAID

## 2020-09-29 ENCOUNTER — RESULT REVIEW (OUTPATIENT)
Age: 60
End: 2020-09-29

## 2020-09-29 ENCOUNTER — OUTPATIENT (OUTPATIENT)
Dept: OUTPATIENT SERVICES | Facility: HOSPITAL | Age: 60
LOS: 1 days | End: 2020-09-29
Payer: MEDICAID

## 2020-09-29 ENCOUNTER — APPOINTMENT (OUTPATIENT)
Dept: RADIOLOGY | Facility: CLINIC | Age: 60
End: 2020-09-29
Payer: MEDICAID

## 2020-09-29 VITALS — TEMPERATURE: 97.2 F

## 2020-09-29 VITALS
RESPIRATION RATE: 15 BRPM | HEART RATE: 76 BPM | BODY MASS INDEX: 26.87 KG/M2 | SYSTOLIC BLOOD PRESSURE: 133 MMHG | OXYGEN SATURATION: 100 % | HEIGHT: 62 IN | WEIGHT: 146 LBS | DIASTOLIC BLOOD PRESSURE: 81 MMHG

## 2020-09-29 DIAGNOSIS — Z98.891 HISTORY OF UTERINE SCAR FROM PREVIOUS SURGERY: Chronic | ICD-10-CM

## 2020-09-29 DIAGNOSIS — S09.91XA UNSPECIFIED INJURY OF EAR, INITIAL ENCOUNTER: Chronic | ICD-10-CM

## 2020-09-29 DIAGNOSIS — Z00.8 ENCOUNTER FOR OTHER GENERAL EXAMINATION: ICD-10-CM

## 2020-09-29 PROCEDURE — 72074 X-RAY EXAM THORAC SPINE4/>VW: CPT | Mod: 26

## 2020-09-29 PROCEDURE — 99213 OFFICE O/P EST LOW 20 MIN: CPT

## 2020-09-29 PROCEDURE — 72074 X-RAY EXAM THORAC SPINE4/>VW: CPT

## 2020-10-08 NOTE — PHYSICAL EXAM
[General Appearance - Alert] : alert [General Appearance - In No Acute Distress] : in no acute distress [Oriented To Time, Place, And Person] : oriented to person, place, and time [Impaired Insight] : insight and judgment were intact [Balance] : balance was intact [Normal] : normal [Intact] : all reflexes within normal limits bilaterally [Hearing Threshold Finger Rub Not [place]] : hearing was normal [Neck Appearance] : the appearance of the neck was normal [] : no respiratory distress [Respiration, Rhythm And Depth] : normal respiratory rhythm and effort [Edema] : there was no peripheral edema [Abnormal Walk] : normal gait [Involuntary Movements] : no involuntary movements were seen [Motor Tone] : muscle strength and tone were normal [Skin Color & Pigmentation] : normal skin color and pigmentation [Able to toe walk] : the patient was able to toe walk [Able to heel walk] : the patient was able to heel walk [Sclera] : the sclera and conjunctiva were normal [PERRL With Normal Accommodation] : pupils were equal in size, round, reactive to light, with normal accommodation [Tremor] : no tremor present

## 2020-10-08 NOTE — HISTORY OF PRESENT ILLNESS
[FreeTextEntry1] : Mrs. Chinchilla is a 58 yo woman s/p 1/28/17 ACDF C4-C7 who complains of chronic right upper traps, right upper back and radiating to her right side of the head for years who arrives for xray review and f/u of chronic pain.   \par \par MRI and xrays obtained and reviewed with no pathological findings.  She was referred to pain management taking tylenol, advil, and ambien with partial relief.  Last episode about 1 week ago lasting most of day.  \par Post op left Oc's has resolved with ophthalmology following.\par \par Cervical - same, intermittent discomfort of right upper traps with pain free cervical range of motion.  MRI shows spondylosis of C5-C7.\par Back - she reports same chronic midline back pain radiating from neck to thoracic region.  MRI shows L5-S1 grade 1 spondylosis.\par Shoulder - she c/o pain in right shoulder exacerbated with shoulder flex/abduction.  Xrays show no fracture, dislocation, or lesion had f/u with ortho.\par \par She also suffers from chronic long term migraines with burning sensation in head followed by Dr. Ashley.\par \par She denies headaches, nausea, vomiting, weakness, or fever.  She denies chest pain, palpitations, shortness of breath, visual, hearing, or speech disturbances.

## 2020-10-08 NOTE — ASSESSMENT
[FreeTextEntry1] : Xrays reviewed alignment and stability normal.  Starting pain management with Dr. Jimenez's office Dr. Thomas for possible injections and may go to PT if she decides to.  \par \par f/u PRN

## 2020-10-08 NOTE — REVIEW OF SYSTEMS
[Migraine Headache] : migraine headaches [As Noted in HPI] : as noted in HPI [Negative] : Endocrine [Abdominal Pain] : no abdominal pain [Vomiting] : no vomiting [Diarrhea] : no diarrhea [Dysuria] : no dysuria [Incontinence] : no incontinence [Skin Lesions] : no skin lesions [Skin Wound] : no skin wound [Itching] : no itching [Easy Bleeding] : no tendency for easy bleeding [Easy Bruising] : no tendency for easy bruising [FreeTextEntry3] : PINKY Grey resolved

## 2020-10-21 ENCOUNTER — APPOINTMENT (OUTPATIENT)
Dept: CV DIAGNOSTICS | Facility: HOSPITAL | Age: 60
End: 2020-10-21

## 2020-10-21 ENCOUNTER — OUTPATIENT (OUTPATIENT)
Dept: OUTPATIENT SERVICES | Facility: HOSPITAL | Age: 60
LOS: 1 days | End: 2020-10-21
Payer: MEDICAID

## 2020-10-21 ENCOUNTER — APPOINTMENT (OUTPATIENT)
Dept: CV DIAGNOSITCS | Facility: HOSPITAL | Age: 60
End: 2020-10-21

## 2020-10-21 DIAGNOSIS — S09.91XA UNSPECIFIED INJURY OF EAR, INITIAL ENCOUNTER: Chronic | ICD-10-CM

## 2020-10-21 DIAGNOSIS — Z98.891 HISTORY OF UTERINE SCAR FROM PREVIOUS SURGERY: Chronic | ICD-10-CM

## 2020-10-21 DIAGNOSIS — R06.02 SHORTNESS OF BREATH: ICD-10-CM

## 2020-10-21 PROCEDURE — 93018 CV STRESS TEST I&R ONLY: CPT

## 2020-10-21 PROCEDURE — 93016 CV STRESS TEST SUPVJ ONLY: CPT

## 2020-10-21 PROCEDURE — 93306 TTE W/DOPPLER COMPLETE: CPT | Mod: 26

## 2020-10-21 PROCEDURE — 93017 CV STRESS TEST TRACING ONLY: CPT

## 2020-10-21 PROCEDURE — 93306 TTE W/DOPPLER COMPLETE: CPT

## 2020-10-22 NOTE — ED ADULT NURSE NOTE - NS ED NURSE RECORD ANOTHER VITAL SIGN
Please call patient with normal results. This is both a routinely recommended test previously in baby boomers for those born between 80[birth year]46 and those with risk factors or elevated liver enzymes. It is now recommended to screen everyone 18-65 and all pregnant women in efforts to at least eradicate hepatitis C in the [address] [place] [address] [address]. See OV for additional recommendations. Yes

## 2020-11-03 ENCOUNTER — APPOINTMENT (OUTPATIENT)
Dept: CARDIOLOGY | Facility: CLINIC | Age: 60
End: 2020-11-03

## 2020-11-12 ENCOUNTER — RESULT CHARGE (OUTPATIENT)
Age: 60
End: 2020-11-12

## 2020-11-13 ENCOUNTER — OUTPATIENT (OUTPATIENT)
Dept: OUTPATIENT SERVICES | Facility: HOSPITAL | Age: 60
LOS: 1 days | End: 2020-11-13
Payer: MEDICAID

## 2020-11-13 ENCOUNTER — APPOINTMENT (OUTPATIENT)
Dept: INTERNAL MEDICINE | Facility: CLINIC | Age: 60
End: 2020-11-13
Payer: MEDICAID

## 2020-11-13 ENCOUNTER — MED ADMIN CHARGE (OUTPATIENT)
Age: 60
End: 2020-11-13

## 2020-11-13 ENCOUNTER — NON-APPOINTMENT (OUTPATIENT)
Age: 60
End: 2020-11-13

## 2020-11-13 VITALS
WEIGHT: 149 LBS | SYSTOLIC BLOOD PRESSURE: 130 MMHG | HEIGHT: 62 IN | BODY MASS INDEX: 27.42 KG/M2 | DIASTOLIC BLOOD PRESSURE: 70 MMHG

## 2020-11-13 DIAGNOSIS — Z98.891 HISTORY OF UTERINE SCAR FROM PREVIOUS SURGERY: Chronic | ICD-10-CM

## 2020-11-13 DIAGNOSIS — M19.90 UNSPECIFIED OSTEOARTHRITIS, UNSPECIFIED SITE: ICD-10-CM

## 2020-11-13 DIAGNOSIS — I10 ESSENTIAL (PRIMARY) HYPERTENSION: ICD-10-CM

## 2020-11-13 DIAGNOSIS — M79.89 OTHER SPECIFIED SOFT TISSUE DISORDERS: ICD-10-CM

## 2020-11-13 DIAGNOSIS — H53.8 OTHER VISUAL DISTURBANCES: ICD-10-CM

## 2020-11-13 DIAGNOSIS — S09.91XA UNSPECIFIED INJURY OF EAR, INITIAL ENCOUNTER: Chronic | ICD-10-CM

## 2020-11-13 PROCEDURE — G0463: CPT

## 2020-11-13 PROCEDURE — 99213 OFFICE O/P EST LOW 20 MIN: CPT | Mod: GE

## 2020-11-16 ENCOUNTER — NON-APPOINTMENT (OUTPATIENT)
Age: 60
End: 2020-11-16

## 2020-11-16 NOTE — PHYSICAL EXAM
[No Acute Distress] : no acute distress [Well Nourished] : well nourished [Well Developed] : well developed [Well-Appearing] : well-appearing [Normal Voice/Communication] : normal voice/communication [Normal Sclera/Conjunctiva] : normal sclera/conjunctiva [EOMI] : extraocular movements intact [Normal Outer Ear/Nose] : the outer ears and nose were normal in appearance [No JVD] : no jugular venous distention [Supple] : supple [No Respiratory Distress] : no respiratory distress  [No Accessory Muscle Use] : no accessory muscle use [Clear to Auscultation] : lungs were clear to auscultation bilaterally [Normal Rate] : normal rate  [Regular Rhythm] : with a regular rhythm [Normal S1, S2] : normal S1 and S2 [No Murmur] : no murmur heard [No Varicosities] : no varicosities [Pedal Pulses Present] : the pedal pulses are present [No Edema] : there was no peripheral edema [Soft] : abdomen soft [Non Tender] : non-tender [Non-distended] : non-distended [No Masses] : no abdominal mass palpated [No HSM] : no HSM [Normal Bowel Sounds] : normal bowel sounds [No Spinal Tenderness] : no spinal tenderness [No Joint Swelling] : no joint swelling [Grossly Normal Strength/Tone] : grossly normal strength/tone [No Rash] : no rash [Coordination Grossly Intact] : coordination grossly intact [No Focal Deficits] : no focal deficits [Normal Gait] : normal gait [Speech Grossly Normal] : speech grossly normal [Memory Grossly Normal] : memory grossly normal [Normal Affect] : the affect was normal [Alert and Oriented x3] : oriented to person, place, and time [Normal Mood] : the mood was normal [Normal Insight/Judgement] : insight and judgment were intact [de-identified] : except for 4/5 strength on b/l knee flexion, no joint tenderness or erythema or warmth, b/l hands all DIP joints with swelling/nodes and tenderness. Most fingers not completely straight. Full ROM of all joints/extremities. Pain on neck extension, chronic. B/l knee mild swelling, nonerythematous, no warmth, nontender. [de-identified] : negative Romberg test, head atraumatic.  [de-identified] : appeared anxious

## 2020-11-16 NOTE — END OF VISIT
[] : Resident [FreeTextEntry3] : 2 falls recently, unexplained  as she had no complaint. Meds as listed, followed by neuro, psychiatry and cardio. Echo and uclear EST negative.\par Of note patient reports taking the diazepam twice a day with falls possibly precipitated after she took the diazepam.\par Of note patient also had a negative stress test and echocardiogram October 21, 2020. \par She is also being followed by neurology for chronic pain.  Patient made aware to speak to psychiatry to reevaluate her medications Patient made aware also to follow-up with neurology and cardiology.\par Nonfocal exam, cerebellar testing normal.

## 2020-11-16 NOTE — HISTORY OF PRESENT ILLNESS
[FreeTextEntry8] : CC: Falls, worsening finger swelling\par \par 59 yo F with hx hypothyroidism, anxiety, depression, cervical osteoarthritis s/p C4-7 anterior cervical discectomy and fusion c/b post-op Oc's Syndrome (2017) with cervical radiculopathy, hx prediabetes (resolved, HbA1c 5.5% 1/2020), hx vertigo and early menopause (early 40s) who presents as acute visit c/o falls and worsening finger swelling.\par \par # Falls\par - Reports 2 episodes of falls within last 2 months. \par - Oct 2020: was walking, when sudden fell, face forward, hit both knees with minor resultant abrasions on knee, did not strike any other areas of body, did not trip on anything/does not appear mechanical in etiology. Felt fine prior to incident: no dizziness or lightheadedness, no LOC.\par - This past Monday: when exiting car, fell, sudden onset, did not trip, felt fine prior to incident: no dizziness, no lightheadedness, no LOC. \par - Pt reports chronic R low back pain, since 2017, worsening, but was not particularly worse prior to fall episodes; reported intermittent L foot numbness, otherwise no numbness now, no appreciable change in gait, no urinary incontinence.\par - Denied muscle weakness or pain.\par - No radicular type pain: no pain shooting down leg.\par - No change in sensation.\par - Reports blurry vision: chronic, saw opthalmology early this year and was supposed to get glasses but never got the prescription filled. Blurry vision with reading and far vision.\par - Taking diazepam per psychiatrist, twice per day, for anxiety.\par \par # B/l finger swelling and pain\par - onset years, unsure when. Swelling getting worse. Fingers not straight. Fingers tender. Fingers stiff. Not red. No associated arthralgia or rash.\par - taking OTC Tylenol & Advil once to twice a day, not helping much.\par \par ROS neg for f/c, wt loss, n/v/d, abd pain, CP, SOB, rash, arthralgia.

## 2020-11-16 NOTE — COUNSELING
[Fall prevention counseling provided] : Fall prevention counseling provided [Use proper foot wear] : Use proper foot wear [Behavioral health counseling provided] : Behavioral health counseling provided [Engage in a relaxing activity] : Engage in a relaxing activity [FreeTextEntry1] : proper body positioning for reaching during tasks
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
## 2020-11-18 DIAGNOSIS — H53.8 OTHER VISUAL DISTURBANCES: ICD-10-CM

## 2020-11-18 DIAGNOSIS — E03.9 HYPOTHYROIDISM, UNSPECIFIED: ICD-10-CM

## 2020-11-18 DIAGNOSIS — M25.561 PAIN IN RIGHT KNEE: ICD-10-CM

## 2020-11-18 DIAGNOSIS — M19.90 UNSPECIFIED OSTEOARTHRITIS, UNSPECIFIED SITE: ICD-10-CM

## 2020-11-18 DIAGNOSIS — M79.89 OTHER SPECIFIED SOFT TISSUE DISORDERS: ICD-10-CM

## 2020-12-15 PROBLEM — Z01.419 ENCOUNTER FOR ANNUAL ROUTINE GYNECOLOGICAL EXAMINATION: Status: RESOLVED | Noted: 2017-11-28 | Resolved: 2020-12-15

## 2020-12-29 ENCOUNTER — APPOINTMENT (OUTPATIENT)
Dept: GASTROENTEROLOGY | Facility: CLINIC | Age: 60
End: 2020-12-29

## 2020-12-30 ENCOUNTER — NON-APPOINTMENT (OUTPATIENT)
Age: 60
End: 2020-12-30

## 2020-12-30 LAB — SARS-COV-2 N GENE NPH QL NAA+PROBE: DETECTED

## 2020-12-31 ENCOUNTER — NON-APPOINTMENT (OUTPATIENT)
Age: 60
End: 2020-12-31

## 2021-01-05 ENCOUNTER — NON-APPOINTMENT (OUTPATIENT)
Age: 61
End: 2021-01-05

## 2021-02-12 ENCOUNTER — APPOINTMENT (OUTPATIENT)
Dept: INTERNAL MEDICINE | Facility: CLINIC | Age: 61
End: 2021-02-12
Payer: MEDICAID

## 2021-02-12 ENCOUNTER — OUTPATIENT (OUTPATIENT)
Dept: OUTPATIENT SERVICES | Facility: HOSPITAL | Age: 61
LOS: 1 days | End: 2021-02-12
Payer: MEDICAID

## 2021-02-12 ENCOUNTER — NON-APPOINTMENT (OUTPATIENT)
Age: 61
End: 2021-02-12

## 2021-02-12 VITALS
SYSTOLIC BLOOD PRESSURE: 120 MMHG | BODY MASS INDEX: 27.97 KG/M2 | WEIGHT: 152 LBS | HEART RATE: 69 BPM | DIASTOLIC BLOOD PRESSURE: 80 MMHG | OXYGEN SATURATION: 98 % | HEIGHT: 62 IN

## 2021-02-12 DIAGNOSIS — Z98.891 HISTORY OF UTERINE SCAR FROM PREVIOUS SURGERY: Chronic | ICD-10-CM

## 2021-02-12 DIAGNOSIS — I10 ESSENTIAL (PRIMARY) HYPERTENSION: ICD-10-CM

## 2021-02-12 DIAGNOSIS — S09.91XA UNSPECIFIED INJURY OF EAR, INITIAL ENCOUNTER: Chronic | ICD-10-CM

## 2021-02-12 PROCEDURE — G0463: CPT

## 2021-02-12 PROCEDURE — 99213 OFFICE O/P EST LOW 20 MIN: CPT | Mod: GE

## 2021-02-16 NOTE — ASSESSMENT
[FreeTextEntry1] : Ms. Chinchilla is a 60 year old woman with chronic medical issues including anterior cervical discectomy and fusion of C4-C7 who comes in with acute on chronic pain of her right upper tranpezius region. \par \par #R Shoulder Pain: Appears to have cervical pain with MRI showing spondylosis of C5-C7. Recent XR of the shoulder and C-spine shows no evidence of acute pathology and patient also followed up with ortho along with recent cervical MRI which showed spondylosis with no acute pathology. She suffers from chronic long term migraines associated with her cervical spondylosis and is followed by Dr. Ashley. Patient also followed by pain management and previously referred to PT by her neurosx for her chronic pain. Her pain currently appears to be a acute on chronic exacerbation. Low suspicion for an infectious process given no fevers, low suspicion for an acute fracture given the patient not on any glucocorticoids, no recent fall or trauma and is able to still have ROM. Low suspicion for cervical  myelopathy given no evidence of paresthesias, numbness, weakness. \par -Will hold off on imaging for now. Encouraged to follow up with Neurosx and neurology given they have been managing this pain for her. \par -Will start a trial of gabapentin for 14 days to give her some relief at night prior to her visit with neurosx and neurologist. \par -EKG for chest pain benign\par -RTC for CPE or PRN for symptoms. \par \par Plan D/w Dr. Santana.

## 2021-02-16 NOTE — PHYSICAL EXAM
[Normal Sclera/Conjunctiva] : normal sclera/conjunctiva [EOMI] : extraocular movements intact [Normal Outer Ear/Nose] : the outer ears and nose were normal in appearance [Clear to Auscultation] : lungs were clear to auscultation bilaterally [Normal S1, S2] : normal S1 and S2 [Normal] : normal gait, coordination grossly intact, no focal deficits and deep tendon reflexes were 2+ and symmetric [de-identified] : + Distress [de-identified] : + anxious mood; teary during exam.  [de-identified] : +TTP in the R trapezius region, no evidence of muscle spasms. Cervical motion with mild tenderness. Shoulder abduction and addudction 5/5 and passive and active ROM intact with no evidence of gross sensory loss. Biceps reflex and triceps reflex intact. No TTP in wrist area with normal flexion and extension of the wrist.

## 2021-02-16 NOTE — REVIEW OF SYSTEMS
[Negative] : Neurological [Pain] : no pain [Vision Problems] : no vision problems [FreeTextEntry5] : +R anterior chest pain [de-identified] : + R sided shoulder/trapezius pain radiating to R facial area and right glenohumeral area.

## 2021-02-16 NOTE — HISTORY OF PRESENT ILLNESS
[FreeTextEntry8] : Ms. Chinchilla is a 61 y/o female with an extensive past medical history including hypothyroidism, anxiety, depression, cervical osteoarthritis s/p C4-7 anterior cervical discectomy and fusion c/b post-op Oc's Syndrome (2017) with cervical radiculopathy, hx prediabetes (resolved, HbA1c 5.5% 1/2020), hx vertigo and early menopause (early 40s) who presents for an acute visit for her right sided shoulder pain. \par \par #R Sided Shoulder Pain: She states that her shoulder pain on the right side is chronic and normally is a 7/10. She requires a multitude of medications to control her pain and has had a history of cervical osteoarthritis along with C4-C7 anterior cervical discectomy and fusion followed by Neurosx Dr. Tim Timmons. She states that over the past two weeks she has been experiencing worsening of her cervical pain and states that she decided to come to the clinic because it is to the point where she is having difficulty falling asleep. She denies any paresthesias, numbness in the upper extremities or any weakness. She states that her pain is worsened by lifting her laundry/shopping bags  and that turning her head to the right side exacerbates her pain. She denies any recent fall or trauma to the area and states that her left shoulder is in mild to moderate pain like it is at baseline. She states that sometimes the pain radiates up to her R facial area and neck area and also to the right anterior chest area. The pain is a sharp pain and currently she states that it is a 10/10 in severity and unremitting. She has not had an appointment with Dr. Timmons recently and plans on making an appointment with him soon but was looking to get some pain relief in the meantime during this appointment. \par \par She itzel any vision changes, any L sided chest pain, SOB, leg swelling, recent travel, n/v/d/, loss of strength in upper extremities.

## 2021-02-17 DIAGNOSIS — M25.511 PAIN IN RIGHT SHOULDER: ICD-10-CM

## 2021-02-23 ENCOUNTER — APPOINTMENT (OUTPATIENT)
Dept: NEUROSURGERY | Facility: CLINIC | Age: 61
End: 2021-02-23
Payer: MEDICAID

## 2021-02-23 VITALS — BODY MASS INDEX: 27.97 KG/M2 | WEIGHT: 152 LBS | RESPIRATION RATE: 16 BRPM | HEIGHT: 62 IN

## 2021-02-23 VITALS — OXYGEN SATURATION: 98 % | DIASTOLIC BLOOD PRESSURE: 80 MMHG | SYSTOLIC BLOOD PRESSURE: 136 MMHG | HEART RATE: 75 BPM

## 2021-02-23 VITALS — TEMPERATURE: 97.8 F

## 2021-02-23 PROCEDURE — 99072 ADDL SUPL MATRL&STAF TM PHE: CPT

## 2021-02-23 PROCEDURE — 99214 OFFICE O/P EST MOD 30 MIN: CPT

## 2021-03-01 NOTE — PHYSICAL EXAM
[General Appearance - Alert] : alert [General Appearance - In No Acute Distress] : in no acute distress [Oriented To Time, Place, And Person] : oriented to person, place, and time [Impaired Insight] : insight and judgment were intact [Balance] : balance was intact [No Visual Abnormalities] : no visible abnormalities [Normal] : normal [Able to toe walk] : the patient was able to toe walk [Able to heel walk] : the patient was able to heel walk [Intact] : all reflexes within normal limits bilaterally [Sclera] : the sclera and conjunctiva were normal [PERRL With Normal Accommodation] : pupils were equal in size, round, reactive to light, with normal accommodation [Hearing Threshold Finger Rub Not [place]] : hearing was normal [Neck Appearance] : the appearance of the neck was normal [] : no respiratory distress [Respiration, Rhythm And Depth] : normal respiratory rhythm and effort [Edema] : there was no peripheral edema [Abnormal Walk] : normal gait [Involuntary Movements] : no involuntary movements were seen [Motor Tone] : muscle strength and tone were normal [Skin Color & Pigmentation] : normal skin color and pigmentation [Romberg's Sign] : Romberg's sign was negtive [Limited Balance] : balance was intact [Tremor] : no tremor present [Murphy] : Murphy's sign was not demonstrated

## 2021-03-01 NOTE — HISTORY OF PRESENT ILLNESS
[FreeTextEntry1] : Mrs. Chinchilla is a 59 yo woman s/p 1/28/17 ACDF C4-C7 who complains of chronic right upper traps, right upper back and radiating to her right side of the head for years who arrives for discussion.  She has not started PT, or consulted with pain management due to fear of Covid, or seen Rheumatology as requested by her PCP Dr. Arambula.\par \par \par 9/29/2020:  MRI and xrays obtained and reviewed with no pathological findings.  She was referred to pain management taking tylenol, advil, and ambien with partial relief.  Last episode about 1 week ago lasting most of day.  \par Post op left Oc's has resolved with ophthalmology following.\par \par Cervical - same, intermittent discomfort of right upper traps with pain free cervical range of motion.  MRI shows spondylosis of C5-C7.\par Back - she reports same chronic midline back pain radiating from neck to thoracic region.  MRI shows L5-S1 grade 1 spondylosis.\par Shoulder - she c/o pain in right shoulder exacerbated with shoulder flex/abduction.  Xrays show no fracture, dislocation, or lesion had f/u with ortho.\par \par She also suffers from chronic long term migraines with burning sensation in head followed by Dr. Ashley.\par \par She denies headaches, nausea, vomiting, weakness, or fever.  She denies chest pain, palpitations, shortness of breath, visual, hearing, or speech disturbances.

## 2021-03-01 NOTE — ASSESSMENT
[FreeTextEntry1] : Persistent right sided neck , posterior head pain.  Recommend PT, pain management, and will obtain an MRI to evaluate the cervical spine soft tissue, nerves, and spinal cord for pathological cause.\par \par 1)  PT\par 2)  Pain management - Dr. Ashley\par 3)  MRI Cervical wo - anytime prior to f/u\par 4)  RTO 3 months

## 2021-03-14 NOTE — PLAN
[FreeTextEntry1] : 60 yo F w/ a PMHx hypothyroidism, depression, cervical OA s/p C4-7 anterior cervical discectomy and fusion c/b post-op Oc's Syndrome, early menopause (early 40s) presenting for f/u visit after ED for persistent dizziness.\par \par # Dizziness\par - gave referral for neurologist.  Obtained records release form for records; neurologist not in network\par \par # HCM\par - gave fit test \par - gave referral for mammogram \par - gave flu shot \par \par Christine Lindquist MD\par Internal Medicine, PGY-2  no generalized seizures/no focal seizures

## 2021-03-21 ENCOUNTER — RESULT REVIEW (OUTPATIENT)
Age: 61
End: 2021-03-21

## 2021-03-21 ENCOUNTER — APPOINTMENT (OUTPATIENT)
Dept: MRI IMAGING | Facility: CLINIC | Age: 61
End: 2021-03-21
Payer: MEDICAID

## 2021-03-21 ENCOUNTER — OUTPATIENT (OUTPATIENT)
Dept: OUTPATIENT SERVICES | Facility: HOSPITAL | Age: 61
LOS: 1 days | End: 2021-03-21
Payer: MEDICAID

## 2021-03-21 DIAGNOSIS — Z98.891 HISTORY OF UTERINE SCAR FROM PREVIOUS SURGERY: Chronic | ICD-10-CM

## 2021-03-21 DIAGNOSIS — S09.91XA UNSPECIFIED INJURY OF EAR, INITIAL ENCOUNTER: Chronic | ICD-10-CM

## 2021-03-21 DIAGNOSIS — M54.2 CERVICALGIA: ICD-10-CM

## 2021-03-21 DIAGNOSIS — Z98.1 ARTHRODESIS STATUS: ICD-10-CM

## 2021-03-21 DIAGNOSIS — R51.9 HEADACHE, UNSPECIFIED: ICD-10-CM

## 2021-03-21 PROCEDURE — 72141 MRI NECK SPINE W/O DYE: CPT

## 2021-03-21 PROCEDURE — 72141 MRI NECK SPINE W/O DYE: CPT | Mod: 26

## 2021-04-30 ENCOUNTER — APPOINTMENT (OUTPATIENT)
Dept: PAIN MANAGEMENT | Facility: CLINIC | Age: 61
End: 2021-04-30

## 2021-05-25 ENCOUNTER — APPOINTMENT (OUTPATIENT)
Dept: NEUROSURGERY | Facility: CLINIC | Age: 61
End: 2021-05-25

## 2021-05-26 ENCOUNTER — APPOINTMENT (OUTPATIENT)
Dept: NEUROSURGERY | Facility: CLINIC | Age: 61
End: 2021-05-26

## 2021-06-08 ENCOUNTER — APPOINTMENT (OUTPATIENT)
Dept: NEUROSURGERY | Facility: CLINIC | Age: 61
End: 2021-06-08
Payer: MEDICAID

## 2021-06-15 ENCOUNTER — APPOINTMENT (OUTPATIENT)
Dept: NEUROSURGERY | Facility: CLINIC | Age: 61
End: 2021-06-15
Payer: MEDICAID

## 2021-06-15 PROCEDURE — 99213 OFFICE O/P EST LOW 20 MIN: CPT

## 2021-06-17 VITALS
HEART RATE: 83 BPM | OXYGEN SATURATION: 100 % | WEIGHT: 153 LBS | BODY MASS INDEX: 28.16 KG/M2 | DIASTOLIC BLOOD PRESSURE: 81 MMHG | RESPIRATION RATE: 18 BRPM | HEIGHT: 62 IN | SYSTOLIC BLOOD PRESSURE: 126 MMHG

## 2021-06-17 NOTE — REASON FOR VISIT
[FreeTextEntry1] : 3 month f/u chronic neck pain\par 3/2021 MRI Cervical wo - PACS/ [facility] - full report below

## 2021-06-17 NOTE — ASSESSMENT
[FreeTextEntry1] : Reviewed 3/2021 MRI Cervical which does not show any surgical pathology.  Again discussed pain management for pain relief and provided a new referral for Dr. Shah whose location is near her home.\par \par 1)  Pain management - Dr. Shah\par 2)  f/u PRN

## 2021-06-17 NOTE — RESULTS/DATA
[FreeTextEntry1] : [facility]\par    [facility] [place] [facility] Department of Radiology\par   Radiology Report\par \par Patient Name: RED AGUILAR   Report Date: 21-Mar-2021 10:39.00 \par Patient ID: [id number] (00), [id number] (EPI)  Accession No.: [id number] \par Patient Birth Date: 1960  Report Status: F \par Referring Physician: [id number] KRISTI EDDY   Reason For Study: neck pain  \par \par EXAM: MR SPINE CERVICAL\par \par PROCEDURE DATE: 03/21/2021\par \par INTERPRETATION: MR CERVICAL SPINE\par \par HISTORY: Cervical spine pain. Evaluate for increasing pathology. Spinal fusion 2 years ago.\par \par TECHNIQUE: Multiplanar MRI of the cervical spine was performed without contrast using 5 sequences.\par \par COMPARISON: Cervical spine x-rays dated September 21, 2020 and MRI dated January 28, 2020.\par \par FINDINGS:\par \par OSSEOUS STRUCTURES\par  Fractures: None.\par  Postoperative Changes: Anterior interbody fusion hardware is again seen between C4 and C7.\par  Alignment: Mild reversal of the normal lordosis is noted. Slight anterolisthesis is noted at C7-T1.\par  Marrow Signal: Several vertebral hemangioma are again seen with dominant C3 hemangioma.\par \par SPINAL CORD\par  Signal: Normal.\par \par DISC LEVELS\par Craniovertebral Junction: Maintained.\par \par C2-C3: Disc height is maintained. There is mild right and mild-to-moderate left facet arthrosis.\par \par C3-C4: Slight disc bulging is present without central canal stenosis. Uncinate hypertrophy contributes to mild bilateral neural foraminal stenosis. There is mild right and mild-to-moderate left facet arthrosis.\par \par C4-C5: Anterior interbody fusion hardware is present and there is no stenosis. There is osseous fusion of both facet joints with mild hypertrophy.\par \par C5-C6: Anterior interbody fusion hardware is present and there is no central canal stenosis. Uncinate hypertrophy contributes to mild to moderate bilateral neural foraminal stenosis.\par \par C6-C7: Anterior interbody fusion hardware is present without stenosis.\par \par C7-T1: Slight anterolisthesis is present without stenosis. There is mild to moderate bilateral facet arthrosis.\par \par IMAGED INTRACRANIAL STRUCTURES\par Unremarkable.\par \par VISUALIZED PARANASAL SINUSES\par Clear.\par \par SOFT TISSUES\par Partially imaged right thyroid nodule measures up to 0.7 cm in size.\par \par IMPRESSION:\par 1. Anterior interbody spinal fusion hardware is again seen between C4 and C7 without stenosis.\par 2. Mild degenerative disc disease is present without central canal stenosis.\par 3. There is variable neural foraminal stenosis.\par 4. Appearance is stable.\par \par CHAY MATHEW MD; Attending Radiologist\par This document has been electronically signed. Mar 21 2021 10:39AM

## 2021-06-17 NOTE — HISTORY OF PRESENT ILLNESS
[FreeTextEntry1] : Mrs. Chinchilla is a 59 yo woman s/p 1/28/17 ACDF C4-C7 who complains of chronic right upper traps, right upper back and radiating to her right side of the head for years who arrives for discussion.  She has not started PT, or consulted with pain management due to fear of Covid, and far away, or seen Rheumatology as requested by her PCP Dr. Arambula.\par \par Today, she arrives with new MRI for review - in PACS/ [facility] full report below\par \par 9/29/2020:  MRI and xrays obtained and reviewed with no pathological findings.  She was referred to pain management taking tylenol, advil, and ambien with partial relief.  Last episode about 1 week ago lasting most of day.  \par Post op left Oc's has resolved with ophthalmology following.\par \par Cervical - same, intermittent discomfort of right upper traps with pain free cervical range of motion.  MRI shows spondylosis of C5-C7.\par Back - she reports same chronic midline back pain radiating from neck to thoracic region.  MRI shows L5-S1 grade 1 spondylosis.\par Shoulder - she c/o pain in right shoulder exacerbated with shoulder flex/abduction.  Xrays show no fracture, dislocation, or lesion had f/u with ortho.\par \par She also suffers from chronic long term migraines with burning sensation in head followed by Dr. Ashley.\par \par She denies headaches, nausea, vomiting, weakness, or fever.  She denies chest pain, palpitations, shortness of breath, visual, hearing, or speech disturbances.

## 2021-06-17 NOTE — PHYSICAL EXAM
[General Appearance - Alert] : alert [General Appearance - In No Acute Distress] : in no acute distress [Oriented To Time, Place, And Person] : oriented to person, place, and time [Impaired Insight] : insight and judgment were intact [Balance] : balance was intact [No Visual Abnormalities] : no visible abnormalities [Normal] : normal [Able to toe walk] : the patient was able to toe walk [Able to heel walk] : the patient was able to heel walk [Intact] : all reflexes within normal limits bilaterally [Sclera] : the sclera and conjunctiva were normal [PERRL With Normal Accommodation] : pupils were equal in size, round, reactive to light, with normal accommodation [Hearing Threshold Finger Rub Not [place]] : hearing was normal [Neck Appearance] : the appearance of the neck was normal [] : no respiratory distress [Respiration, Rhythm And Depth] : normal respiratory rhythm and effort [Edema] : there was no peripheral edema [Abnormal Walk] : normal gait [Motor Tone] : muscle strength and tone were normal [Involuntary Movements] : no involuntary movements were seen [Skin Color & Pigmentation] : normal skin color and pigmentation [Romberg's Sign] : Romberg's sign was negtive [Limited Balance] : balance was intact [Tremor] : no tremor present [Murphy] : Murphy's sign was not demonstrated

## 2021-06-25 ENCOUNTER — APPOINTMENT (OUTPATIENT)
Dept: INTERNAL MEDICINE | Facility: CLINIC | Age: 61
End: 2021-06-25

## 2021-06-28 ENCOUNTER — LABORATORY RESULT (OUTPATIENT)
Age: 61
End: 2021-06-28

## 2021-06-28 ENCOUNTER — OUTPATIENT (OUTPATIENT)
Dept: OUTPATIENT SERVICES | Facility: HOSPITAL | Age: 61
LOS: 1 days | End: 2021-06-28
Payer: MEDICAID

## 2021-06-28 ENCOUNTER — APPOINTMENT (OUTPATIENT)
Dept: INTERNAL MEDICINE | Facility: CLINIC | Age: 61
End: 2021-06-28
Payer: MEDICAID

## 2021-06-28 VITALS
WEIGHT: 145 LBS | HEIGHT: 62 IN | SYSTOLIC BLOOD PRESSURE: 130 MMHG | DIASTOLIC BLOOD PRESSURE: 80 MMHG | HEART RATE: 82 BPM | BODY MASS INDEX: 26.68 KG/M2 | OXYGEN SATURATION: 99 %

## 2021-06-28 DIAGNOSIS — Z98.891 HISTORY OF UTERINE SCAR FROM PREVIOUS SURGERY: Chronic | ICD-10-CM

## 2021-06-28 DIAGNOSIS — I10 ESSENTIAL (PRIMARY) HYPERTENSION: ICD-10-CM

## 2021-06-28 DIAGNOSIS — S09.91XA UNSPECIFIED INJURY OF EAR, INITIAL ENCOUNTER: Chronic | ICD-10-CM

## 2021-06-28 LAB
HCT VFR BLD CALC: 38.1 % — SIGNIFICANT CHANGE UP (ref 34.5–45)
HGB BLD-MCNC: 12.5 G/DL — SIGNIFICANT CHANGE UP (ref 11.5–15.5)
MCHC RBC-ENTMCNC: 29.8 PG — SIGNIFICANT CHANGE UP (ref 27–34)
MCHC RBC-ENTMCNC: 32.8 GM/DL — SIGNIFICANT CHANGE UP (ref 32–36)
MCV RBC AUTO: 90.9 FL — SIGNIFICANT CHANGE UP (ref 80–100)
PLATELET # BLD AUTO: 335 K/UL — SIGNIFICANT CHANGE UP (ref 150–400)
RBC # BLD: 4.19 M/UL — SIGNIFICANT CHANGE UP (ref 3.8–5.2)
RBC # FLD: 13.5 % — SIGNIFICANT CHANGE UP (ref 10.3–14.5)
WBC # BLD: 5.22 K/UL — SIGNIFICANT CHANGE UP (ref 3.8–10.5)
WBC # FLD AUTO: 5.22 K/UL — SIGNIFICANT CHANGE UP (ref 3.8–10.5)

## 2021-06-28 PROCEDURE — 85652 RBC SED RATE AUTOMATED: CPT

## 2021-06-28 PROCEDURE — 80053 COMPREHEN METABOLIC PANEL: CPT

## 2021-06-28 PROCEDURE — 85027 COMPLETE CBC AUTOMATED: CPT

## 2021-06-28 PROCEDURE — 99213 OFFICE O/P EST LOW 20 MIN: CPT | Mod: GE

## 2021-06-28 PROCEDURE — 86480 TB TEST CELL IMMUN MEASURE: CPT

## 2021-06-28 PROCEDURE — G0463: CPT

## 2021-06-28 PROCEDURE — 84443 ASSAY THYROID STIM HORMONE: CPT

## 2021-06-28 PROCEDURE — 84439 ASSAY OF FREE THYROXINE: CPT

## 2021-06-28 RX ORDER — GABAPENTIN 300 MG/1
300 CAPSULE ORAL
Qty: 14 | Refills: 0 | Status: DISCONTINUED | COMMUNITY
Start: 2021-02-12 | End: 2021-06-28

## 2021-06-28 NOTE — PHYSICAL EXAM
[Normal Outer Ear/Nose] : the outer ears and nose were normal in appearance [Normal Oropharynx] : the oropharynx was normal [Normal] : no rash

## 2021-06-29 LAB
ALBUMIN SERPL ELPH-MCNC: 5 G/DL — SIGNIFICANT CHANGE UP (ref 3.3–5)
ALP SERPL-CCNC: 107 U/L — SIGNIFICANT CHANGE UP (ref 40–120)
ALT FLD-CCNC: 22 U/L — SIGNIFICANT CHANGE UP (ref 10–45)
ANION GAP SERPL CALC-SCNC: 17 MMOL/L — SIGNIFICANT CHANGE UP (ref 5–17)
AST SERPL-CCNC: 20 U/L — SIGNIFICANT CHANGE UP (ref 10–40)
BILIRUB SERPL-MCNC: 0.2 MG/DL — SIGNIFICANT CHANGE UP (ref 0.2–1.2)
BUN SERPL-MCNC: 13 MG/DL — SIGNIFICANT CHANGE UP (ref 7–23)
CALCIUM SERPL-MCNC: 10.2 MG/DL — SIGNIFICANT CHANGE UP (ref 8.4–10.5)
CHLORIDE SERPL-SCNC: 103 MMOL/L — SIGNIFICANT CHANGE UP (ref 96–108)
CO2 SERPL-SCNC: 22 MMOL/L — SIGNIFICANT CHANGE UP (ref 22–31)
CREAT SERPL-MCNC: 0.71 MG/DL — SIGNIFICANT CHANGE UP (ref 0.5–1.3)
ERYTHROCYTE [SEDIMENTATION RATE] IN BLOOD: 22 MM/HR — HIGH (ref 0–20)
GLUCOSE SERPL-MCNC: 74 MG/DL — SIGNIFICANT CHANGE UP (ref 70–99)
POTASSIUM SERPL-MCNC: 4.3 MMOL/L — SIGNIFICANT CHANGE UP (ref 3.5–5.3)
POTASSIUM SERPL-SCNC: 4.3 MMOL/L — SIGNIFICANT CHANGE UP (ref 3.5–5.3)
PROT SERPL-MCNC: 7.9 G/DL — SIGNIFICANT CHANGE UP (ref 6–8.3)
SODIUM SERPL-SCNC: 142 MMOL/L — SIGNIFICANT CHANGE UP (ref 135–145)
T4 FREE SERPL-MCNC: 1.5 NG/DL — SIGNIFICANT CHANGE UP (ref 0.9–1.8)
TSH SERPL-MCNC: 1.76 UIU/ML — SIGNIFICANT CHANGE UP (ref 0.27–4.2)

## 2021-06-30 LAB
GAMMA INTERFERON BACKGROUND BLD IA-ACNC: 0.02 IU/ML — SIGNIFICANT CHANGE UP
M TB IFN-G BLD-IMP: NEGATIVE — SIGNIFICANT CHANGE UP
M TB IFN-G CD4+ BCKGRND COR BLD-ACNC: 0 IU/ML — SIGNIFICANT CHANGE UP
M TB IFN-G CD4+CD8+ BCKGRND COR BLD-ACNC: 0 IU/ML — SIGNIFICANT CHANGE UP
QUANT TB PLUS MITOGEN MINUS NIL: 9.42 IU/ML — SIGNIFICANT CHANGE UP

## 2021-06-30 NOTE — ASSESSMENT
[FreeTextEntry1] : Ms. Chinchilla is a 61 y/o female with an extensive past medical history including hypothyroidism, anxiety, depression, cervical osteoarthritis s/p C4-7 anterior cervical discectomy and fusion c/b post-op Oc's Syndrome (2017) with cervical radiculopathy, hx prediabetes (resolved, HbA1c 5.5% 1/2020), hx vertigo and early menopause (early 40s) who presents for an acute visit for abdominal pain.\par \par #Epigastric pain\par - h. pylori stool antigen\par - can consider trial of PPI after H pylori antigen\par \par #Night sweats\par -send quant gold to  r/o TB\par -CBC, ESR to r/o heme malignancy\par -could be due to fluoxetine - 2-8% have night sweats\par \par #hypothyroidism\par -has been taking levothyroxine 88mcg qd since 2017\par -thyroid labs \par \par #dysphagia\par -testing above in night sweats to r/o hematologic malignancy, if still problematic on return visit can consider additional testing\par \par #HCM\par CMP\par RTC 5-10 weeks for CPE with me\par

## 2021-06-30 NOTE — REVIEW OF SYSTEMS
[Recent Change In Weight] : ~T recent weight change [Abdominal Pain] : abdominal pain [Negative] : Psychiatric [Fever] : no fever [Night Sweats] : no night sweats [Nausea] : no nausea [Constipation] : no constipation [Vomiting] : no vomiting

## 2021-06-30 NOTE — HISTORY OF PRESENT ILLNESS
[Moderate] : moderate [___ Weeks ago] : [unfilled] weeks ago [Paroxysmal] : paroxysmal [Anorexia] : anorexia [Abdominal Pain] : abdominal pain [Nausea] : no nausea [Vomiting] : no vomiting [Diarrhea] : no diarrhea [Constipation] : no constipation [Sore Throat] : no sore throat [FreeTextEntry7] : epigastric [FreeTextEntry4] : bending over, no association with eating [FreeTextEntry8] : Ms. Chinchilla is a 61 y/o female with an extensive past medical history including hypothyroidism, anxiety, depression, cervical osteoarthritis s/p C4-7 anterior cervical discectomy and fusion c/b post-op Oc's Syndrome (2017) with cervical radiculopathy, hx prediabetes (resolved, HbA1c 5.5% 1/2020), hx vertigo and early menopause (early 40s) who presents with 4 weeks of burning abdominal pain. \par \par The abdominal pain is triggered by bending down, and usually last for 1 min and then goes away. Not related to eating. No hx of hernia.No n/v/d/constipation. Normal stools. Reports this impacts her sleep. Her mouth feels dry at night when sleeping. Denies any bitter or sour taste in the mouth. \par \par Reports occ joint swelling in knees and ankles. Also with R knee pain. Also with R-sided lumbar pain.\par \par Hx of hypothyroidism, taking levothyroxine 88mcg. \par \par Reports night sweats, requiring clothes change. No fevers, cough, unintentional weight loss (though per VS review, has had weight loss of 8lb). Reports poor appetite.\par \par Reports 1 month of voice becoming deeper, dysphagia with thin liquids, unable to specify affects on solids. Denies hx of smoking. Reports endoscopy 5 years ago, and was told she had inflammation in the stomach and given pill to take. Reports laryngoscopy reportedly 30 years ago, no findings.  \par \par Med rec performed. Taking high dose of fluoxetine 20mg x2 caps twice daily.\par

## 2021-07-01 ENCOUNTER — NON-APPOINTMENT (OUTPATIENT)
Age: 61
End: 2021-07-01

## 2021-07-02 DIAGNOSIS — R10.13 EPIGASTRIC PAIN: ICD-10-CM

## 2021-07-02 DIAGNOSIS — E03.9 HYPOTHYROIDISM, UNSPECIFIED: ICD-10-CM

## 2021-07-17 ENCOUNTER — APPOINTMENT (OUTPATIENT)
Dept: MAMMOGRAPHY | Facility: CLINIC | Age: 61
End: 2021-07-17

## 2021-07-19 ENCOUNTER — RESULT REVIEW (OUTPATIENT)
Age: 61
End: 2021-07-19

## 2021-07-19 ENCOUNTER — OUTPATIENT (OUTPATIENT)
Dept: OUTPATIENT SERVICES | Facility: HOSPITAL | Age: 61
LOS: 1 days | End: 2021-07-19
Payer: MEDICAID

## 2021-07-19 ENCOUNTER — APPOINTMENT (OUTPATIENT)
Dept: MAMMOGRAPHY | Facility: CLINIC | Age: 61
End: 2021-07-19
Payer: MEDICAID

## 2021-07-19 DIAGNOSIS — Z00.00 ENCOUNTER FOR GENERAL ADULT MEDICAL EXAMINATION WITHOUT ABNORMAL FINDINGS: ICD-10-CM

## 2021-07-19 DIAGNOSIS — S09.91XA UNSPECIFIED INJURY OF EAR, INITIAL ENCOUNTER: Chronic | ICD-10-CM

## 2021-07-19 DIAGNOSIS — Z98.891 HISTORY OF UTERINE SCAR FROM PREVIOUS SURGERY: Chronic | ICD-10-CM

## 2021-07-19 PROCEDURE — 77067 SCR MAMMO BI INCL CAD: CPT

## 2021-07-19 PROCEDURE — 77063 BREAST TOMOSYNTHESIS BI: CPT

## 2021-07-19 PROCEDURE — 77063 BREAST TOMOSYNTHESIS BI: CPT | Mod: 26

## 2021-07-19 PROCEDURE — 77067 SCR MAMMO BI INCL CAD: CPT | Mod: 26

## 2021-07-23 ENCOUNTER — NON-APPOINTMENT (OUTPATIENT)
Age: 61
End: 2021-07-23

## 2021-08-03 ENCOUNTER — RESULT REVIEW (OUTPATIENT)
Age: 61
End: 2021-08-03

## 2021-08-03 ENCOUNTER — OUTPATIENT (OUTPATIENT)
Dept: OUTPATIENT SERVICES | Facility: HOSPITAL | Age: 61
LOS: 1 days | End: 2021-08-03
Payer: MEDICAID

## 2021-08-03 ENCOUNTER — APPOINTMENT (OUTPATIENT)
Dept: ULTRASOUND IMAGING | Facility: CLINIC | Age: 61
End: 2021-08-03
Payer: MEDICAID

## 2021-08-03 DIAGNOSIS — Z98.891 HISTORY OF UTERINE SCAR FROM PREVIOUS SURGERY: Chronic | ICD-10-CM

## 2021-08-03 DIAGNOSIS — S09.91XA UNSPECIFIED INJURY OF EAR, INITIAL ENCOUNTER: Chronic | ICD-10-CM

## 2021-08-03 DIAGNOSIS — Z00.8 ENCOUNTER FOR OTHER GENERAL EXAMINATION: ICD-10-CM

## 2021-08-03 PROCEDURE — 76642 ULTRASOUND BREAST LIMITED: CPT | Mod: 26,LT

## 2021-08-03 PROCEDURE — 76642 ULTRASOUND BREAST LIMITED: CPT

## 2021-08-06 ENCOUNTER — NON-APPOINTMENT (OUTPATIENT)
Age: 61
End: 2021-08-06

## 2021-08-27 ENCOUNTER — APPOINTMENT (OUTPATIENT)
Dept: OBGYN | Facility: CLINIC | Age: 61
End: 2021-08-27

## 2021-11-01 ENCOUNTER — APPOINTMENT (OUTPATIENT)
Dept: INTERNAL MEDICINE | Facility: CLINIC | Age: 61
End: 2021-11-01

## 2021-11-02 ENCOUNTER — APPOINTMENT (OUTPATIENT)
Dept: INTERNAL MEDICINE | Facility: CLINIC | Age: 61
End: 2021-11-02
Payer: MEDICAID

## 2021-11-02 ENCOUNTER — OUTPATIENT (OUTPATIENT)
Dept: OUTPATIENT SERVICES | Facility: HOSPITAL | Age: 61
LOS: 1 days | End: 2021-11-02
Payer: MEDICAID

## 2021-11-02 ENCOUNTER — NON-APPOINTMENT (OUTPATIENT)
Age: 61
End: 2021-11-02

## 2021-11-02 VITALS
DIASTOLIC BLOOD PRESSURE: 80 MMHG | BODY MASS INDEX: 26.68 KG/M2 | SYSTOLIC BLOOD PRESSURE: 122 MMHG | OXYGEN SATURATION: 98 % | HEART RATE: 80 BPM | WEIGHT: 145 LBS | HEIGHT: 62 IN

## 2021-11-02 DIAGNOSIS — Z98.891 HISTORY OF UTERINE SCAR FROM PREVIOUS SURGERY: Chronic | ICD-10-CM

## 2021-11-02 DIAGNOSIS — I10 ESSENTIAL (PRIMARY) HYPERTENSION: ICD-10-CM

## 2021-11-02 DIAGNOSIS — S09.91XA UNSPECIFIED INJURY OF EAR, INITIAL ENCOUNTER: Chronic | ICD-10-CM

## 2021-11-02 PROCEDURE — G0463: CPT

## 2021-11-02 PROCEDURE — 99213 OFFICE O/P EST LOW 20 MIN: CPT | Mod: GE

## 2021-11-08 NOTE — PHYSICAL EXAM
[Normal] : soft, non-tender, non-distended, no masses palpated, no HSM and normal bowel sounds [de-identified] : L side monica syndrome (L miosis) [de-identified] : uvula deviated to left. monica syndrome L sided. otherwise wnl neuro exam.

## 2021-11-12 DIAGNOSIS — R51.9 HEADACHE, UNSPECIFIED: ICD-10-CM

## 2021-11-12 DIAGNOSIS — H92.09 OTALGIA, UNSPECIFIED EAR: ICD-10-CM

## 2021-11-15 ENCOUNTER — APPOINTMENT (OUTPATIENT)
Dept: OTOLARYNGOLOGY | Facility: CLINIC | Age: 61
End: 2021-11-15

## 2021-12-08 ENCOUNTER — RX RENEWAL (OUTPATIENT)
Age: 61
End: 2021-12-08

## 2021-12-27 LAB — SARS-COV-2 N GENE NPH QL NAA+PROBE: NOT DETECTED

## 2021-12-28 ENCOUNTER — APPOINTMENT (OUTPATIENT)
Dept: OTOLARYNGOLOGY | Facility: CLINIC | Age: 61
End: 2021-12-28

## 2022-01-05 ENCOUNTER — NON-APPOINTMENT (OUTPATIENT)
Age: 62
End: 2022-01-05

## 2022-01-15 NOTE — PLAN
English
[FreeTextEntry1] : \par PLAN AS ABOVE IN ASSESSMENT.\par \par RTC next year for CPE.\par \par D/w Dr. Beltran.

## 2022-01-25 ENCOUNTER — APPOINTMENT (OUTPATIENT)
Dept: NEUROSURGERY | Facility: CLINIC | Age: 62
End: 2022-01-25
Payer: MEDICAID

## 2022-01-25 ENCOUNTER — RESULT REVIEW (OUTPATIENT)
Age: 62
End: 2022-01-25

## 2022-01-25 VITALS
OXYGEN SATURATION: 98 % | HEIGHT: 62 IN | HEART RATE: 61 BPM | BODY MASS INDEX: 26.68 KG/M2 | WEIGHT: 145 LBS | SYSTOLIC BLOOD PRESSURE: 134 MMHG | DIASTOLIC BLOOD PRESSURE: 81 MMHG

## 2022-01-25 DIAGNOSIS — M47.812 SPONDYLOSIS W/OUT MYELOPATHY OR RADICULOPATHY, CERVICAL REGION: ICD-10-CM

## 2022-01-25 PROCEDURE — 99213 OFFICE O/P EST LOW 20 MIN: CPT

## 2022-01-26 NOTE — PHYSICAL EXAM
[General Appearance - Alert] : alert [General Appearance - In No Acute Distress] : in no acute distress [General Appearance - Well Nourished] : well nourished [General Appearance - Well Developed] : well developed [Oriented To Time, Place, And Person] : oriented to person, place, and time [Impaired Insight] : insight and judgment were intact [Affect] : the affect was normal [Mood] : the mood was normal [Motor Strength] : muscle strength was normal in all four extremities [No Muscle Atrophy] : normal bulk in all four extremities [No Visual Abnormalities] : no visible abnormalities [No Tenderness to Palpation] : no spine tenderness on palpation [No Spinal Tenderness] : no spinal tenderness [Nail Clubbing] : no clubbing  or cyanosis of the fingernails [Involuntary Movements] : no involuntary movements were seen [Motor Tone] : muscle strength and tone were normal [Skin Color & Pigmentation] : normal skin color and pigmentation [Tremor] : no tremor present [FreeTextEntry1] : right knee swelling

## 2022-01-26 NOTE — ASSESSMENT
[FreeTextEntry1] : 61 years old woman s/p ACDF C4-5, C5-6, C6-7 in 2017. She had improvement in her symptoms for about 1 year after the surgery but then began to have pain and stiffness in right side of her neck. MRI last year did not show any surgical pathology. She has not seen PT or pain management\par \par Plan:\par - Pain management\par - Xray cervical spine in flexion and extension in June 2022\par - CT cervical spine in June 2022\par - Referral to neuro and orthopedic

## 2022-01-26 NOTE — REVIEW OF SYSTEMS
[As Noted in HPI] : as noted in HPI [Negative] : Respiratory [Abdominal Pain] : no abdominal pain [Vomiting] : no vomiting [Diarrhea] : no diarrhea [Dysuria] : no dysuria [Incontinence] : no incontinence [Arthralgias] : no arthralgias [Skin Lesions] : no skin lesions [Skin Wound] : no skin wound [Itching] : no itching [Easy Bleeding] : no tendency for easy bleeding [FreeTextEntry3] : PINKY Grey resolved

## 2022-01-26 NOTE — HISTORY OF PRESENT ILLNESS
[Other: ___] : [unfilled] [FreeTextEntry1] : The patient is  s/p t a C4-C5, C5-C6 ,C6-C7 anterior cervical discectomy and fusion on 1/28 /2017  to treat cervical radiculopathy. The patient stated that she had good results from the surgery for at least a year and then she started to have pain and stiffness in the right side her neck with difficulty turning her head to the right side with radiation into her shoulder.  She has difficulty sleeping on her right side.. She did physical therapy about a year ago with little relief. The patient denies having any epidural injections, chiropractic sore acupuncture. \par \par MRI cervical spine in 3/2121 showed variable neural foraminal stenosis. \par \par Pt continues to be symptomatic with pain in back of her head and neck radiating to her right shoulder. She has not started to see pain management.  She also complains of dizziness and right knee pain

## 2022-02-07 ENCOUNTER — RX RENEWAL (OUTPATIENT)
Age: 62
End: 2022-02-07

## 2022-02-08 ENCOUNTER — APPOINTMENT (OUTPATIENT)
Dept: ORTHOPEDIC SURGERY | Facility: CLINIC | Age: 62
End: 2022-02-08
Payer: MEDICAID

## 2022-02-08 VITALS — HEIGHT: 64 IN | BODY MASS INDEX: 25.1 KG/M2 | WEIGHT: 147 LBS

## 2022-02-08 DIAGNOSIS — M17.11 UNILATERAL PRIMARY OSTEOARTHRITIS, RIGHT KNEE: ICD-10-CM

## 2022-02-08 DIAGNOSIS — M25.562 PAIN IN RIGHT KNEE: ICD-10-CM

## 2022-02-08 DIAGNOSIS — M25.561 PAIN IN RIGHT KNEE: ICD-10-CM

## 2022-02-08 PROCEDURE — 73564 X-RAY EXAM KNEE 4 OR MORE: CPT | Mod: RT

## 2022-02-08 PROCEDURE — 99203 OFFICE O/P NEW LOW 30 MIN: CPT

## 2022-02-17 ENCOUNTER — RESULT REVIEW (OUTPATIENT)
Age: 62
End: 2022-02-17

## 2022-02-17 ENCOUNTER — APPOINTMENT (OUTPATIENT)
Dept: ULTRASOUND IMAGING | Facility: CLINIC | Age: 62
End: 2022-02-17
Payer: MEDICAID

## 2022-02-17 ENCOUNTER — OUTPATIENT (OUTPATIENT)
Dept: OUTPATIENT SERVICES | Facility: HOSPITAL | Age: 62
LOS: 1 days | End: 2022-02-17
Payer: MEDICAID

## 2022-02-17 ENCOUNTER — NON-APPOINTMENT (OUTPATIENT)
Age: 62
End: 2022-02-17

## 2022-02-17 ENCOUNTER — APPOINTMENT (OUTPATIENT)
Dept: MAMMOGRAPHY | Facility: CLINIC | Age: 62
End: 2022-02-17
Payer: MEDICAID

## 2022-02-17 DIAGNOSIS — S09.91XA UNSPECIFIED INJURY OF EAR, INITIAL ENCOUNTER: Chronic | ICD-10-CM

## 2022-02-17 DIAGNOSIS — Z00.00 ENCOUNTER FOR GENERAL ADULT MEDICAL EXAMINATION WITHOUT ABNORMAL FINDINGS: ICD-10-CM

## 2022-02-17 DIAGNOSIS — Z98.891 HISTORY OF UTERINE SCAR FROM PREVIOUS SURGERY: Chronic | ICD-10-CM

## 2022-02-17 PROCEDURE — G0279: CPT | Mod: 26

## 2022-02-17 PROCEDURE — 76642 ULTRASOUND BREAST LIMITED: CPT

## 2022-02-17 PROCEDURE — 76642 ULTRASOUND BREAST LIMITED: CPT | Mod: 26,LT

## 2022-02-17 PROCEDURE — 77065 DX MAMMO INCL CAD UNI: CPT

## 2022-02-17 PROCEDURE — 77065 DX MAMMO INCL CAD UNI: CPT | Mod: 26,LT

## 2022-02-17 PROCEDURE — G0279: CPT

## 2022-02-18 ENCOUNTER — NON-APPOINTMENT (OUTPATIENT)
Age: 62
End: 2022-02-18

## 2022-02-28 ENCOUNTER — APPOINTMENT (OUTPATIENT)
Dept: CT IMAGING | Facility: CLINIC | Age: 62
End: 2022-02-28

## 2022-02-28 ENCOUNTER — OUTPATIENT (OUTPATIENT)
Dept: OUTPATIENT SERVICES | Facility: HOSPITAL | Age: 62
LOS: 1 days | End: 2022-02-28
Payer: MEDICAID

## 2022-02-28 ENCOUNTER — APPOINTMENT (OUTPATIENT)
Dept: RADIOLOGY | Facility: CLINIC | Age: 62
End: 2022-02-28

## 2022-02-28 DIAGNOSIS — Z98.1 ARTHRODESIS STATUS: ICD-10-CM

## 2022-02-28 DIAGNOSIS — S09.91XA UNSPECIFIED INJURY OF EAR, INITIAL ENCOUNTER: Chronic | ICD-10-CM

## 2022-02-28 DIAGNOSIS — Z98.891 HISTORY OF UTERINE SCAR FROM PREVIOUS SURGERY: Chronic | ICD-10-CM

## 2022-02-28 PROCEDURE — 72125 CT NECK SPINE W/O DYE: CPT

## 2022-02-28 PROCEDURE — 72040 X-RAY EXAM NECK SPINE 2-3 VW: CPT

## 2022-03-03 ENCOUNTER — OUTPATIENT (OUTPATIENT)
Dept: OUTPATIENT SERVICES | Facility: HOSPITAL | Age: 62
LOS: 1 days | End: 2022-03-03
Payer: MEDICAID

## 2022-03-03 ENCOUNTER — APPOINTMENT (OUTPATIENT)
Dept: MAMMOGRAPHY | Facility: IMAGING CENTER | Age: 62
End: 2022-03-03
Payer: MEDICAID

## 2022-03-03 ENCOUNTER — RESULT REVIEW (OUTPATIENT)
Age: 62
End: 2022-03-03

## 2022-03-03 DIAGNOSIS — S09.91XA UNSPECIFIED INJURY OF EAR, INITIAL ENCOUNTER: Chronic | ICD-10-CM

## 2022-03-03 DIAGNOSIS — Z00.8 ENCOUNTER FOR OTHER GENERAL EXAMINATION: ICD-10-CM

## 2022-03-03 DIAGNOSIS — Z98.891 HISTORY OF UTERINE SCAR FROM PREVIOUS SURGERY: Chronic | ICD-10-CM

## 2022-03-03 PROCEDURE — 77065 DX MAMMO INCL CAD UNI: CPT

## 2022-03-03 PROCEDURE — 77065 DX MAMMO INCL CAD UNI: CPT | Mod: 26,LT

## 2022-03-03 PROCEDURE — 88305 TISSUE EXAM BY PATHOLOGIST: CPT | Mod: 26

## 2022-03-03 PROCEDURE — 19081 BX BREAST 1ST LESION STRTCTC: CPT | Mod: LT

## 2022-03-03 PROCEDURE — 19081 BX BREAST 1ST LESION STRTCTC: CPT

## 2022-03-03 PROCEDURE — A4648: CPT

## 2022-03-03 PROCEDURE — 88305 TISSUE EXAM BY PATHOLOGIST: CPT

## 2022-03-07 ENCOUNTER — NON-APPOINTMENT (OUTPATIENT)
Age: 62
End: 2022-03-07

## 2022-03-31 ENCOUNTER — NON-APPOINTMENT (OUTPATIENT)
Age: 62
End: 2022-03-31

## 2022-03-31 ENCOUNTER — APPOINTMENT (OUTPATIENT)
Dept: CARDIOLOGY | Facility: CLINIC | Age: 62
End: 2022-03-31
Payer: MEDICAID

## 2022-03-31 VITALS
SYSTOLIC BLOOD PRESSURE: 126 MMHG | HEIGHT: 64 IN | DIASTOLIC BLOOD PRESSURE: 83 MMHG | OXYGEN SATURATION: 100 % | HEART RATE: 83 BPM | WEIGHT: 147 LBS | BODY MASS INDEX: 25.1 KG/M2

## 2022-03-31 LAB
25(OH)D3 SERPL-MCNC: 20.3 NG/ML
ALBUMIN SERPL ELPH-MCNC: 5 G/DL
ALP BLD-CCNC: 105 U/L
ALT SERPL-CCNC: 21 U/L
ANION GAP SERPL CALC-SCNC: 12 MMOL/L
AST SERPL-CCNC: 16 U/L
BASOPHILS # BLD AUTO: 0.05 K/UL
BASOPHILS NFR BLD AUTO: 1.1 %
BILIRUB SERPL-MCNC: <0.2 MG/DL
BUN SERPL-MCNC: 14 MG/DL
CALCIUM SERPL-MCNC: 10.2 MG/DL
CHLORIDE SERPL-SCNC: 105 MMOL/L
CHOLEST SERPL-MCNC: 254 MG/DL
CO2 SERPL-SCNC: 25 MMOL/L
CREAT SERPL-MCNC: 0.67 MG/DL
EGFR: 98 ML/MIN/1.73M2
EOSINOPHIL # BLD AUTO: 0.22 K/UL
EOSINOPHIL NFR BLD AUTO: 4.7 %
ESTIMATED AVERAGE GLUCOSE: 126 MG/DL
GLUCOSE SERPL-MCNC: 84 MG/DL
HBA1C MFR BLD HPLC: 6 %
HCT VFR BLD CALC: 37.2 %
HDLC SERPL-MCNC: 59 MG/DL
HGB BLD-MCNC: 11.8 G/DL
IMM GRANULOCYTES NFR BLD AUTO: 0 %
LDLC SERPL CALC-MCNC: 160 MG/DL
LYMPHOCYTES # BLD AUTO: 1.42 K/UL
LYMPHOCYTES NFR BLD AUTO: 30.1 %
MAN DIFF?: NORMAL
MCHC RBC-ENTMCNC: 28.9 PG
MCHC RBC-ENTMCNC: 31.7 GM/DL
MCV RBC AUTO: 91 FL
MONOCYTES # BLD AUTO: 0.41 K/UL
MONOCYTES NFR BLD AUTO: 8.7 %
NEUTROPHILS # BLD AUTO: 2.61 K/UL
NEUTROPHILS NFR BLD AUTO: 55.4 %
NONHDLC SERPL-MCNC: 195 MG/DL
PLATELET # BLD AUTO: 308 K/UL
POTASSIUM SERPL-SCNC: 4.6 MMOL/L
PROT SERPL-MCNC: 7.7 G/DL
RBC # BLD: 4.09 M/UL
RBC # FLD: 13.4 %
SODIUM SERPL-SCNC: 142 MMOL/L
TRIGL SERPL-MCNC: 173 MG/DL
TSH SERPL-ACNC: 2.31 UIU/ML
VIT B12 SERPL-MCNC: 577 PG/ML
WBC # FLD AUTO: 4.71 K/UL

## 2022-03-31 PROCEDURE — 93000 ELECTROCARDIOGRAM COMPLETE: CPT

## 2022-03-31 PROCEDURE — 99213 OFFICE O/P EST LOW 20 MIN: CPT

## 2022-03-31 NOTE — DISCUSSION/SUMMARY
[FreeTextEntry1] : The patient is a 61-year-old female hypothyroid, cervical spine disease, fibromyalgia with atypical chest pain\par #1 CV- echo normal and exercise stress test ordered\par #2 C-spine- f/u neuro\par #3 General- fibromyalgia may be cause of symptoms, labs today secondary to recent fatigue.

## 2022-03-31 NOTE — HISTORY OF PRESENT ILLNESS
[FreeTextEntry1] : Jaylin is not feeling well. She has a lot of pain in cervical spine and radiates around. Waiting for neuro appt. Going for PT for right knee. Her brother just had CABG and other brother had CABG years ago. She continues to have chest pains which she thinks is fibromyalgia. Some SOB on exertion. No PND, orthopnea, dizziness or lightheadedness.

## 2022-04-02 ENCOUNTER — RX RENEWAL (OUTPATIENT)
Age: 62
End: 2022-04-02

## 2022-04-26 ENCOUNTER — APPOINTMENT (OUTPATIENT)
Dept: CV DIAGNOSTICS | Facility: HOSPITAL | Age: 62
End: 2022-04-26

## 2022-04-26 ENCOUNTER — OUTPATIENT (OUTPATIENT)
Dept: OUTPATIENT SERVICES | Facility: HOSPITAL | Age: 62
LOS: 1 days | End: 2022-04-26
Payer: MEDICAID

## 2022-04-26 DIAGNOSIS — R07.9 CHEST PAIN, UNSPECIFIED: ICD-10-CM

## 2022-04-26 DIAGNOSIS — S09.91XA UNSPECIFIED INJURY OF EAR, INITIAL ENCOUNTER: Chronic | ICD-10-CM

## 2022-04-26 DIAGNOSIS — R07.89 OTHER CHEST PAIN: ICD-10-CM

## 2022-04-26 DIAGNOSIS — Z98.891 HISTORY OF UTERINE SCAR FROM PREVIOUS SURGERY: Chronic | ICD-10-CM

## 2022-04-26 PROCEDURE — 93018 CV STRESS TEST I&R ONLY: CPT

## 2022-04-26 PROCEDURE — 93016 CV STRESS TEST SUPVJ ONLY: CPT

## 2022-04-26 PROCEDURE — 93017 CV STRESS TEST TRACING ONLY: CPT

## 2022-05-09 ENCOUNTER — APPOINTMENT (OUTPATIENT)
Dept: NEUROLOGY | Facility: CLINIC | Age: 62
End: 2022-05-09
Payer: MEDICAID

## 2022-05-09 VITALS
WEIGHT: 140 LBS | OXYGEN SATURATION: 98 % | TEMPERATURE: 97.6 F | HEIGHT: 64 IN | BODY MASS INDEX: 23.9 KG/M2 | SYSTOLIC BLOOD PRESSURE: 132 MMHG | DIASTOLIC BLOOD PRESSURE: 86 MMHG | HEART RATE: 80 BPM

## 2022-05-09 DIAGNOSIS — M54.81 OCCIPITAL NEURALGIA: ICD-10-CM

## 2022-05-09 PROCEDURE — 99204 OFFICE O/P NEW MOD 45 MIN: CPT

## 2022-05-09 NOTE — ASSESSMENT
[FreeTextEntry1] : 60 yo woman with unilateral right sided headaches.  Suspect cervicogenic headaches and right occipital neuralgia, however, should rule out structural cause due to unilaterality.  Also check ESR/CRP.\par \par Plan:\par 1. MRI brain\par 2. ESR, CRP\par 3. Consult Dr Stewart for right occipital nerve block\par 4. Patient agrees with plan.\par 5. Follow up after testing completed.\par \par Osbaldo Brown MD\par [facility]\par \par Greater than 50% of the encounter was spent on counseling and coordination of care discussing differential diagnosis, diagnostic testing, and treatment options. We have talked about appropriate follow up, and I have spent 45 minutes of face to face time with the patient.\par

## 2022-05-09 NOTE — HISTORY OF PRESENT ILLNESS
[FreeTextEntry1] : 62 yo woman reporting chronic headaches and dizziness.  She had COVID infection last year in December and since then has been getting frequent headaches.  The headache in the right temporal and occipital region, sometimes associated with right facial numbness and blurred vision.  The pain is a pressure sensation but can be an electrical sharp sensation in the occipital region.  She gets the headache intermittently throughout the day, it can be mild or severe.  She has chronic neck pain from cervical spondylosis, associated with cervicogenic headaches as well.  The dizziness she describes as lightheadedness, often triggered by sitting up or standing up quickly.  No history of syncope.  She feels she has not been drinking enough water lately.

## 2022-05-09 NOTE — PHYSICAL EXAM
[FreeTextEntry1] : No temporal artery induration or tenderness\par Awake, alert, oriented x 3.  Language fluent.  Comprehension intact.  Naming intact.  Repetition intact.  Affect normal.  Cranial nerves grossly intact.  Motor exam: normal bulk, normal tone, 5/5 in all four extremities.  No tremors or fasciculations.  Sensory exam: intact to LT/PP/ARIANA/vibration.  DTRs: 2+ throughout, flexor plantar response bilaterally, no clonus.  Coordination: no dysmetria.  Gait: normal toe/heel/tandem gait.  Romberg - negative

## 2022-05-20 LAB
CRP SERPL-MCNC: <3 MG/L
ERYTHROCYTE [SEDIMENTATION RATE] IN BLOOD BY WESTERGREN METHOD: 13 MM/HR

## 2022-06-03 ENCOUNTER — APPOINTMENT (OUTPATIENT)
Dept: MRI IMAGING | Facility: CLINIC | Age: 62
End: 2022-06-03

## 2022-06-05 ENCOUNTER — APPOINTMENT (OUTPATIENT)
Dept: MRI IMAGING | Facility: IMAGING CENTER | Age: 62
End: 2022-06-05

## 2022-07-01 ENCOUNTER — APPOINTMENT (OUTPATIENT)
Dept: MRI IMAGING | Facility: CLINIC | Age: 62
End: 2022-07-01

## 2022-07-01 ENCOUNTER — OUTPATIENT (OUTPATIENT)
Dept: OUTPATIENT SERVICES | Facility: HOSPITAL | Age: 62
LOS: 1 days | End: 2022-07-01
Payer: MEDICAID

## 2022-07-01 DIAGNOSIS — R51.9 HEADACHE, UNSPECIFIED: ICD-10-CM

## 2022-07-01 DIAGNOSIS — S09.91XA UNSPECIFIED INJURY OF EAR, INITIAL ENCOUNTER: Chronic | ICD-10-CM

## 2022-07-01 DIAGNOSIS — Z00.8 ENCOUNTER FOR OTHER GENERAL EXAMINATION: ICD-10-CM

## 2022-07-01 DIAGNOSIS — Z98.891 HISTORY OF UTERINE SCAR FROM PREVIOUS SURGERY: Chronic | ICD-10-CM

## 2022-07-01 PROCEDURE — 70551 MRI BRAIN STEM W/O DYE: CPT

## 2022-07-01 PROCEDURE — 70551 MRI BRAIN STEM W/O DYE: CPT | Mod: 26

## 2022-07-13 ENCOUNTER — OUTPATIENT (OUTPATIENT)
Dept: OUTPATIENT SERVICES | Facility: HOSPITAL | Age: 62
LOS: 1 days | End: 2022-07-13
Payer: MEDICAID

## 2022-07-13 ENCOUNTER — MED ADMIN CHARGE (OUTPATIENT)
Age: 62
End: 2022-07-13

## 2022-07-13 ENCOUNTER — APPOINTMENT (OUTPATIENT)
Dept: INTERNAL MEDICINE | Facility: CLINIC | Age: 62
End: 2022-07-13

## 2022-07-13 VITALS
OXYGEN SATURATION: 99 % | SYSTOLIC BLOOD PRESSURE: 130 MMHG | DIASTOLIC BLOOD PRESSURE: 72 MMHG | WEIGHT: 136 LBS | BODY MASS INDEX: 23.22 KG/M2 | HEIGHT: 64 IN | HEART RATE: 86 BPM

## 2022-07-13 DIAGNOSIS — S09.91XA UNSPECIFIED INJURY OF EAR, INITIAL ENCOUNTER: Chronic | ICD-10-CM

## 2022-07-13 DIAGNOSIS — H04.202 UNSPECIFIED EPIPHORA, LEFT SIDE: ICD-10-CM

## 2022-07-13 DIAGNOSIS — H57.11 OCULAR PAIN, RIGHT EYE: ICD-10-CM

## 2022-07-13 DIAGNOSIS — R10.13 EPIGASTRIC PAIN: ICD-10-CM

## 2022-07-13 DIAGNOSIS — I10 ESSENTIAL (PRIMARY) HYPERTENSION: ICD-10-CM

## 2022-07-13 DIAGNOSIS — Z87.898 PERSONAL HISTORY OF OTHER SPECIFIED CONDITIONS: ICD-10-CM

## 2022-07-13 DIAGNOSIS — Z86.69 PERSONAL HISTORY OF OTHER DISEASES OF THE NERVOUS SYSTEM AND SENSE ORGANS: ICD-10-CM

## 2022-07-13 DIAGNOSIS — R20.0 ANESTHESIA OF SKIN: ICD-10-CM

## 2022-07-13 DIAGNOSIS — Z11.59 ENCOUNTER FOR SCREENING FOR OTHER VIRAL DISEASES: ICD-10-CM

## 2022-07-13 DIAGNOSIS — R06.02 SHORTNESS OF BREATH: ICD-10-CM

## 2022-07-13 DIAGNOSIS — M79.7 FIBROMYALGIA: ICD-10-CM

## 2022-07-13 DIAGNOSIS — H54.7 UNSPECIFIED VISUAL LOSS: ICD-10-CM

## 2022-07-13 DIAGNOSIS — T73.3XXA EXHAUSTION DUE TO EXCESSIVE EXERTION, INITIAL ENCOUNTER: ICD-10-CM

## 2022-07-13 DIAGNOSIS — Z98.891 HISTORY OF UTERINE SCAR FROM PREVIOUS SURGERY: Chronic | ICD-10-CM

## 2022-07-13 DIAGNOSIS — M25.572 PAIN IN LEFT ANKLE AND JOINTS OF LEFT FOOT: ICD-10-CM

## 2022-07-13 DIAGNOSIS — Z91.81 HISTORY OF FALLING: ICD-10-CM

## 2022-07-13 DIAGNOSIS — J06.9 ACUTE UPPER RESPIRATORY INFECTION, UNSPECIFIED: ICD-10-CM

## 2022-07-13 DIAGNOSIS — M25.522 PAIN IN LEFT ELBOW: ICD-10-CM

## 2022-07-13 DIAGNOSIS — Z12.4 ENCOUNTER FOR SCREENING FOR MALIGNANT NEOPLASM OF CERVIX: ICD-10-CM

## 2022-07-13 PROCEDURE — 84443 ASSAY THYROID STIM HORMONE: CPT

## 2022-07-13 PROCEDURE — 80061 LIPID PANEL: CPT

## 2022-07-13 PROCEDURE — G0463: CPT

## 2022-07-13 PROCEDURE — 80048 BASIC METABOLIC PNL TOTAL CA: CPT

## 2022-07-13 PROCEDURE — 99214 OFFICE O/P EST MOD 30 MIN: CPT | Mod: GC

## 2022-07-14 LAB
ANION GAP SERPL CALC-SCNC: 15 MMOL/L
BUN SERPL-MCNC: 13 MG/DL
CALCIUM SERPL-MCNC: 9.8 MG/DL
CHLORIDE SERPL-SCNC: 104 MMOL/L
CHOLEST SERPL-MCNC: 161 MG/DL
CO2 SERPL-SCNC: 24 MMOL/L
CREAT SERPL-MCNC: 0.68 MG/DL
EGFR: 98 ML/MIN/1.73M2
GLUCOSE SERPL-MCNC: 83 MG/DL
HDLC SERPL-MCNC: 56 MG/DL
LDLC SERPL CALC-MCNC: 81 MG/DL
NONHDLC SERPL-MCNC: 105 MG/DL
POTASSIUM SERPL-SCNC: 5.1 MMOL/L
SODIUM SERPL-SCNC: 142 MMOL/L
TRIGL SERPL-MCNC: 118 MG/DL
TSH SERPL-ACNC: 1.36 UIU/ML

## 2022-07-15 PROBLEM — R20.0 NUMBNESS OF LEFT THUMB: Status: ACTIVE | Noted: 2022-07-15

## 2022-07-15 NOTE — ASSESSMENT
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 \par \par # L thumb Loss of sensation\par Pt reports she cut her L hand with a knife one month ago and reports a loss of sensation on the tip of the L thumb. Pt reports numbness a few days after the cut on her hand. Pt reports swelling in L thumb 2-3 times a week when carrying heaving objects. Pt reports she cannot lift a gallon of milk. Pt reports increased pain in L thumb at site of injury. Pt reports taking Tylenol for hand pain, but no relief. Pt reports increasing difficulty flexing L 5th digit when making a fist. Normal strength lindsay.\par \par # HCM\par Pt reports that she needs to get a mammogram in September after a breast biopsy that was done on March 3, 2022\par \par #Fibromyalgia\par Pt reports that her fibromyalgia has worsened and reports decreased ROM when elevating linsday arms and shoulders. Pt reports increased pain in R medial knee and requests a rheumatology referral. Pt reports taking Tylenol with no relief in sx\par \par #Lower back pain\par Pt reports increased intermittent LBP unrelated to her knee pain. Denies any radiation down pt's leg. Neg straight leg test\par \par #Depression\par Pt became tearful when asked about her depression and anxiety. Pt reports that the majority of her mental health has been managed via telehealth and reports that she prefers an in-person appointment vs a telehealth/phone visit. \par \par #Reduced vision in R eye\par Pt reports increased difficulty seeing out of her R eye. \par \par #HCM\par - Pt referral for colonoscopy sent 7/13/22\par - DEXA referral sent 7/13/22\par - Shingrix 0.5mg IM 7/13/22 (1st dose received on 5/19/22 per pt)\par - mammogram referral sent 7/13/22

## 2022-07-15 NOTE — PHYSICAL EXAM
[No Joint Swelling] : no joint swelling [Grossly Normal Strength/Tone] : grossly normal strength/tone [Normal] : affect was normal and insight and judgment were intact [de-identified] : Pain on palpation of R medial knee [de-identified] : Nodule from injury on L thumb [de-identified] : Reduced sensation on L thumb

## 2022-07-15 NOTE — REVIEW OF SYSTEMS
[Redness] : redness [Hearing Loss] : hearing loss [Joint Pain] : joint pain [Muscle Pain] : muscle pain [Back Pain] : back pain [Anxiety] : anxiety [Depression] : depression [Negative] : Heme/Lymph [Earache] : no earache [Nosebleed] : no nosebleeds [Sore Throat] : no sore throat [Suicidal] : not suicidal [FreeTextEntry3] : Reports R lazy eye. Reports trouble seeing occasionally. Reports redness in eyes after shower. [FreeTextEntry4] : Reports reduced hearing L ear [FreeTextEntry9] : R knee joint pain

## 2022-07-15 NOTE — HISTORY OF PRESENT ILLNESS
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  Pt reports she went to a cardiologist and had a stress test with normal results. Pt reports blood test at that cardiology appointment showed elevated cholesterol and pre-diabetes. Pt reports that her main concern today is that she needs a blood test today to follow up with her cholesterol, blood sugar, and thyroid hormone levels. \par \par # L thumb Loss of sensation\par Pt reports she cut her L hand with a knife one month ago and reports a loss of sensation on the tip of the L thumb. Pt reports numbness a few days after the cut on her hand. Pt reports swelling in L thumb 2-3 times a week when carrying heaving objects. Pt reports she cannot lift a gallon of milk. Pt reports increased pain in L thumb at site of injury. Pt reports taking Tylenol for hand pain, but no relief. Pt reports increasing difficulty flexing L 5th digit when making a fist\par \par # HCM\par Pt reports that she needs to get a mammogram in September after a breast biopsy that was done on March 3, 2022\par \par #Fibromyalgia\par Pt reports that her fibromyalgia has worsened and reports decreased ROM when elevating lindsay arms and shoulders. Pt reports increased pain in R medial knee and requests a rheumatology referral. Pt reports taking Tylenol with no relief in sx\par \par #Lower back pain\par Pt reports increased intermittent LBP unrelated to her knee pain. Denies any radiation down pt's leg.\par \par #Depression\par Pt became tearful when asked about her depression and anxiety. Pt reports that the majority of her mental health has been managed via telehealth and reports that she prefers an in-person appointment vs a telehealth/phone visit. \par \par #Reduced vision in R eye\par Pt reports increased difficulty seeing out of her R eye. \par \par #HCM\par - Pt referral for colonoscopy sent 7/13/22\par - DEXA referral sent 7/13/22\par - Shingrix 0.5mg IM 7/13/22 (1st dose received on 5/19/22 per pt)\par - mammogram referral sent 7/13/22

## 2022-07-22 DIAGNOSIS — R20.0 ANESTHESIA OF SKIN: ICD-10-CM

## 2022-07-22 DIAGNOSIS — M79.7 FIBROMYALGIA: ICD-10-CM

## 2022-07-29 ENCOUNTER — NON-APPOINTMENT (OUTPATIENT)
Age: 62
End: 2022-07-29

## 2022-08-02 ENCOUNTER — TRANSCRIPTION ENCOUNTER (OUTPATIENT)
Age: 62
End: 2022-08-02

## 2022-08-19 ENCOUNTER — NON-APPOINTMENT (OUTPATIENT)
Age: 62
End: 2022-08-19

## 2022-08-19 ENCOUNTER — APPOINTMENT (OUTPATIENT)
Dept: OPHTHALMOLOGY | Facility: CLINIC | Age: 62
End: 2022-08-19

## 2022-08-19 ENCOUNTER — APPOINTMENT (OUTPATIENT)
Dept: INTERNAL MEDICINE | Facility: CLINIC | Age: 62
End: 2022-08-19

## 2022-08-19 PROCEDURE — 92012 INTRM OPH EXAM EST PATIENT: CPT

## 2022-08-24 ENCOUNTER — OUTPATIENT (OUTPATIENT)
Dept: OUTPATIENT SERVICES | Facility: HOSPITAL | Age: 62
LOS: 1 days | Discharge: HOME | End: 2022-08-24

## 2022-08-24 DIAGNOSIS — S09.91XA UNSPECIFIED INJURY OF EAR, INITIAL ENCOUNTER: Chronic | ICD-10-CM

## 2022-08-24 DIAGNOSIS — Z98.891 HISTORY OF UTERINE SCAR FROM PREVIOUS SURGERY: Chronic | ICD-10-CM

## 2022-08-24 PROCEDURE — 90839 PSYTX CRISIS INITIAL 60 MIN: CPT | Mod: 95

## 2022-08-26 ENCOUNTER — NON-APPOINTMENT (OUTPATIENT)
Age: 62
End: 2022-08-26

## 2022-09-09 DIAGNOSIS — F41.9 ANXIETY DISORDER, UNSPECIFIED: ICD-10-CM

## 2022-09-12 ENCOUNTER — OUTPATIENT (OUTPATIENT)
Dept: OUTPATIENT SERVICES | Facility: HOSPITAL | Age: 62
LOS: 1 days | End: 2022-09-12
Payer: MEDICAID

## 2022-09-12 ENCOUNTER — NON-APPOINTMENT (OUTPATIENT)
Age: 62
End: 2022-09-12

## 2022-09-12 ENCOUNTER — APPOINTMENT (OUTPATIENT)
Dept: OPHTHALMOLOGY | Facility: CLINIC | Age: 62
End: 2022-09-12

## 2022-09-12 ENCOUNTER — APPOINTMENT (OUTPATIENT)
Dept: INTERNAL MEDICINE | Facility: CLINIC | Age: 62
End: 2022-09-12

## 2022-09-12 VITALS
HEIGHT: 64 IN | WEIGHT: 127 LBS | SYSTOLIC BLOOD PRESSURE: 136 MMHG | BODY MASS INDEX: 21.68 KG/M2 | DIASTOLIC BLOOD PRESSURE: 80 MMHG | OXYGEN SATURATION: 98 % | HEART RATE: 88 BPM

## 2022-09-12 DIAGNOSIS — Z98.891 HISTORY OF UTERINE SCAR FROM PREVIOUS SURGERY: Chronic | ICD-10-CM

## 2022-09-12 DIAGNOSIS — I10 ESSENTIAL (PRIMARY) HYPERTENSION: ICD-10-CM

## 2022-09-12 DIAGNOSIS — S09.91XA UNSPECIFIED INJURY OF EAR, INITIAL ENCOUNTER: Chronic | ICD-10-CM

## 2022-09-12 PROCEDURE — 68761 CLOSE TEAR DUCT OPENING: CPT | Mod: E4,E2

## 2022-09-12 PROCEDURE — 90688 IIV4 VACCINE SPLT 0.5 ML IM: CPT

## 2022-09-12 PROCEDURE — 92012 INTRM OPH EXAM EST PATIENT: CPT | Mod: 25

## 2022-09-12 PROCEDURE — 92015 DETERMINE REFRACTIVE STATE: CPT | Mod: NC

## 2022-09-12 PROCEDURE — G0463: CPT | Mod: 25

## 2022-09-12 PROCEDURE — G0008: CPT

## 2022-09-12 PROCEDURE — T1013: CPT

## 2022-09-12 PROCEDURE — 99215 OFFICE O/P EST HI 40 MIN: CPT | Mod: GC

## 2022-09-12 NOTE — REVIEW OF SYSTEMS
[Fatigue] : fatigue [Recent Change In Weight] : ~T recent weight change [Joint Pain] : joint pain [Headache] : headache [Insomnia] : insomnia [Anxiety] : anxiety [Depression] : depression [Negative] : Musculoskeletal

## 2022-09-14 ENCOUNTER — TRANSCRIPTION ENCOUNTER (OUTPATIENT)
Age: 62
End: 2022-09-14

## 2022-09-15 ENCOUNTER — APPOINTMENT (OUTPATIENT)
Dept: NEUROLOGY | Facility: CLINIC | Age: 62
End: 2022-09-15

## 2022-09-15 NOTE — INTERPRETER SERVICES
[[employer] ] : provided by [employer]   [Time Spent: ____ minutes] : Total time spent using  services: [unfilled] minutes. The patient's primary language is not English thus required  services. [Interpreters_IDNumber] : [id number] [Interpreters_FullName] : Dannielle [TWNoteComboBox1] : Somali

## 2022-09-15 NOTE — ASSESSMENT
[FreeTextEntry1] : Patient is 62 yo F with a history of hypothyroidism, depression, anxiety, OA s/p anterior discectomy c/b post op monica syndrome who presents for a CPE. She is primarily concerned about receiving her psychiatric medications\par \par #Depression\par #Anxiety\par - patient with signs of worsening depression/anxiety in the setting of lack of access to medications: weight loss, anhedonia, irritability, etc\par - patient reportedly takes diazepam, zolpidem, fluoxetine but ran out in July\par - psychology/psychiatry referral for medication evaluation\par \par #hypothyroidism\par - TSH 7/2022 WNL\par \par #HLD\par - Lipid profile 7/2022 WNL\par - c/w lipitor \par \par #HCM\par - UTD on all screening: colonoscopy, DEXA, mammo referrals all given on 7/13/2022\par - 1st dose of the shingrix vaccine given 7/22: not due for 2nd dose yet, can get it next visit\par - flu shot today, otherwise UTD on vaccines\par - RTC in 10 weeks

## 2022-09-15 NOTE — HEALTH RISK ASSESSMENT
[Good] : ~his/her~ current health as good [Poor] : ~his/her~ mood as  poor [Never] : Never [No falls in past year] : Patient reported no falls in the past year [3] : 2) Feeling down, depressed, or hopeless for nearly every day (3) [PHQ-2 Positive] : PHQ-2 Positive [PHQ-9 Positive] : PHQ-9 Positive [Patient reported bone density results were abnormal] : Patient reported bone density results were abnormal [Patient reported colonoscopy was normal] : Patient reported colonoscopy was normal [Access to Medications] : access to medications [With Family] : lives with family [Unemployed] : unemployed [Feels Safe at Home] : Feels safe at home [No] : No [FreeTextEntry1] : access to medications [de-identified] : goes for walks occasionally [[id number]] : 6 [Change in mental status noted] : No change in mental status noted [Language] : denies difficulty with language

## 2022-09-15 NOTE — END OF VISIT
[] : Resident [Time Spent: ___ minutes] : I have spent [unfilled] minutes of time on the encounter. [FreeTextEntry3] : pt initiated care in this clinic bringing in a long hx of prior psych dx and rx . she continues of fluoxetine but reports persistent depressive sxs, anxiety and objective weight loss over past 6 months. she requires eval by Mental Health services and management of her sxs. close f/u with clear communication is needed ( pt confirms her cell # and urged to answer phone )

## 2022-09-15 NOTE — HISTORY OF PRESENT ILLNESS
[FreeTextEntry1] : CPE [de-identified] : Patient is a 61 year old F with a history of hypothyroidism, osteoarthritis s/p C4-C7 anterior cervical discectomy and fusion c/b post op monica syndrome with cervical radiculopathy, anxiety, depression, prediabetes, HLD who presents for a CPE. The patient reports that she has no acute complaints regarding her health but is very concerned about her psychiatric medications. She reports taking diazepam, ambien, and fluoxetine for her anxiety, depression, and insomnia until July. She had previously been seeing a psychiatrist regularly who had been prescribing her these medications but he no longer takes her insurance. She was prescribed a 3 month supply of her medications in June but ran out by July because of a miscommunication between her and her daughter that resulted in the medications being accidentally thrown out. She continues to take fluoxetine, but has not been prescribed diazepam and ambien. She has struggled to find a psychiatrist since then, despite multiple attempts by our clinic's providers to refer her to [facility] or our psychology team. The patient reports worsening symptoms of her depression and anxiety since she has stopped taking her medications including decreased appetite, weight loss, anhedonia, fatigue, feelings of hopelessness, and insomnia. The patient is amenable to psychologist referral and is willing to try to call [facility]. The patient denies any suicidal ideation, and reports feeling safe at home. She reports that all of her medical issues are stable and he has no acute complaints at this time.\par

## 2022-09-15 NOTE — PHYSICAL EXAM
[Normal] : normal gait, coordination grossly intact, no focal deficits and deep tendon reflexes were 2+ and symmetric [de-identified] : L eye constricted pupil

## 2022-09-16 DIAGNOSIS — F32.A DEPRESSION, UNSPECIFIED: ICD-10-CM

## 2022-09-16 DIAGNOSIS — F41.9 ANXIETY DISORDER, UNSPECIFIED: ICD-10-CM

## 2022-09-16 DIAGNOSIS — Z23 ENCOUNTER FOR IMMUNIZATION: ICD-10-CM

## 2022-09-19 ENCOUNTER — RESULT REVIEW (OUTPATIENT)
Age: 62
End: 2022-09-19

## 2022-09-19 ENCOUNTER — APPOINTMENT (OUTPATIENT)
Dept: MAMMOGRAPHY | Facility: CLINIC | Age: 62
End: 2022-09-19

## 2022-09-19 PROCEDURE — G0279: CPT | Mod: 26

## 2022-09-19 PROCEDURE — 77066 DX MAMMO INCL CAD BI: CPT | Mod: 26

## 2022-09-23 ENCOUNTER — OUTPATIENT (OUTPATIENT)
Dept: OUTPATIENT SERVICES | Facility: HOSPITAL | Age: 62
LOS: 1 days | End: 2022-09-23

## 2022-09-23 ENCOUNTER — APPOINTMENT (OUTPATIENT)
Dept: ULTRASOUND IMAGING | Facility: CLINIC | Age: 62
End: 2022-09-23

## 2022-09-23 ENCOUNTER — RESULT REVIEW (OUTPATIENT)
Age: 62
End: 2022-09-23

## 2022-09-23 DIAGNOSIS — S09.91XA UNSPECIFIED INJURY OF EAR, INITIAL ENCOUNTER: Chronic | ICD-10-CM

## 2022-09-23 DIAGNOSIS — Z98.891 HISTORY OF UTERINE SCAR FROM PREVIOUS SURGERY: Chronic | ICD-10-CM

## 2022-09-23 DIAGNOSIS — Z00.8 ENCOUNTER FOR OTHER GENERAL EXAMINATION: ICD-10-CM

## 2022-09-23 PROCEDURE — 76642 ULTRASOUND BREAST LIMITED: CPT | Mod: 26,LT

## 2022-09-29 ENCOUNTER — APPOINTMENT (OUTPATIENT)
Dept: GASTROENTEROLOGY | Facility: CLINIC | Age: 62
End: 2022-09-29

## 2022-10-07 ENCOUNTER — RX CHANGE (OUTPATIENT)
Age: 62
End: 2022-10-07

## 2022-10-14 ENCOUNTER — APPOINTMENT (OUTPATIENT)
Dept: OPHTHALMOLOGY | Facility: CLINIC | Age: 62
End: 2022-10-14

## 2022-12-14 PROBLEM — R07.89 ATYPICAL CHEST PAIN: Status: ACTIVE | Noted: 2022-12-14

## 2022-12-29 ENCOUNTER — APPOINTMENT (OUTPATIENT)
Dept: INTERNAL MEDICINE | Facility: CLINIC | Age: 62
End: 2022-12-29
Payer: MEDICAID

## 2022-12-29 ENCOUNTER — OUTPATIENT (OUTPATIENT)
Dept: OUTPATIENT SERVICES | Facility: HOSPITAL | Age: 62
LOS: 1 days | End: 2022-12-29
Payer: MEDICAID

## 2022-12-29 VITALS
HEIGHT: 64 IN | OXYGEN SATURATION: 99 % | HEART RATE: 95 BPM | DIASTOLIC BLOOD PRESSURE: 80 MMHG | SYSTOLIC BLOOD PRESSURE: 126 MMHG

## 2022-12-29 DIAGNOSIS — Z98.891 HISTORY OF UTERINE SCAR FROM PREVIOUS SURGERY: Chronic | ICD-10-CM

## 2022-12-29 DIAGNOSIS — I10 ESSENTIAL (PRIMARY) HYPERTENSION: ICD-10-CM

## 2022-12-29 DIAGNOSIS — S09.91XA UNSPECIFIED INJURY OF EAR, INITIAL ENCOUNTER: Chronic | ICD-10-CM

## 2022-12-29 PROCEDURE — 99213 OFFICE O/P EST LOW 20 MIN: CPT | Mod: GE

## 2022-12-29 PROCEDURE — G0463: CPT

## 2022-12-29 RX ORDER — DIAZEPAM 5 MG/1
5 TABLET ORAL
Qty: 30 | Refills: 0 | Status: DISCONTINUED | COMMUNITY
Start: 2017-02-01 | End: 2022-12-29

## 2022-12-29 RX ORDER — FLUOXETINE HYDROCHLORIDE 20 MG/1
20 CAPSULE ORAL TWICE DAILY
Qty: 30 | Refills: 0 | Status: DISCONTINUED | COMMUNITY
Start: 2018-06-21 | End: 2022-12-29

## 2022-12-29 RX ORDER — ACETAMINOPHEN 325 MG/1
325 TABLET, FILM COATED ORAL EVERY 6 HOURS
Qty: 240 | Refills: 0 | Status: ACTIVE | COMMUNITY
Start: 2022-12-29 | End: 1900-01-01

## 2023-01-03 NOTE — PHYSICAL EXAM
[No Edema] : there was no peripheral edema [Normal] : normal gait, coordination grossly intact, no focal deficits and deep tendon reflexes were 2+ and symmetric [de-identified] : reproducible chest pain on palpation  [de-identified] : cervical and neck tenderness  [de-identified] : 5/5 strength in upper and lower extremities, sensation intact

## 2023-01-03 NOTE — ASSESSMENT
[FreeTextEntry1] : 62 year F with PMH hypothyroidism, depression/anxiety, OA s/p anterior discectomy c/b post-op monica syndrome presenting with cervical back pain\par \par #cervical back pain\par - has seen neuro and cards and has had non-ischemic stress test, MR head with no acute changes, and cervical xray with minimal anterolisthesis and no changes in hardware \par - has had CRP, ESr, TSH wnl\par - ddx: musuclar pain vs fibromyalgia vs other; also think untreated depression/anxiety is component \par - rx duloxetine 30mg for 1 week and increase to 60mg afterwards; counselled on SE like GI upset and SI\par - rx diclofenac gel and lidocaine patch \par - PT referral \par \par #depression\par - has not been taking medication for month\par - has appointment with psychologist next month \par - rx duloxetine \par \par Case d/w Dr. Evans Mejia\par \par RTC in 1mo

## 2023-01-03 NOTE — HISTORY OF PRESENT ILLNESS
[de-identified] : 62 year F with PMH hypothyroidism, depression/anxiety, OA s/p anterior discectomy c/b post-op monica syndrome presenting with cervical back pain\par \par Reports always having cervical back pain that radiates to her head but has gotten worse over the last several months. Also reports numbness/tingling in her head when she gets the pain and chest pain that is reproducible when she palpates herself. Pain is constant with no relieving factors; has tried tylenol with no relief. Pain worsens when she lifts heavy objects. Denies numbness/tingling in her arms. \par \par Had cervical disectomy in 2017.

## 2023-01-03 NOTE — END OF VISIT
[] : Resident [FreeTextEntry3] : 63yo F with PMhx of hypothyroidism who presents for chronic neck/ head/ chest pain. Has seen multiple specialists including Cardiology, Neurology, and PMR for her symptoms without significant benefit. PHQ9 also elevated today. favor trial of duloxetine for dual chronic pain and mood benefit. Has f/u with Psych (unclear if psychologist or prescriber). Close f/u given med initiaiton.

## 2023-01-03 NOTE — HEALTH RISK ASSESSMENT
[Nearly Every Day (3)] : 4.) Feeling tired or having little energy? Nearly every day [Several Days (1)] : 8.) Moving or speaking so slowly that other people could have noticed, or the opposite, moving or speaking faster than usual? Several days [1/2 of Days or More (2)] : 7.) Trouble concentrating on things, such as reading a newspaper or watching television? Half the days or more [Moderately Severe] : severity of depression is moderately severe [Extremely Difficult] : How difficult have these problems made it for you to do your work, take care of things at home, or get along with people? Extremely difficult [[id number]] : 17

## 2023-01-03 NOTE — INTERPRETER SERVICES
[Patient Declined  Services] : - None: Patient declined  services [FreeTextEntry3] : Pt preferred I speak Turkmen\par

## 2023-01-05 DIAGNOSIS — F32.A DEPRESSION, UNSPECIFIED: ICD-10-CM

## 2023-01-05 DIAGNOSIS — M54.2 CERVICALGIA: ICD-10-CM

## 2023-01-28 ENCOUNTER — EMERGENCY (EMERGENCY)
Facility: HOSPITAL | Age: 63
LOS: 1 days | Discharge: HOME | End: 2023-01-28
Attending: EMERGENCY MEDICINE
Payer: MEDICAID

## 2023-01-28 VITALS
TEMPERATURE: 98 F | DIASTOLIC BLOOD PRESSURE: 87 MMHG | WEIGHT: 134.04 LBS | HEART RATE: 86 BPM | HEIGHT: 62 IN | OXYGEN SATURATION: 100 % | RESPIRATION RATE: 20 BRPM | SYSTOLIC BLOOD PRESSURE: 161 MMHG

## 2023-01-28 VITALS
RESPIRATION RATE: 16 BRPM | HEART RATE: 72 BPM | OXYGEN SATURATION: 100 % | SYSTOLIC BLOOD PRESSURE: 147 MMHG | TEMPERATURE: 98 F | DIASTOLIC BLOOD PRESSURE: 82 MMHG

## 2023-01-28 DIAGNOSIS — S09.91XA UNSPECIFIED INJURY OF EAR, INITIAL ENCOUNTER: Chronic | ICD-10-CM

## 2023-01-28 DIAGNOSIS — Z98.891 HISTORY OF UTERINE SCAR FROM PREVIOUS SURGERY: Chronic | ICD-10-CM

## 2023-01-28 PROCEDURE — 99284 EMERGENCY DEPT VISIT MOD MDM: CPT | Mod: 25

## 2023-01-28 PROCEDURE — 71046 X-RAY EXAM CHEST 2 VIEWS: CPT

## 2023-01-28 PROCEDURE — 99284 EMERGENCY DEPT VISIT MOD MDM: CPT

## 2023-01-28 PROCEDURE — 93005 ELECTROCARDIOGRAM TRACING: CPT

## 2023-01-28 PROCEDURE — 71046 X-RAY EXAM CHEST 2 VIEWS: CPT | Mod: 26

## 2023-01-28 PROCEDURE — 72125 CT NECK SPINE W/O DYE: CPT | Mod: MA

## 2023-01-28 PROCEDURE — 72125 CT NECK SPINE W/O DYE: CPT | Mod: 26,MA

## 2023-01-28 RX ORDER — ACETAMINOPHEN 500 MG
975 TABLET ORAL ONCE
Refills: 0 | Status: COMPLETED | OUTPATIENT
Start: 2023-01-28 | End: 2023-01-28

## 2023-01-28 RX ORDER — IBUPROFEN 200 MG
600 TABLET ORAL ONCE
Refills: 0 | Status: COMPLETED | OUTPATIENT
Start: 2023-01-28 | End: 2023-01-28

## 2023-01-28 RX ORDER — LIDOCAINE 4 G/100G
1 CREAM TOPICAL ONCE
Refills: 0 | Status: COMPLETED | OUTPATIENT
Start: 2023-01-28 | End: 2023-01-28

## 2023-01-28 RX ADMIN — Medication 600 MILLIGRAM(S): at 11:28

## 2023-01-28 RX ADMIN — Medication 975 MILLIGRAM(S): at 11:28

## 2023-01-28 RX ADMIN — LIDOCAINE 1 PATCH: 4 CREAM TOPICAL at 11:28

## 2023-01-28 NOTE — ED PROVIDER NOTE - NSFOLLOWUPINSTRUCTIONS_ED_ALL_ED_FT
- stay hydrated.   -take all home medications as prescribed  - take tylenol 975mg and ibuprofen 600mg every 6 hours as needed for pain-take with meals.  -place lidocaine patch over painful area and replace every 12 hours as needed  - follow up with your primary care provider in 1-2 days.    - return if symptoms worsen, fever, weakness, numbness/tingling, blurred vision, difficulty ambulating and all other concerns. - stay hydrated.   -take all home medications as prescribed  - take tylenol 975mg and ibuprofen 600mg every 6 hours as needed for pain-take with meals.  -place lidocaine patch over painful area and replace every 12 hours as needed  - follow up with your primary care provider in 1-2 days.  -follow up with your neurosurgeon this week    - return if symptoms worsen, fever, weakness, numbness/tingling, blurred vision, difficulty ambulating and all other concerns.

## 2023-01-28 NOTE — ED PROVIDER NOTE - OBJECTIVE STATEMENT
61 yo female with pmh hld, hypothyroidism, cervical spine surgery 6 years ago here with acute on chronic R posterior neck pain radiating to her R shoulder x over a year worsening over the past month. Pt went to  for eval and was referred to the ER for evaluation. Pt states she has been taking tylenol for pain which has not improved symptoms. PT has seen neurosurgeon who referrd pt to ortho and neurology, had CT scans of the neck as well as MRI of the brain which were both nonactionable. Pt was recommended to PT for pain but states she has not been able to perform 2/2 pain. No numbness, tingling, weakness, fevers, chills. No recent injuries or falls.

## 2023-01-28 NOTE — ED PROVIDER NOTE - PHYSICAL EXAMINATION
A&Ox3, NAD, well appearing  NCAT. PERRL, EOMI.  Neck supple, no LAD. +ttp of vertebral c-spine, c-spine with FAROM  Lungs CTAB. No w/r/r  Cardiac +S1S2, RRR, No m/r/g.   Abd soft, NT/ND,  no rebound or guarding.   Extremities: cap refill <2, pulses in distal extremities 4+, sensations intact throughout.   Skin without rash.   No focal Deficits, Strength 5/5 UE/LE. Gait steady.

## 2023-01-28 NOTE — ED PROVIDER NOTE - CLINICAL SUMMARY MEDICAL DECISION MAKING FREE TEXT BOX
Harriet: 62 year old female with cervical spine surgery 6 years ago here with right neck and right shoulder pain x 6 years worse in the past 2 months.  hsitory of cervial spine surgery 6 years ago. seen by nsx afterwards and neuro and had mri done at the time with no acute findings. PE: att exam: patient awake alert NAD. no midline c-spine tenderness. + ttp right paraspinal neck. LUNGS CTAB no wheeze no crackle. CARD RRR no m/r/g.  Abdomen soft NT ND no rebound no guarding no CVA tenderness. EXT: 5/5 b/l ue/le. CV 2+DP/PT bilaterally. neuro A&Ox3.  skin warm and dry no rash  will get imaging, pain control, ekg, cxr. recommend f/u outpatient as ongoing for some time.  no new changes today per patient.

## 2023-01-28 NOTE — ED PROVIDER NOTE - PATIENT PORTAL LINK FT
You can access the FollowMyHealth Patient Portal offered by [facility] by registering at the following website: [email or web address]. By joining [facility]’s FollowMyHealth portal, you will also be able to view your health information using other applications (apps) compatible with our system.

## 2023-01-28 NOTE — ED ADULT NURSE NOTE - OBJECTIVE STATEMENT
Ambulatory, well-appearing patient in no apparent distress complains of multiple chronic complaints. Ambulatory, well-appearing patient in no apparent distress complains of multiple chronic complaints.  Pt had surgery to c spine 6 years ago and has persistent pain in neck and back that radiates to chest since then, has never followed up with surgeon.  Saw PMD for same symptoms 1 month ago who recommended PT which she has not initiated.  Pt went to [facility] this morning because she could not sleep last night who referred her here after giving ASA.  Has not taken anything else for pain.

## 2023-01-28 NOTE — ED PROVIDER NOTE - NS ED ATTENDING STATEMENT MOD
This was a shared visit with the NATALIIA. I reviewed and verified the documentation and independently performed the documented:

## 2023-01-30 ENCOUNTER — NON-APPOINTMENT (OUTPATIENT)
Age: 63
End: 2023-01-30

## 2023-01-31 ENCOUNTER — APPOINTMENT (OUTPATIENT)
Dept: CARDIOLOGY | Facility: CLINIC | Age: 63
End: 2023-01-31

## 2023-02-02 ENCOUNTER — APPOINTMENT (OUTPATIENT)
Dept: OPHTHALMOLOGY | Facility: CLINIC | Age: 63
End: 2023-02-02

## 2023-02-14 ENCOUNTER — APPOINTMENT (OUTPATIENT)
Dept: OBGYN | Facility: CLINIC | Age: 63
End: 2023-02-14

## 2023-03-28 NOTE — DISCHARGE NOTE ADULT - SECONDARY DIAGNOSIS.
Anxiety Depression Injury of ear canal Nsaids Counseling: NSAID Counseling: I discussed with the patient that NSAIDs should be taken with food. Prolonged use of NSAIDs can result in the development of stomach ulcers.  Patient advised to stop taking NSAIDs if abdominal pain occurs.  The patient verbalized understanding of the proper use and possible adverse effects of NSAIDs.  All of the patient's questions and concerns were addressed.

## 2023-04-25 RX ORDER — ATORVASTATIN CALCIUM 40 MG/1
40 TABLET, FILM COATED ORAL
Qty: 90 | Refills: 0 | Status: ACTIVE | COMMUNITY
Start: 2023-04-25

## 2023-04-25 RX ORDER — ATORVASTATIN CALCIUM 10 MG/1
10 TABLET, FILM COATED ORAL
Qty: 90 | Refills: 3 | Status: DISCONTINUED | COMMUNITY
Start: 2022-04-01 | End: 2023-04-25

## 2023-06-08 ENCOUNTER — APPOINTMENT (OUTPATIENT)
Dept: CT IMAGING | Facility: CLINIC | Age: 63
End: 2023-06-08

## 2023-06-23 ENCOUNTER — APPOINTMENT (OUTPATIENT)
Dept: PLASTIC SURGERY | Facility: CLINIC | Age: 63
End: 2023-06-23

## 2023-06-26 ENCOUNTER — APPOINTMENT (OUTPATIENT)
Dept: INTERNAL MEDICINE | Facility: CLINIC | Age: 63
End: 2023-06-26
Payer: MEDICAID

## 2023-06-26 ENCOUNTER — OUTPATIENT (OUTPATIENT)
Dept: OUTPATIENT SERVICES | Facility: HOSPITAL | Age: 63
LOS: 1 days | End: 2023-06-26
Payer: MEDICAID

## 2023-06-26 VITALS
WEIGHT: 127 LBS | BODY MASS INDEX: 21.68 KG/M2 | DIASTOLIC BLOOD PRESSURE: 86 MMHG | OXYGEN SATURATION: 99 % | HEART RATE: 96 BPM | HEIGHT: 64 IN | SYSTOLIC BLOOD PRESSURE: 142 MMHG

## 2023-06-26 DIAGNOSIS — Z98.891 HISTORY OF UTERINE SCAR FROM PREVIOUS SURGERY: Chronic | ICD-10-CM

## 2023-06-26 DIAGNOSIS — M54.50 LOW BACK PAIN, UNSPECIFIED: ICD-10-CM

## 2023-06-26 DIAGNOSIS — I10 ESSENTIAL (PRIMARY) HYPERTENSION: ICD-10-CM

## 2023-06-26 PROCEDURE — 99213 OFFICE O/P EST LOW 20 MIN: CPT | Mod: GE

## 2023-06-26 PROCEDURE — 85025 COMPLETE CBC W/AUTO DIFF WBC: CPT

## 2023-06-26 PROCEDURE — G0463: CPT

## 2023-06-26 PROCEDURE — 80053 COMPREHEN METABOLIC PANEL: CPT

## 2023-06-26 PROCEDURE — 36415 COLL VENOUS BLD VENIPUNCTURE: CPT

## 2023-06-26 PROCEDURE — 84443 ASSAY THYROID STIM HORMONE: CPT

## 2023-06-26 PROCEDURE — 83036 HEMOGLOBIN GLYCOSYLATED A1C: CPT

## 2023-06-26 PROCEDURE — 80061 LIPID PANEL: CPT

## 2023-06-26 RX ORDER — DULOXETINE HYDROCHLORIDE 30 MG/1
30 CAPSULE, DELAYED RELEASE PELLETS ORAL
Qty: 53 | Refills: 1 | Status: DISCONTINUED | COMMUNITY
Start: 2022-12-29 | End: 2023-06-26

## 2023-06-26 RX ORDER — MELOXICAM 15 MG/1
15 TABLET ORAL
Qty: 30 | Refills: 1 | Status: DISCONTINUED | COMMUNITY
Start: 2022-02-08 | End: 2023-06-26

## 2023-06-26 RX ORDER — ESOMEPRAZOLE MAGNESIUM 20 MG/1
20 CAPSULE, DELAYED RELEASE ORAL DAILY
Qty: 30 | Refills: 1 | Status: DISCONTINUED | COMMUNITY
Start: 2021-07-01 | End: 2023-06-26

## 2023-06-26 RX ORDER — MULTIVITAMIN
TABLET ORAL
Refills: 0 | Status: DISCONTINUED | COMMUNITY
End: 2023-06-26

## 2023-06-27 PROBLEM — M54.50 LOW BACK PAIN: Status: ACTIVE | Noted: 2022-07-13

## 2023-06-27 LAB
ALBUMIN SERPL ELPH-MCNC: 5.1 G/DL
ALP BLD-CCNC: 101 U/L
ALT SERPL-CCNC: 15 U/L
ANION GAP SERPL CALC-SCNC: 11 MMOL/L
AST SERPL-CCNC: 16 U/L
BILIRUB SERPL-MCNC: 0.2 MG/DL
BUN SERPL-MCNC: 11 MG/DL
CALCIUM SERPL-MCNC: 10.1 MG/DL
CHLORIDE SERPL-SCNC: 104 MMOL/L
CHOLEST SERPL-MCNC: 155 MG/DL
CO2 SERPL-SCNC: 25 MMOL/L
CREAT SERPL-MCNC: 0.62 MG/DL
EGFR: 99 ML/MIN/1.73M2
ESTIMATED AVERAGE GLUCOSE: 126 MG/DL
GLUCOSE SERPL-MCNC: 96 MG/DL
HBA1C MFR BLD HPLC: 6 %
HDLC SERPL-MCNC: 59 MG/DL
LDLC SERPL CALC-MCNC: 71 MG/DL
NONHDLC SERPL-MCNC: 96 MG/DL
POTASSIUM SERPL-SCNC: 4.1 MMOL/L
PROT SERPL-MCNC: 7.8 G/DL
SODIUM SERPL-SCNC: 140 MMOL/L
TRIGL SERPL-MCNC: 128 MG/DL
TSH SERPL-ACNC: 1.1 UIU/ML

## 2023-06-27 NOTE — HISTORY OF PRESENT ILLNESS
[FreeTextEntry1] : Back pain [de-identified] : Ms. Chinchilla is a 61 YO woman with PMH of hypothyroidism, depression, anxiety, OA s/p anterior discectomy c/b post-op Oc syndrome, chronic back pain, who presents for follow up of back pain. She notes that she has had back pain for a long time, but that currently it is limiting her ability to look over her R shoulder and sleep on her right side. The pain is in her right back - cervical spine, scapula, and radiates forward to her chest. She saw a cardiologist, underwent a cath which showed no blockages/no stent placement needed. She saw her neurosurgeon, Dr. Timmons, who recommended CT but was unable to schedule 2/2 needing financial authorization. She denies fever, morning stiffness, shortness of breath, abdominal pain, changes in bathroom habits. She does feel down.

## 2023-06-27 NOTE — ASSESSMENT
[FreeTextEntry1] : Ms. Chinchilla is a 61 YO woman with PMH of hypothyroidism, depression, anxiety, OA s/p anterior discectomy c/b post-op Oc syndrome, chronic back pain, who presents for follow up of back pain.\par \par Back pain\par - Follows with neurosurgery - Dr. Timmons at [place]\par - Tried to get CT spine, but did not have authorization number, will call Dr. Timmons's office to assist in getting authorization\par - Refilled lidocaine and diclofenac gel prescriptions\par - PT referral given\par \par Depression\par - Patient would prefer to remain off medications for the time being\par - Will follow up with psychology, has appointment for August\par \par HCM\par - Colonoscopy due 2023 - referral given for GI\par - Pap due 2023 - referral given for GYN\par - CBC, CMP/Mg/Phos, TSH, A1c, lipids\par \par RTC in September for CPE

## 2023-06-27 NOTE — PHYSICAL EXAM
[Well Nourished] : well nourished [Well Developed] : well developed [Normal Sclera/Conjunctiva] : normal sclera/conjunctiva [EOMI] : extraocular movements intact [Normal Outer Ear/Nose] : the outer ears and nose were normal in appearance [No Respiratory Distress] : no respiratory distress  [No Accessory Muscle Use] : no accessory muscle use [Clear to Auscultation] : lungs were clear to auscultation bilaterally [Normal Rate] : normal rate  [Regular Rhythm] : with a regular rhythm [Normal S1, S2] : normal S1 and S2 [Pedal Pulses Present] : the pedal pulses are present [No Edema] : there was no peripheral edema [Soft] : abdomen soft [Non Tender] : non-tender [Non-distended] : non-distended [Kyphosis] : kyphosis [No Rash] : no rash [Coordination Grossly Intact] : coordination grossly intact [Normal Gait] : normal gait [Normal Affect] : the affect was normal [Normal Insight/Judgement] : insight and judgment were intact [de-identified] : Tearful [de-identified] : Restricted ROM with turning head to R [de-identified] : No tenderness to palpation, but notes that when her daughter massages her spine she does feel some tenderness [de-identified] : Pain above and below the joint on shoulder flexion/abduction both active and passive

## 2023-06-27 NOTE — REVIEW OF SYSTEMS
[Chest Pain] : chest pain [Back Pain] : back pain [Anxiety] : anxiety [Depression] : depression [Negative] : Heme/Lymph

## 2023-06-28 DIAGNOSIS — M54.9 DORSALGIA, UNSPECIFIED: ICD-10-CM

## 2023-06-28 DIAGNOSIS — F32.A DEPRESSION, UNSPECIFIED: ICD-10-CM

## 2023-06-28 DIAGNOSIS — M54.50 LOW BACK PAIN, UNSPECIFIED: ICD-10-CM

## 2023-09-09 ENCOUNTER — EMERGENCY (EMERGENCY)
Facility: HOSPITAL | Age: 63
LOS: 1 days | Discharge: HOME | End: 2023-09-09
Attending: EMERGENCY MEDICINE
Payer: MEDICAID

## 2023-09-09 VITALS
HEART RATE: 113 BPM | RESPIRATION RATE: 20 BRPM | OXYGEN SATURATION: 100 % | TEMPERATURE: 98 F | DIASTOLIC BLOOD PRESSURE: 91 MMHG | WEIGHT: 130.07 LBS | SYSTOLIC BLOOD PRESSURE: 177 MMHG | HEIGHT: 62 IN

## 2023-09-09 VITALS
OXYGEN SATURATION: 100 % | DIASTOLIC BLOOD PRESSURE: 78 MMHG | SYSTOLIC BLOOD PRESSURE: 130 MMHG | RESPIRATION RATE: 15 BRPM | HEART RATE: 66 BPM | TEMPERATURE: 98 F

## 2023-09-09 DIAGNOSIS — Z98.891 HISTORY OF UTERINE SCAR FROM PREVIOUS SURGERY: Chronic | ICD-10-CM

## 2023-09-09 DIAGNOSIS — S09.91XA UNSPECIFIED INJURY OF EAR, INITIAL ENCOUNTER: Chronic | ICD-10-CM

## 2023-09-09 LAB
ALBUMIN SERPL ELPH-MCNC: 5 G/DL — SIGNIFICANT CHANGE UP (ref 3.3–5)
ALP SERPL-CCNC: 93 U/L — SIGNIFICANT CHANGE UP (ref 40–120)
ALT FLD-CCNC: 13 U/L — SIGNIFICANT CHANGE UP (ref 10–45)
ANION GAP SERPL CALC-SCNC: 16 MMOL/L — SIGNIFICANT CHANGE UP (ref 5–17)
AST SERPL-CCNC: 15 U/L — SIGNIFICANT CHANGE UP (ref 10–40)
BASOPHILS # BLD AUTO: 0.04 K/UL — SIGNIFICANT CHANGE UP (ref 0–0.2)
BASOPHILS NFR BLD AUTO: 0.7 % — SIGNIFICANT CHANGE UP (ref 0–2)
BILIRUB SERPL-MCNC: 0.1 MG/DL — LOW (ref 0.2–1.2)
BUN SERPL-MCNC: 8 MG/DL — SIGNIFICANT CHANGE UP (ref 7–23)
CALCIUM SERPL-MCNC: 10.5 MG/DL — SIGNIFICANT CHANGE UP (ref 8.4–10.5)
CHLORIDE SERPL-SCNC: 105 MMOL/L — SIGNIFICANT CHANGE UP (ref 96–108)
CO2 SERPL-SCNC: 21 MMOL/L — LOW (ref 22–31)
CREAT SERPL-MCNC: 0.57 MG/DL — SIGNIFICANT CHANGE UP (ref 0.5–1.3)
D DIMER BLD IA.RAPID-MCNC: <150 NG/ML DDU — SIGNIFICANT CHANGE UP
EGFR: 101 ML/MIN/1.73M2 — SIGNIFICANT CHANGE UP
EOSINOPHIL # BLD AUTO: 0.05 K/UL — SIGNIFICANT CHANGE UP (ref 0–0.5)
EOSINOPHIL NFR BLD AUTO: 0.8 % — SIGNIFICANT CHANGE UP (ref 0–6)
GLUCOSE SERPL-MCNC: 81 MG/DL — SIGNIFICANT CHANGE UP (ref 70–99)
HCT VFR BLD CALC: 37.3 % — SIGNIFICANT CHANGE UP (ref 34.5–45)
HGB BLD-MCNC: 12.3 G/DL — SIGNIFICANT CHANGE UP (ref 11.5–15.5)
IMM GRANULOCYTES NFR BLD AUTO: 0.3 % — SIGNIFICANT CHANGE UP (ref 0–0.9)
LYMPHOCYTES # BLD AUTO: 1.67 K/UL — SIGNIFICANT CHANGE UP (ref 1–3.3)
LYMPHOCYTES # BLD AUTO: 28.2 % — SIGNIFICANT CHANGE UP (ref 13–44)
MCHC RBC-ENTMCNC: 29.6 PG — SIGNIFICANT CHANGE UP (ref 27–34)
MCHC RBC-ENTMCNC: 33 GM/DL — SIGNIFICANT CHANGE UP (ref 32–36)
MCV RBC AUTO: 89.9 FL — SIGNIFICANT CHANGE UP (ref 80–100)
MONOCYTES # BLD AUTO: 0.4 K/UL — SIGNIFICANT CHANGE UP (ref 0–0.9)
MONOCYTES NFR BLD AUTO: 6.8 % — SIGNIFICANT CHANGE UP (ref 2–14)
NEUTROPHILS # BLD AUTO: 3.74 K/UL — SIGNIFICANT CHANGE UP (ref 1.8–7.4)
NEUTROPHILS NFR BLD AUTO: 63.2 % — SIGNIFICANT CHANGE UP (ref 43–77)
NRBC # BLD: 0 /100 WBCS — SIGNIFICANT CHANGE UP (ref 0–0)
PLATELET # BLD AUTO: 283 K/UL — SIGNIFICANT CHANGE UP (ref 150–400)
POTASSIUM SERPL-MCNC: 3.9 MMOL/L — SIGNIFICANT CHANGE UP (ref 3.5–5.3)
POTASSIUM SERPL-SCNC: 3.9 MMOL/L — SIGNIFICANT CHANGE UP (ref 3.5–5.3)
PROT SERPL-MCNC: 8.2 G/DL — SIGNIFICANT CHANGE UP (ref 6–8.3)
RBC # BLD: 4.15 M/UL — SIGNIFICANT CHANGE UP (ref 3.8–5.2)
RBC # FLD: 12.9 % — SIGNIFICANT CHANGE UP (ref 10.3–14.5)
SODIUM SERPL-SCNC: 142 MMOL/L — SIGNIFICANT CHANGE UP (ref 135–145)
TROPONIN T, HIGH SENSITIVITY RESULT: <6 NG/L — SIGNIFICANT CHANGE UP (ref 0–51)
WBC # BLD: 5.92 K/UL — SIGNIFICANT CHANGE UP (ref 3.8–10.5)
WBC # FLD AUTO: 5.92 K/UL — SIGNIFICANT CHANGE UP (ref 3.8–10.5)

## 2023-09-09 PROCEDURE — 85025 COMPLETE CBC W/AUTO DIFF WBC: CPT

## 2023-09-09 PROCEDURE — 71045 X-RAY EXAM CHEST 1 VIEW: CPT | Mod: 26

## 2023-09-09 PROCEDURE — 96374 THER/PROPH/DIAG INJ IV PUSH: CPT

## 2023-09-09 PROCEDURE — 80053 COMPREHEN METABOLIC PANEL: CPT

## 2023-09-09 PROCEDURE — 96375 TX/PRO/DX INJ NEW DRUG ADDON: CPT

## 2023-09-09 PROCEDURE — 85379 FIBRIN DEGRADATION QUANT: CPT

## 2023-09-09 PROCEDURE — 99285 EMERGENCY DEPT VISIT HI MDM: CPT | Mod: 25

## 2023-09-09 PROCEDURE — 84484 ASSAY OF TROPONIN QUANT: CPT

## 2023-09-09 PROCEDURE — 71045 X-RAY EXAM CHEST 1 VIEW: CPT

## 2023-09-09 PROCEDURE — 36415 COLL VENOUS BLD VENIPUNCTURE: CPT

## 2023-09-09 PROCEDURE — 99285 EMERGENCY DEPT VISIT HI MDM: CPT

## 2023-09-09 RX ORDER — LIDOCAINE 4 G/100G
1 CREAM TOPICAL ONCE
Refills: 0 | Status: COMPLETED | OUTPATIENT
Start: 2023-09-09 | End: 2023-09-09

## 2023-09-09 RX ORDER — ACETAMINOPHEN 500 MG
1000 TABLET ORAL ONCE
Refills: 0 | Status: COMPLETED | OUTPATIENT
Start: 2023-09-09 | End: 2023-09-09

## 2023-09-09 RX ORDER — KETOROLAC TROMETHAMINE 30 MG/ML
15 SYRINGE (ML) INJECTION ONCE
Refills: 0 | Status: DISCONTINUED | OUTPATIENT
Start: 2023-09-09 | End: 2023-09-09

## 2023-09-09 RX ADMIN — Medication 15 MILLIGRAM(S): at 14:53

## 2023-09-09 RX ADMIN — Medication 1000 MILLIGRAM(S): at 14:53

## 2023-09-09 RX ADMIN — Medication 400 MILLIGRAM(S): at 14:45

## 2023-09-09 RX ADMIN — LIDOCAINE 1 PATCH: 4 CREAM TOPICAL at 14:45

## 2023-09-09 RX ADMIN — Medication 15 MILLIGRAM(S): at 14:45

## 2023-09-09 NOTE — ED PROVIDER NOTE - PHYSICAL EXAMINATION
PHYSICAL EXAM:  CONSTITUTIONAL: Well appearing, awake, alert, oriented to person, place, time/situation and in no apparent distress.  HEAD: Atraumatic  NECK: supple, no meningismus.   EYES: Clear bilaterally, pupils equal, round and reactive to light.  ENMT: Airway patent, Nasal mucosa clear. Mouth with normal mucosa. Uvula is midline.   CARDIAC: Normal rate, regular rhythm. +S1/S2. No murmurs, rubs or gallops.  RESPIRATORY: Breathing unlabored. Breath sounds clear and equal bilaterally.  ABDOMEN:  Soft, nontender, nondistended. No rebound tenderness or guarding.  NEUROLOGICAL: Alert and oriented, no focal deficits, no motor or sensory deficits. CN2-12 intact. Sensation intact x4 extremities. Strength 5/5 of upper extremities b/l.   MSK: No clubbing, cyanosis, or edema. Full range of motion of all extremities. No tenderness to palpation. No midline or paraspinal tenderness. No spinal step-offs.  SKIN: Skin warm and dry. No evidence of rashes or lesions.

## 2023-09-09 NOTE — ED PROVIDER NOTE - OBJECTIVE STATEMENT
62-year-old female with history of chronic upper back and neck pain, status post cervical spine surgery 8 years ago, Hypothyroidism presents to the ED with several months of upper back and shoulder pain, intermittently worsening, acutely worse the last day with difficulty sleeping not relieved with Tylenol.  Patient last took Tylenol yesterday. Denies numbness, tingling, weakness, falls or trauma.  Has had multiple work-ups for pain in the past including negative Cardiac catheterization in April 2023, 5 months ago. States she has some pain with breathing. No history of clots, cancer. Denies fever, chills, chest pain, shortness of breath, nausea, vomiting, diarrhea, abdominal pain. Hasn't taken anything for symptoms.     [id number]

## 2023-09-09 NOTE — ED PROVIDER NOTE - CLINICAL SUMMARY MEDICAL DECISION MAKING FREE TEXT BOX
Evonne Silverman DO PGY-3  62-year-old female with history of chronic upper back pain/neck pain hypothyroidism presents to the ED with several months of neck upper back pain and shoulder pain.  Has been seen in the ER previously for similar symptoms with negative CT imaging.  Has had negative cardiac work-up in the past few months with negative cardiac catheterization.  Pain likely secondary to to chronic back pain/arthritis, will additionally evaluate for atypical ACS (EKG sinus tach without ischemic changes), PE though low risk will evaluate with D-dimer, plan to obtain labs, treat pain, monitor, and reassess.

## 2023-09-09 NOTE — ED ADULT NURSE NOTE - NSICDXPASTSURGICALHX_GEN_ALL_CORE_FT
Gabapentin Pregnancy And Lactation Text: This medication is Pregnancy Category C and isn't considered safe during pregnancy. It is excreted in breast milk. PAST SURGICAL HISTORY:  Injury of ear canal left ear,  30 years ago    S/P  x 2

## 2023-09-09 NOTE — ED PROVIDER NOTE - PATIENT PORTAL LINK FT
You can access the FollowMyHealth Patient Portal offered by [facility] by registering at the following website: [email or web address]. By joining [employer]’s FollowMyHealth portal, you will also be able to view your health information using other applications (apps) compatible with our system.

## 2023-09-09 NOTE — ED ADULT NURSE NOTE - NSFALLUNIVINTERV_ED_ALL_ED
Bed/Stretcher in lowest position, wheels locked, appropriate side rails in place/Call bell, personal items and telephone in reach/Instruct patient to call for assistance before getting out of bed/chair/stretcher/Non-slip footwear applied when patient is off stretcher/[place] to call system/Physically safe environment - no spills, clutter or unnecessary equipment/Purposeful proactive rounding/Room/bathroom lighting operational, light cord in reach

## 2023-09-09 NOTE — ED ADULT NURSE NOTE - OBJECTIVE STATEMENT
63 yo female with pmh chronic upper back and neck pain s/p cervical spine surgery, hypothyroidism presents to ED c/o continued upper back and neck pain, worsening over the last day. Pt denies cp, sob, h/a, vision changes, dizziness, weakness, numbness/tingling, Denies numbness, tingling, weakness, falls or trauma.  Has had multiple work-ups for pain in the past including negative Cardiac catheterization in April 2023, 5 months ago. States she has some pain with breathing. No history of clots, cancer. Denies fever, chills, chest pain, shortness of breath, nausea, vomiting, diarrhea, abdominal pain. Hasn't taken anything for symptoms. 61 yo female with pmh chronic upper back and neck pain s/p cervical spine surgery, hypothyroidism presents to ED c/o continued upper back and neck pain, worsening over the last day. Pt denies cp, sob, h/a, vision changes, dizziness, weakness, numbness/tingling, falls/trauma. Pt a&ox3, breathing spontaneous and unlabored, moving all extremities and following commands, skin warm dry and appropriate color. Pt placed on CM. Pt safety measures in place and comfort provided. 61 yo female with pmh chronic upper back and neck pain s/p cervical spine surgery, hypothyroidism presents to ED c/o continued upper back and neck pain, worsening over the last day. Pt endorses pain with inspiration. Pt denies cp, sob, h/a, vision changes, dizziness, weakness, numbness/tingling, falls/trauma. Pt a&ox3, breathing spontaneous and unlabored, moving all extremities and following commands, skin warm dry and appropriate color. Pt placed on CM. Pt safety measures in place and comfort provided.

## 2023-09-09 NOTE — ED PROVIDER NOTE - NSICDXPASTMEDICALHX_GEN_ALL_CORE_FT
PAST MEDICAL HISTORY:  Anxiety     Depression     Hypothyroid     OA (osteoarthritis)     Pseudotumor cerebri

## 2023-09-09 NOTE — ED PROVIDER NOTE - ATTENDING CONTRIBUTION TO CARE
62-year-old female with history of chronic upper back pain/neck pain hypothyroidism presents to the ED  with upper back and neck pain, no cp or sob. pt with h/o neck pain s/p surgery followed and told to see pain mgmt, didn't take anything for pain, nvi to ue. labs, dimer, pain control, with ST on arrival but low suspicion, signed out pending work up.

## 2023-09-09 NOTE — ED PROVIDER NOTE - NSFOLLOWUPINSTRUCTIONS_ED_ALL_ED_FT
You were seen in the ER today for back pain.    Please follow up with your primary care doctor within 1 - 3 days. Call and let them know you were seen in the ER today.   Bring the results of your blood work and imaging with you to your appointment, if applicable.    Please follow-up with a primary care doctor, rheumatologist, and orthopedist for your symptoms.  We put in referrals in the referral center for you, someone will call you in the next 3-5 business days to help you set up an appointment.    For pain, please take acetaminophen 650 mg every 6 hours for pain. Additionally, you can also take ibuprofen 600 mg every 6 hours for pain.    Return to the ER for any worsening symptoms or concerns, including chest pain, shortness of breath, lightheadedness, weakness, or any other concerns.

## 2023-09-13 ENCOUNTER — APPOINTMENT (OUTPATIENT)
Dept: INTERNAL MEDICINE | Facility: CLINIC | Age: 63
End: 2023-09-13
Payer: MEDICAID

## 2023-09-13 ENCOUNTER — OUTPATIENT (OUTPATIENT)
Dept: OUTPATIENT SERVICES | Facility: HOSPITAL | Age: 63
LOS: 1 days | End: 2023-09-13
Payer: MEDICAID

## 2023-09-13 VITALS — DIASTOLIC BLOOD PRESSURE: 85 MMHG | SYSTOLIC BLOOD PRESSURE: 125 MMHG

## 2023-09-13 VITALS
DIASTOLIC BLOOD PRESSURE: 92 MMHG | HEART RATE: 81 BPM | WEIGHT: 125 LBS | OXYGEN SATURATION: 99 % | HEIGHT: 64 IN | BODY MASS INDEX: 21.34 KG/M2 | SYSTOLIC BLOOD PRESSURE: 128 MMHG

## 2023-09-13 DIAGNOSIS — Z98.891 HISTORY OF UTERINE SCAR FROM PREVIOUS SURGERY: Chronic | ICD-10-CM

## 2023-09-13 DIAGNOSIS — I10 ESSENTIAL (PRIMARY) HYPERTENSION: ICD-10-CM

## 2023-09-13 DIAGNOSIS — S09.91XA UNSPECIFIED INJURY OF EAR, INITIAL ENCOUNTER: Chronic | ICD-10-CM

## 2023-09-13 PROCEDURE — T1013: CPT

## 2023-09-13 PROCEDURE — G0463: CPT

## 2023-09-13 PROCEDURE — 99215 OFFICE O/P EST HI 40 MIN: CPT | Mod: 25

## 2023-09-13 RX ORDER — ASPIRIN 81 MG/1
81 TABLET, CHEWABLE ORAL
Qty: 90 | Refills: 3 | Status: ACTIVE | COMMUNITY
Start: 2023-09-13

## 2023-09-14 DIAGNOSIS — G89.29 OTHER CHRONIC PAIN: ICD-10-CM

## 2023-09-14 DIAGNOSIS — M54.9 DORSALGIA, UNSPECIFIED: ICD-10-CM

## 2023-09-14 DIAGNOSIS — E03.9 HYPOTHYROIDISM, UNSPECIFIED: ICD-10-CM

## 2023-09-14 DIAGNOSIS — F41.9 ANXIETY DISORDER, UNSPECIFIED: ICD-10-CM

## 2023-10-04 ENCOUNTER — APPOINTMENT (OUTPATIENT)
Dept: INTERNAL MEDICINE | Facility: CLINIC | Age: 63
End: 2023-10-04

## 2023-10-27 ENCOUNTER — OUTPATIENT (OUTPATIENT)
Dept: OUTPATIENT SERVICES | Facility: HOSPITAL | Age: 63
LOS: 1 days | End: 2023-10-27
Payer: MEDICAID

## 2023-10-27 ENCOUNTER — APPOINTMENT (OUTPATIENT)
Dept: INTERNAL MEDICINE | Facility: CLINIC | Age: 63
End: 2023-10-27
Payer: MEDICAID

## 2023-10-27 ENCOUNTER — RESULT REVIEW (OUTPATIENT)
Age: 63
End: 2023-10-27

## 2023-10-27 ENCOUNTER — NON-APPOINTMENT (OUTPATIENT)
Age: 63
End: 2023-10-27

## 2023-10-27 VITALS
HEART RATE: 93 BPM | HEIGHT: 64 IN | WEIGHT: 121 LBS | BODY MASS INDEX: 20.66 KG/M2 | DIASTOLIC BLOOD PRESSURE: 90 MMHG | SYSTOLIC BLOOD PRESSURE: 160 MMHG | OXYGEN SATURATION: 98 %

## 2023-10-27 VITALS — SYSTOLIC BLOOD PRESSURE: 150 MMHG | DIASTOLIC BLOOD PRESSURE: 90 MMHG

## 2023-10-27 DIAGNOSIS — R73.03 PREDIABETES.: ICD-10-CM

## 2023-10-27 DIAGNOSIS — E78.5 HYPERLIPIDEMIA, UNSPECIFIED: ICD-10-CM

## 2023-10-27 DIAGNOSIS — G89.29 DORSALGIA, UNSPECIFIED: ICD-10-CM

## 2023-10-27 DIAGNOSIS — F32.A DEPRESSION, UNSPECIFIED: ICD-10-CM

## 2023-10-27 DIAGNOSIS — R73.03 PREDIABETES: ICD-10-CM

## 2023-10-27 DIAGNOSIS — Z98.891 HISTORY OF UTERINE SCAR FROM PREVIOUS SURGERY: Chronic | ICD-10-CM

## 2023-10-27 DIAGNOSIS — R51.9 HEADACHE, UNSPECIFIED: ICD-10-CM

## 2023-10-27 DIAGNOSIS — Z23 ENCOUNTER FOR IMMUNIZATION: ICD-10-CM

## 2023-10-27 DIAGNOSIS — I10 ESSENTIAL (PRIMARY) HYPERTENSION: ICD-10-CM

## 2023-10-27 DIAGNOSIS — S09.91XA UNSPECIFIED INJURY OF EAR, INITIAL ENCOUNTER: Chronic | ICD-10-CM

## 2023-10-27 DIAGNOSIS — Z00.00 ENCOUNTER FOR GENERAL ADULT MEDICAL EXAMINATION WITHOUT ABNORMAL FINDINGS: ICD-10-CM

## 2023-10-27 DIAGNOSIS — F41.9 ANXIETY DISORDER, UNSPECIFIED: ICD-10-CM

## 2023-10-27 DIAGNOSIS — M54.9 DORSALGIA, UNSPECIFIED: ICD-10-CM

## 2023-10-27 DIAGNOSIS — E03.9 HYPOTHYROIDISM, UNSPECIFIED: ICD-10-CM

## 2023-10-27 PROCEDURE — 99214 OFFICE O/P EST MOD 30 MIN: CPT | Mod: 25,GC

## 2023-10-27 PROCEDURE — 82043 UR ALBUMIN QUANTITATIVE: CPT

## 2023-10-27 PROCEDURE — G0463: CPT | Mod: 25

## 2023-10-27 PROCEDURE — G0008: CPT

## 2023-10-27 PROCEDURE — 83036 HEMOGLOBIN GLYCOSYLATED A1C: CPT

## 2023-10-27 RX ORDER — MELOXICAM 7.5 MG/1
7.5 TABLET ORAL
Qty: 14 | Refills: 0 | Status: DISCONTINUED | COMMUNITY
Start: 2023-09-13 | End: 2023-10-27

## 2023-11-03 LAB
CREAT SPEC-SCNC: 50 MG/DL
ESTIMATED AVERAGE GLUCOSE: 123 MG/DL
HBA1C MFR BLD HPLC: 5.9 %
MICROALBUMIN 24H UR DL<=1MG/L-MCNC: <1.2 MG/DL
MICROALBUMIN/CREAT 24H UR-RTO: NORMAL MG/G

## 2023-11-07 ENCOUNTER — APPOINTMENT (OUTPATIENT)
Dept: PLASTIC SURGERY | Facility: CLINIC | Age: 63
End: 2023-11-07

## 2023-11-14 ENCOUNTER — APPOINTMENT (OUTPATIENT)
Dept: INTERNAL MEDICINE | Facility: CLINIC | Age: 63
End: 2023-11-14

## 2023-11-18 ENCOUNTER — TRANSCRIPTION ENCOUNTER (OUTPATIENT)
Age: 63
End: 2023-11-18

## 2023-11-18 ENCOUNTER — APPOINTMENT (OUTPATIENT)
Dept: MAMMOGRAPHY | Facility: CLINIC | Age: 63
End: 2023-11-18
Payer: MEDICAID

## 2023-11-18 ENCOUNTER — OUTPATIENT (OUTPATIENT)
Dept: OUTPATIENT SERVICES | Facility: HOSPITAL | Age: 63
LOS: 1 days | End: 2023-11-18
Payer: MEDICARE

## 2023-11-18 ENCOUNTER — RESULT REVIEW (OUTPATIENT)
Age: 63
End: 2023-11-18

## 2023-11-18 DIAGNOSIS — Z00.00 ENCOUNTER FOR GENERAL ADULT MEDICAL EXAMINATION WITHOUT ABNORMAL FINDINGS: ICD-10-CM

## 2023-11-18 DIAGNOSIS — S09.91XA UNSPECIFIED INJURY OF EAR, INITIAL ENCOUNTER: Chronic | ICD-10-CM

## 2023-11-18 DIAGNOSIS — Z98.891 HISTORY OF UTERINE SCAR FROM PREVIOUS SURGERY: Chronic | ICD-10-CM

## 2023-11-18 PROCEDURE — 77063 BREAST TOMOSYNTHESIS BI: CPT | Mod: 26

## 2023-11-18 PROCEDURE — 77067 SCR MAMMO BI INCL CAD: CPT

## 2023-11-18 PROCEDURE — 77063 BREAST TOMOSYNTHESIS BI: CPT

## 2023-11-18 PROCEDURE — 77067 SCR MAMMO BI INCL CAD: CPT | Mod: 26

## 2023-11-21 ENCOUNTER — APPOINTMENT (OUTPATIENT)
Dept: OBGYN | Facility: CLINIC | Age: 63
End: 2023-11-21
Payer: MEDICAID

## 2023-11-21 VITALS
WEIGHT: 122 LBS | BODY MASS INDEX: 20.83 KG/M2 | SYSTOLIC BLOOD PRESSURE: 140 MMHG | HEIGHT: 64 IN | DIASTOLIC BLOOD PRESSURE: 78 MMHG

## 2023-11-21 DIAGNOSIS — Z01.419 ENCOUNTER FOR GYNECOLOGICAL EXAMINATION (GENERAL) (ROUTINE) W/OUT ABNORMAL FINDINGS: ICD-10-CM

## 2023-11-21 PROCEDURE — G0101: CPT

## 2023-11-21 PROCEDURE — 99397 PER PM REEVAL EST PAT 65+ YR: CPT

## 2023-11-26 LAB
CYTOLOGY CVX/VAG DOC THIN PREP: ABNORMAL
HPV HIGH+LOW RISK DNA PNL CVX: NOT DETECTED

## 2023-12-01 ENCOUNTER — APPOINTMENT (OUTPATIENT)
Dept: INTERNAL MEDICINE | Facility: CLINIC | Age: 63
End: 2023-12-01
Payer: MEDICAID

## 2023-12-01 ENCOUNTER — NON-APPOINTMENT (OUTPATIENT)
Age: 63
End: 2023-12-01

## 2023-12-01 ENCOUNTER — OUTPATIENT (OUTPATIENT)
Dept: OUTPATIENT SERVICES | Facility: HOSPITAL | Age: 63
LOS: 1 days | End: 2023-12-01
Payer: MEDICAID

## 2023-12-01 VITALS
HEART RATE: 70 BPM | DIASTOLIC BLOOD PRESSURE: 70 MMHG | HEIGHT: 64 IN | BODY MASS INDEX: 20.83 KG/M2 | WEIGHT: 122 LBS | SYSTOLIC BLOOD PRESSURE: 112 MMHG | OXYGEN SATURATION: 99 %

## 2023-12-01 DIAGNOSIS — G89.29 PAIN IN RIGHT SHOULDER: ICD-10-CM

## 2023-12-01 DIAGNOSIS — I25.10 ATHEROSCLEROTIC HEART DISEASE OF NATIVE CORONARY ARTERY W/OUT ANGINA PECTORIS: ICD-10-CM

## 2023-12-01 DIAGNOSIS — Z00.00 ENCOUNTER FOR GENERAL ADULT MEDICAL EXAMINATION W/OUT ABNORMAL FINDINGS: ICD-10-CM

## 2023-12-01 DIAGNOSIS — H00.016 HORDEOLUM EXTERNUM LEFT EYE, UNSPECIFIED EYELID: ICD-10-CM

## 2023-12-01 DIAGNOSIS — Z98.891 HISTORY OF UTERINE SCAR FROM PREVIOUS SURGERY: Chronic | ICD-10-CM

## 2023-12-01 DIAGNOSIS — I10 ESSENTIAL (PRIMARY) HYPERTENSION: ICD-10-CM

## 2023-12-01 DIAGNOSIS — F41.9 ANXIETY DISORDER, UNSPECIFIED: ICD-10-CM

## 2023-12-01 DIAGNOSIS — M25.511 PAIN IN RIGHT SHOULDER: ICD-10-CM

## 2023-12-01 DIAGNOSIS — S09.91XA UNSPECIFIED INJURY OF EAR, INITIAL ENCOUNTER: Chronic | ICD-10-CM

## 2023-12-01 DIAGNOSIS — Z23 ENCOUNTER FOR IMMUNIZATION: ICD-10-CM

## 2023-12-01 PROCEDURE — 99213 OFFICE O/P EST LOW 20 MIN: CPT | Mod: 25,GE

## 2023-12-01 PROCEDURE — G0009: CPT

## 2023-12-01 PROCEDURE — G0463: CPT | Mod: 25

## 2023-12-01 RX ORDER — BUSPIRONE HYDROCHLORIDE 10 MG/1
10 TABLET ORAL
Refills: 0 | Status: ACTIVE | COMMUNITY

## 2023-12-01 RX ORDER — DICLOFENAC SODIUM 1% 10 MG/G
1 GEL TOPICAL DAILY
Qty: 1 | Refills: 1 | Status: DISCONTINUED | COMMUNITY
Start: 2022-12-29 | End: 2023-12-01

## 2023-12-01 RX ORDER — FLUOXETINE HYDROCHLORIDE 20 MG/1
20 CAPSULE ORAL
Refills: 0 | Status: ACTIVE | COMMUNITY

## 2023-12-01 RX ORDER — LISINOPRIL 2.5 MG/1
2.5 TABLET ORAL DAILY
Qty: 30 | Refills: 0 | Status: DISCONTINUED | COMMUNITY
Start: 2023-10-27 | End: 2023-12-01

## 2023-12-01 RX ORDER — LIDOCAINE 40 MG/G
4 PATCH TOPICAL
Qty: 30 | Refills: 0 | Status: ACTIVE | COMMUNITY
Start: 2023-12-01 | End: 1900-01-01

## 2023-12-01 RX ORDER — LIDOCAINE 40 MG/G
4 PATCH TOPICAL
Qty: 30 | Refills: 0 | Status: DISCONTINUED | COMMUNITY
Start: 2022-12-29 | End: 2023-12-01

## 2023-12-01 RX ORDER — METOPROLOL SUCCINATE 25 MG/1
25 TABLET, EXTENDED RELEASE ORAL
Refills: 0 | Status: ACTIVE | COMMUNITY

## 2023-12-02 PROBLEM — H00.016 HORDEOLUM OF LEFT EYE: Status: ACTIVE | Noted: 2023-12-01

## 2023-12-02 PROBLEM — I25.10 CAD (CORONARY ARTERY DISEASE): Status: ACTIVE | Noted: 2023-04-25

## 2023-12-13 DIAGNOSIS — Z23 ENCOUNTER FOR IMMUNIZATION: ICD-10-CM

## 2023-12-13 DIAGNOSIS — I25.10 ATHEROSCLEROTIC HEART DISEASE OF NATIVE CORONARY ARTERY WITHOUT ANGINA PECTORIS: ICD-10-CM

## 2023-12-13 DIAGNOSIS — G89.29 OTHER CHRONIC PAIN: ICD-10-CM

## 2023-12-13 DIAGNOSIS — H00.016 HORDEOLUM EXTERNUM LEFT EYE, UNSPECIFIED EYELID: ICD-10-CM

## 2023-12-13 DIAGNOSIS — M54.9 DORSALGIA, UNSPECIFIED: ICD-10-CM

## 2024-01-05 ENCOUNTER — RX RENEWAL (OUTPATIENT)
Age: 64
End: 2024-01-05

## 2024-01-26 ENCOUNTER — APPOINTMENT (OUTPATIENT)
Dept: RHEUMATOLOGY | Facility: HOSPITAL | Age: 64
End: 2024-01-26
Payer: MEDICAID

## 2024-01-26 ENCOUNTER — OUTPATIENT (OUTPATIENT)
Dept: OUTPATIENT SERVICES | Facility: HOSPITAL | Age: 64
LOS: 1 days | End: 2024-01-26
Payer: COMMERCIAL

## 2024-01-26 VITALS
BODY MASS INDEX: 20.66 KG/M2 | HEIGHT: 64 IN | HEART RATE: 68 BPM | WEIGHT: 121 LBS | DIASTOLIC BLOOD PRESSURE: 82 MMHG | TEMPERATURE: 97.2 F | SYSTOLIC BLOOD PRESSURE: 150 MMHG | RESPIRATION RATE: 16 BRPM

## 2024-01-26 DIAGNOSIS — S09.91XA UNSPECIFIED INJURY OF EAR, INITIAL ENCOUNTER: Chronic | ICD-10-CM

## 2024-01-26 DIAGNOSIS — Z98.891 HISTORY OF UTERINE SCAR FROM PREVIOUS SURGERY: Chronic | ICD-10-CM

## 2024-01-26 DIAGNOSIS — M54.2 CERVICALGIA: ICD-10-CM

## 2024-01-26 DIAGNOSIS — G89.29 CERVICALGIA: ICD-10-CM

## 2024-01-26 DIAGNOSIS — M06.9 RHEUMATOID ARTHRITIS, UNSPECIFIED: ICD-10-CM

## 2024-01-26 DIAGNOSIS — Z98.1 ARTHRODESIS STATUS: ICD-10-CM

## 2024-01-26 DIAGNOSIS — M54.12 RADICULOPATHY, CERVICAL REGION: ICD-10-CM

## 2024-01-26 PROCEDURE — 99214 OFFICE O/P EST MOD 30 MIN: CPT | Mod: GC

## 2024-01-26 PROCEDURE — G0463: CPT

## 2024-01-26 RX ORDER — CYCLOBENZAPRINE HYDROCHLORIDE 5 MG/1
5 TABLET, FILM COATED ORAL
Qty: 45 | Refills: 0 | Status: ACTIVE | COMMUNITY
Start: 2024-01-26 | End: 1900-01-01

## 2024-01-30 DIAGNOSIS — M54.12 RADICULOPATHY, CERVICAL REGION: ICD-10-CM

## 2024-01-30 DIAGNOSIS — M54.2 CERVICALGIA: ICD-10-CM

## 2024-01-30 DIAGNOSIS — Z98.1 ARTHRODESIS STATUS: ICD-10-CM

## 2024-01-30 NOTE — HISTORY OF PRESENT ILLNESS
[Depression] : depression [Cough] : cough [Arthralgias] : arthralgias [Falls] : falls [Weight Loss] : weight loss [Fever] : no fever [Chills] : no chills [Malar Facial Rash] : no malar facial rash [Skin Lesions] : no lesions [Skin Nodules] : no skin nodules [Oral Ulcers] : no oral ulcers [Dry Mouth] : no dry mouth [Dysphonia] : no dysphonia [Dysphagia] : no dysphagia [Shortness of Breath] : no shortness of breath [Chest Pain] : no chest pain [Joint Warmth] : no joint warmth [Joint Deformity] : no joint deformity [Decreased ROM] : no decreased range of motion [Morning Stiffness] : no morning stiffness [Eye Pain] : no eye pain [Eye Redness] : no eye redness [Dry Eyes] : no dry eyes [FreeTextEntry1] : ha childrens. no birthing complications. fingers turn white in the cold. lost  30 pounds since 2021.  attributed to lack of appetite and also recurrent illnesses. no known family history of autoimmune disease [de-identified] : lost  30 pounds since 2021. no known family history of autoimmune disease

## 2024-01-30 NOTE — ASSESSMENT
[FreeTextEntry1] : 62yo F PMH hypothyroidism, osteoarthritis s/p C4-C7 anterior cervical discectomy and fusion c/b post op monica syndrome with cervical radiculopathy, anxiety, depression, prediabetes, HLD here for a referral for Fibromyalgia and polyarthritis with significant spinal pain likely sequalea of her well estabslished discogenic disease  ##Neck/spine pain ==Patient with well established cervical disease status post discectomy infusion = patient likely has significant musculoskeletal pain arising from her known cervical disease = patient has not yet been trialed on antispasmodics or neuropathic medications = to give one time trial of antispasmodics = while the patient does have depression, no other clinical signs of fibromyalgia at present = pain in knee and ankle likely related to underlying osteoarthritis as well = prior serologic workup negative, will not repeat at this time = referral to spine center also placed = the patient reports evaluation with doctor dickinson last year, who performed her original surgery, and patient refused to have another procedure done = therefore recommend conservative management at this time  case d/w Dr. Keeley Waddell MD, PGY-5 Rheumatology Fellow [place]

## 2024-01-30 NOTE — PHYSICAL EXAM
[General Appearance - Alert] : alert [General Appearance - In No Acute Distress] : in no acute distress [Sclera] : the sclera and conjunctiva were normal [Outer Ear] : the ears and nose were normal in appearance [Oropharynx] : the oropharynx was normal [Neck Appearance] : the appearance of the neck was normal [Exaggerated Use Of Accessory Muscles For Inspiration] : no accessory muscle use [Respiration, Rhythm And Depth] : normal respiratory rhythm and effort [Auscultation Breath Sounds / Voice Sounds] : lungs were clear to auscultation bilaterally [Heart Rate And Rhythm] : heart rate was normal and rhythm regular [Heart Sounds] : normal S1 and S2 [Edema] : there was no peripheral edema [Musculoskeletal - Swelling] : no joint swelling seen [] : no rash [Oriented To Time, Place, And Person] : oriented to person, place, and time [Affect] : the affect was normal [Impaired Insight] : insight and judgment were intact [FreeTextEntry1] : no synovitis in UE/LE

## 2024-02-05 NOTE — PHYSICAL EXAM
I am ok with it.   [No Acute Distress] : no acute distress [Well-Appearing] : well-appearing [Well Nourished] : well nourished [Well Developed] : well developed [Normal Sclera/Conjunctiva] : normal sclera/conjunctiva [PERRL] : pupils equal round and reactive to light [EOMI] : extraocular movements intact [No JVD] : no jugular venous distention [Normal Oropharynx] : the oropharynx was normal [Normal Outer Ear/Nose] : the outer ears and nose were normal in appearance [Supple] : supple [No Lymphadenopathy] : no lymphadenopathy [Thyroid Normal, No Nodules] : the thyroid was normal and there were no nodules present [No Respiratory Distress] : no respiratory distress  [Clear to Auscultation] : lungs were clear to auscultation bilaterally [No Accessory Muscle Use] : no accessory muscle use [Normal Rate] : normal rate  [Regular Rhythm] : with a regular rhythm [Normal S1, S2] : normal S1 and S2 [No Abdominal Bruit] : a ~M bruit was not heard ~T in the abdomen [No Carotid Bruits] : no carotid bruits [No Murmur] : no murmur heard [Pedal Pulses Present] : the pedal pulses are present [No Varicosities] : no varicosities [No Extremity Clubbing/Cyanosis] : no extremity clubbing/cyanosis [No Edema] : there was no peripheral edema [No Palpable Aorta] : no palpable aorta [Non Tender] : non-tender [Soft] : abdomen soft [Non-distended] : non-distended [No Masses] : no abdominal mass palpated [No HSM] : no HSM [Normal Bowel Sounds] : normal bowel sounds [Normal Anterior Cervical Nodes] : no anterior cervical lymphadenopathy [Normal Posterior Cervical Nodes] : no posterior cervical lymphadenopathy [No CVA Tenderness] : no CVA  tenderness [Grossly Normal Strength/Tone] : grossly normal strength/tone [No Spinal Tenderness] : no spinal tenderness [No Joint Swelling] : no joint swelling [No Rash] : no rash [Normal Gait] : normal gait [Coordination Grossly Intact] : coordination grossly intact [No Focal Deficits] : no focal deficits [Deep Tendon Reflexes (DTR)] : deep tendon reflexes were 2+ and symmetric [Normal Affect] : the affect was normal [Normal Insight/Judgement] : insight and judgment were intact [de-identified] : Negative Romberg.

## 2024-02-21 ENCOUNTER — RESULT REVIEW (OUTPATIENT)
Age: 64
End: 2024-02-21

## 2024-02-21 ENCOUNTER — OUTPATIENT (OUTPATIENT)
Dept: OUTPATIENT SERVICES | Facility: HOSPITAL | Age: 64
LOS: 1 days | End: 2024-02-21
Payer: COMMERCIAL

## 2024-02-21 ENCOUNTER — APPOINTMENT (OUTPATIENT)
Dept: ORTHOPEDIC SURGERY | Facility: HOSPITAL | Age: 64
End: 2024-02-21

## 2024-02-21 VITALS — SYSTOLIC BLOOD PRESSURE: 146 MMHG | DIASTOLIC BLOOD PRESSURE: 84 MMHG | TEMPERATURE: 96.4 F | HEART RATE: 69 BPM

## 2024-02-21 DIAGNOSIS — Z98.891 HISTORY OF UTERINE SCAR FROM PREVIOUS SURGERY: Chronic | ICD-10-CM

## 2024-02-21 DIAGNOSIS — S09.91XA UNSPECIFIED INJURY OF EAR, INITIAL ENCOUNTER: Chronic | ICD-10-CM

## 2024-02-21 DIAGNOSIS — M54.9 DORSALGIA, UNSPECIFIED: ICD-10-CM

## 2024-02-21 DIAGNOSIS — M79.9 SOFT TISSUE DISORDER, UNSPECIFIED: ICD-10-CM

## 2024-02-21 DIAGNOSIS — G89.29 DORSALGIA, UNSPECIFIED: ICD-10-CM

## 2024-02-21 PROCEDURE — G0463: CPT

## 2024-02-21 PROCEDURE — 72040 X-RAY EXAM NECK SPINE 2-3 VW: CPT

## 2024-02-21 PROCEDURE — 72040 X-RAY EXAM NECK SPINE 2-3 VW: CPT | Mod: 26

## 2024-02-22 DIAGNOSIS — M54.9 DORSALGIA, UNSPECIFIED: ICD-10-CM

## 2024-07-12 NOTE — ED ADULT NURSE NOTE - ISOLATION TYPE:
Hospitalist Progress Note               Daily Progress Note: 7/12/2024      Hospital Day: 3     Chief complaint:   Chief Complaint   Patient presents with    Chills        Subjective:   Hospital course to date:   Francisca Portillo is a 54 y.o. female with multiple medical problems including asthma, mental illness, hypertension, obstructive sleep apnea, history of subarachnoid hemorrhage, cerebral aneurysm, who presents today to the emergency department accompanied by her nephew.  Patient states that she was in her usual state of health until last night at approximately 1130 when she had the sudden onset of chills and bodyaches along with cough and shortness of breath.  The symptoms continued to worsen, causing her to present to the emergency department today.  Upon arrival the patient was noted to be febrile with a temperature of 100.6 was tachycardic and was hypoxic with a pulse oximetry of 90% on room air.  Routine lab work was performed blood chemistry was generally unremarkable with exception of slightly elevated glucose of 142.  Lactic acid was elevated at 2.25.  CBC showed a leukocytosis with a white blood cell count of 14 however the remainder of it was within normal limits.  Chest x-ray did not show any specific acute findings however CT scan of the abdomen and pelvis indicated infiltrates in the bilateral lower lobes suggestive of acute pneumonia.  Patient was also noted to be somewhat hypotensive with a soft blood pressure.  Patient denies any chest pain, nausea, vomiting or diarrhea.  No recent injury      --------  7/11-Patient is seen today for follow-up.  No reported issues overnight.  Patient is currently resting in bed comfortably she still does have some supplemental oxygen on via nasal cannula.  She has no specific complaints, there are multiple family members at bedside.    --------  7/12-Patient is seen today for follow-up.  No reported issues from overnight she is currently sitting in bed  None

## 2024-10-01 ENCOUNTER — APPOINTMENT (OUTPATIENT)
Dept: INTERNAL MEDICINE | Facility: CLINIC | Age: 64
End: 2024-10-01

## 2024-10-23 NOTE — ED ADULT NURSE NOTE - CAS EDN DISCHARGE INTERVENTIONS
What Type Of Note Output Would You Prefer (Optional)?: Bullet Format
Hpi Title: Evaluation of Skin Lesions
How Severe Are Your Spot(S)?: mild
Have Your Spot(S) Been Treated In The Past?: has not been treated
IV discontinued, cath removed intact

## 2024-11-15 ENCOUNTER — APPOINTMENT (OUTPATIENT)
Dept: INTERNAL MEDICINE | Facility: CLINIC | Age: 64
End: 2024-11-15
Payer: MEDICARE

## 2024-11-15 ENCOUNTER — MED ADMIN CHARGE (OUTPATIENT)
Age: 64
End: 2024-11-15

## 2024-11-15 ENCOUNTER — OUTPATIENT (OUTPATIENT)
Dept: OUTPATIENT SERVICES | Facility: HOSPITAL | Age: 64
LOS: 1 days | End: 2024-11-15
Payer: MEDICARE

## 2024-11-15 VITALS
OXYGEN SATURATION: 97 % | BODY MASS INDEX: 24.92 KG/M2 | WEIGHT: 146 LBS | HEART RATE: 86 BPM | SYSTOLIC BLOOD PRESSURE: 112 MMHG | DIASTOLIC BLOOD PRESSURE: 76 MMHG | HEIGHT: 64 IN

## 2024-11-15 DIAGNOSIS — M54.9 DORSALGIA, UNSPECIFIED: ICD-10-CM

## 2024-11-15 DIAGNOSIS — M54.2 CERVICALGIA: ICD-10-CM

## 2024-11-15 DIAGNOSIS — Z23 ENCOUNTER FOR IMMUNIZATION: ICD-10-CM

## 2024-11-15 DIAGNOSIS — M25.561 PAIN IN RIGHT KNEE: ICD-10-CM

## 2024-11-15 DIAGNOSIS — G89.29 DORSALGIA, UNSPECIFIED: ICD-10-CM

## 2024-11-15 DIAGNOSIS — Z98.891 HISTORY OF UTERINE SCAR FROM PREVIOUS SURGERY: Chronic | ICD-10-CM

## 2024-11-15 DIAGNOSIS — G89.29 CERVICALGIA: ICD-10-CM

## 2024-11-15 DIAGNOSIS — I10 ESSENTIAL (PRIMARY) HYPERTENSION: ICD-10-CM

## 2024-11-15 DIAGNOSIS — M25.562 PAIN IN RIGHT KNEE: ICD-10-CM

## 2024-11-15 DIAGNOSIS — S09.91XA UNSPECIFIED INJURY OF EAR, INITIAL ENCOUNTER: Chronic | ICD-10-CM

## 2024-11-15 DIAGNOSIS — M25.562 PAIN IN LEFT KNEE: ICD-10-CM

## 2024-11-15 DIAGNOSIS — G89.29 OTHER CHRONIC PAIN: ICD-10-CM

## 2024-11-15 PROCEDURE — 83036 HEMOGLOBIN GLYCOSYLATED A1C: CPT

## 2024-11-15 PROCEDURE — 99203 OFFICE O/P NEW LOW 30 MIN: CPT | Mod: GE,25

## 2024-11-15 PROCEDURE — 90662 IIV NO PRSV INCREASED AG IM: CPT

## 2024-11-15 PROCEDURE — G0008: CPT

## 2024-11-15 PROCEDURE — 84443 ASSAY THYROID STIM HORMONE: CPT

## 2024-11-15 PROCEDURE — G0463: CPT

## 2024-11-15 RX ORDER — DICLOFENAC SODIUM 3 G/100G
3 GEL TOPICAL TWICE DAILY
Qty: 1 | Refills: 0 | Status: ACTIVE | COMMUNITY
Start: 2024-11-15 | End: 1900-01-01

## 2024-11-18 LAB
ESTIMATED AVERAGE GLUCOSE: 128 MG/DL
HBA1C MFR BLD HPLC: 6.1 %
TSH SERPL-ACNC: 2.93 UIU/ML

## 2025-01-29 ENCOUNTER — APPOINTMENT (OUTPATIENT)
Dept: ORTHOPEDIC SURGERY | Facility: CLINIC | Age: 65
End: 2025-01-29

## 2025-01-29 VITALS
DIASTOLIC BLOOD PRESSURE: 87 MMHG | HEIGHT: 64 IN | OXYGEN SATURATION: 98 % | RESPIRATION RATE: 16 BRPM | BODY MASS INDEX: 24.92 KG/M2 | WEIGHT: 146 LBS | SYSTOLIC BLOOD PRESSURE: 151 MMHG | HEART RATE: 95 BPM

## 2025-01-29 DIAGNOSIS — M54.2 CERVICALGIA: ICD-10-CM

## 2025-01-29 PROCEDURE — 72040 X-RAY EXAM NECK SPINE 2-3 VW: CPT

## 2025-01-29 PROCEDURE — 99204 OFFICE O/P NEW MOD 45 MIN: CPT | Mod: 25

## 2025-01-29 RX ORDER — TIZANIDINE 2 MG/1
2 TABLET ORAL
Qty: 28 | Refills: 0 | Status: ACTIVE | COMMUNITY
Start: 2025-01-29 | End: 1900-01-01

## 2025-01-29 RX ORDER — PREDNISONE 50 MG/1
50 TABLET ORAL DAILY
Qty: 5 | Refills: 0 | Status: ACTIVE | COMMUNITY
Start: 2025-01-29 | End: 1900-01-01

## 2025-02-01 ENCOUNTER — EMERGENCY (EMERGENCY)
Facility: HOSPITAL | Age: 65
LOS: 1 days | End: 2025-02-01
Attending: EMERGENCY MEDICINE
Payer: MEDICARE

## 2025-02-01 VITALS
HEART RATE: 80 BPM | RESPIRATION RATE: 18 BRPM | DIASTOLIC BLOOD PRESSURE: 81 MMHG | TEMPERATURE: 98 F | OXYGEN SATURATION: 100 % | SYSTOLIC BLOOD PRESSURE: 136 MMHG

## 2025-02-01 VITALS
HEIGHT: 62 IN | SYSTOLIC BLOOD PRESSURE: 152 MMHG | WEIGHT: 149.03 LBS | DIASTOLIC BLOOD PRESSURE: 79 MMHG | RESPIRATION RATE: 18 BRPM | TEMPERATURE: 98 F | HEART RATE: 83 BPM | OXYGEN SATURATION: 99 %

## 2025-02-01 DIAGNOSIS — Z98.891 HISTORY OF UTERINE SCAR FROM PREVIOUS SURGERY: Chronic | ICD-10-CM

## 2025-02-01 DIAGNOSIS — S09.91XA UNSPECIFIED INJURY OF EAR, INITIAL ENCOUNTER: Chronic | ICD-10-CM

## 2025-02-01 LAB
ALBUMIN SERPL ELPH-MCNC: 4.5 G/DL — SIGNIFICANT CHANGE UP (ref 3.3–5)
ALP SERPL-CCNC: 112 U/L — SIGNIFICANT CHANGE UP (ref 40–120)
ALT FLD-CCNC: 20 U/L — SIGNIFICANT CHANGE UP (ref 10–45)
ANION GAP SERPL CALC-SCNC: 15 MMOL/L — SIGNIFICANT CHANGE UP (ref 5–17)
AST SERPL-CCNC: 15 U/L — SIGNIFICANT CHANGE UP (ref 10–40)
BASOPHILS # BLD AUTO: 0.03 K/UL — SIGNIFICANT CHANGE UP (ref 0–0.2)
BASOPHILS NFR BLD AUTO: 0.2 % — SIGNIFICANT CHANGE UP (ref 0–2)
BILIRUB SERPL-MCNC: 0.3 MG/DL — SIGNIFICANT CHANGE UP (ref 0.2–1.2)
BUN SERPL-MCNC: 12 MG/DL — SIGNIFICANT CHANGE UP (ref 7–23)
CALCIUM SERPL-MCNC: 10.3 MG/DL — SIGNIFICANT CHANGE UP (ref 8.4–10.5)
CHLORIDE SERPL-SCNC: 97 MMOL/L — SIGNIFICANT CHANGE UP (ref 96–108)
CO2 SERPL-SCNC: 25 MMOL/L — SIGNIFICANT CHANGE UP (ref 22–31)
CREAT SERPL-MCNC: 0.53 MG/DL — SIGNIFICANT CHANGE UP (ref 0.5–1.3)
CRP SERPL-MCNC: 20 MG/L — HIGH (ref 0–4)
EGFR: 102 ML/MIN/1.73M2 — SIGNIFICANT CHANGE UP
EOSINOPHIL # BLD AUTO: 0.01 K/UL — SIGNIFICANT CHANGE UP (ref 0–0.5)
EOSINOPHIL NFR BLD AUTO: 0.1 % — SIGNIFICANT CHANGE UP (ref 0–6)
ERYTHROCYTE [SEDIMENTATION RATE] IN BLOOD: 52 MM/HR — HIGH (ref 0–20)
GLUCOSE SERPL-MCNC: 101 MG/DL — HIGH (ref 70–99)
HCT VFR BLD CALC: 35.9 % — SIGNIFICANT CHANGE UP (ref 34.5–45)
HGB BLD-MCNC: 11.6 G/DL — SIGNIFICANT CHANGE UP (ref 11.5–15.5)
IMM GRANULOCYTES NFR BLD AUTO: 0.5 % — SIGNIFICANT CHANGE UP (ref 0–0.9)
LYMPHOCYTES # BLD AUTO: 1.97 K/UL — SIGNIFICANT CHANGE UP (ref 1–3.3)
LYMPHOCYTES # BLD AUTO: 16.4 % — SIGNIFICANT CHANGE UP (ref 13–44)
MAGNESIUM SERPL-MCNC: 2.4 MG/DL — SIGNIFICANT CHANGE UP (ref 1.6–2.6)
MCHC RBC-ENTMCNC: 28.9 PG — SIGNIFICANT CHANGE UP (ref 27–34)
MCHC RBC-ENTMCNC: 32.3 G/DL — SIGNIFICANT CHANGE UP (ref 32–36)
MCV RBC AUTO: 89.5 FL — SIGNIFICANT CHANGE UP (ref 80–100)
MONOCYTES # BLD AUTO: 0.71 K/UL — SIGNIFICANT CHANGE UP (ref 0–0.9)
MONOCYTES NFR BLD AUTO: 5.9 % — SIGNIFICANT CHANGE UP (ref 2–14)
NEUTROPHILS # BLD AUTO: 9.25 K/UL — HIGH (ref 1.8–7.4)
NEUTROPHILS NFR BLD AUTO: 76.9 % — SIGNIFICANT CHANGE UP (ref 43–77)
NRBC # BLD: 0 /100 WBCS — SIGNIFICANT CHANGE UP (ref 0–0)
NRBC BLD-RTO: 0 /100 WBCS — SIGNIFICANT CHANGE UP (ref 0–0)
PHOSPHATE SERPL-MCNC: 2.8 MG/DL — SIGNIFICANT CHANGE UP (ref 2.5–4.5)
PLATELET # BLD AUTO: 412 K/UL — HIGH (ref 150–400)
POTASSIUM SERPL-MCNC: 4.1 MMOL/L — SIGNIFICANT CHANGE UP (ref 3.5–5.3)
POTASSIUM SERPL-SCNC: 4.1 MMOL/L — SIGNIFICANT CHANGE UP (ref 3.5–5.3)
PROT SERPL-MCNC: 8.4 G/DL — HIGH (ref 6–8.3)
RBC # BLD: 4.01 M/UL — SIGNIFICANT CHANGE UP (ref 3.8–5.2)
RBC # FLD: 12.9 % — SIGNIFICANT CHANGE UP (ref 10.3–14.5)
SODIUM SERPL-SCNC: 137 MMOL/L — SIGNIFICANT CHANGE UP (ref 135–145)
WBC # BLD: 12.03 K/UL — HIGH (ref 3.8–10.5)
WBC # FLD AUTO: 12.03 K/UL — HIGH (ref 3.8–10.5)

## 2025-02-01 PROCEDURE — 80053 COMPREHEN METABOLIC PANEL: CPT

## 2025-02-01 PROCEDURE — 72125 CT NECK SPINE W/O DYE: CPT | Mod: MC

## 2025-02-01 PROCEDURE — 85652 RBC SED RATE AUTOMATED: CPT

## 2025-02-01 PROCEDURE — 86140 C-REACTIVE PROTEIN: CPT

## 2025-02-01 PROCEDURE — 83735 ASSAY OF MAGNESIUM: CPT

## 2025-02-01 PROCEDURE — 99284 EMERGENCY DEPT VISIT MOD MDM: CPT | Mod: 25

## 2025-02-01 PROCEDURE — 85025 COMPLETE CBC W/AUTO DIFF WBC: CPT

## 2025-02-01 PROCEDURE — 72125 CT NECK SPINE W/O DYE: CPT | Mod: 26

## 2025-02-01 PROCEDURE — 84100 ASSAY OF PHOSPHORUS: CPT

## 2025-02-01 RX ORDER — LIDOCAINE HYDROCHLORIDE 30 MG/G
2 CREAM TOPICAL ONCE
Refills: 0 | Status: COMPLETED | OUTPATIENT
Start: 2025-02-01 | End: 2025-02-01

## 2025-02-01 RX ORDER — IBUPROFEN 600 MG/1
600 TABLET, FILM COATED ORAL ONCE
Refills: 0 | Status: DISCONTINUED | OUTPATIENT
Start: 2025-02-01 | End: 2025-02-01

## 2025-02-01 RX ORDER — IBUPROFEN 600 MG/1
600 TABLET, FILM COATED ORAL ONCE
Refills: 0 | Status: COMPLETED | OUTPATIENT
Start: 2025-02-01 | End: 2025-02-01

## 2025-02-01 RX ADMIN — Medication 5 MILLIGRAM(S): at 08:29

## 2025-02-01 RX ADMIN — IBUPROFEN 600 MILLIGRAM(S): 600 TABLET, FILM COATED ORAL at 11:10

## 2025-02-01 RX ADMIN — LIDOCAINE HYDROCHLORIDE 2 PATCH: 30 CREAM TOPICAL at 08:30

## 2025-02-01 RX ADMIN — IBUPROFEN 600 MILLIGRAM(S): 600 TABLET, FILM COATED ORAL at 10:13

## 2025-02-01 NOTE — ED PROVIDER NOTE - OBJECTIVE STATEMENT
64F PMHx chronic neck and back pain, hypothyroidism, PSHx cervical spine procedure over 8 years ago, presenting with neck pain. Per patient she has had chronic neck pain for several years. She had a procedure on her cervical spine for "twisted discs", per cervical spine xray in 2/2024 appears to be 3 level fusion with disc spacers. At this time patient is experiencing sharp, stabbing pain in posterior neck, radiating up back of her head and with occasional pain in her shoulders limiting movement. She states this has intermittently occurred for several years and has come to the ED for this issue before. Denies any trauma, abnormal movements, falls recently. She went to a chiropractor 3 days ago who performed a series of xrays showing "inflammation" per patient, was prescribed prednisone 50mg qd (taking for 3 days) and Tizanidine HCl 2mg qd PRN which have been unhelpful. Patient was asked multiple times if any manipulations/maneuvers/massage were performed by the chiropractor and she denied multiple times, denied any contact with the chiropractor. She has not taken any medications for pain at this time. She denies any new numbness, changes to vision, chest pain, shortness of breath, cough, N/V/Diarrhea/Constipation. Denies any weakness in extremities although movement is limited by pain.

## 2025-02-01 NOTE — ED PROVIDER NOTE - NSFOLLOWUPINSTRUCTIONS_ED_ALL_ED_FT
You were seen in the emergency department for neck pain. Based on your presentation, it is likely the neck pain is an exacerbation of your chronic neck pain. We collected labs and performed a CT scan of your neck to determine if there were any new ongoing issues requiring emergent intervention. Your labs were overall normal and your CT scan did not demonstrate any new issues requiring treatment at this time. However, based on your presentation, you would likely benefit from having an MRI performed to assess for other causes of your neck pain. Please follow up with a neurologist (listed below) for further work up and evaluation of your neck pain. At this time, we recommend using motrin and lidocaine patches for your neck pain, both of which are available as over-the-counter medications. You may take 200-400mg of motrin every 4-6 hours, or 600-800mg of motrin every 6-8 hours as needed for pain. If you find that you are taking motrin consistently throughout the day for over 1 week, please discuss with your primary medical doctor before continuing. If you develop any numbness or weakness in your arms, chest, face, or neck, please return to the emergency department for reevaluation. If you develop dark stools, lightheadedness, or dizziness, please return to the emergency department for reevaluation.

## 2025-02-01 NOTE — ED PROVIDER NOTE - IN ACCORDANCE WITH [place] STATE LAW, WE OFFER EVERY PATIENT A HEPATITIS C TEST. WOULD YOU LIKE TO BE TESTED TODAY?
Problem: Goal Outcome Summary  Goal: Goal Outcome Summary  Outcome: No Change     AVSS, afebrile. C/o right hip and leg pain, oxycodone given x 2, heat applied. Compazine given with pain meds. Ativan given x 1. Daughter at bedside. Cont POC.       Opt out

## 2025-02-01 NOTE — ED ADULT NURSE NOTE - CAS ELECT INFOMATION PROVIDED
What Type Of Note Output Would You Prefer (Optional)?: Standard Output Is The Patient Presenting As Previously Scheduled?: Yes How Severe Is Your Rash?: mild Is This A New Presentation, Or A Follow-Up?: Rash DC instructions

## 2025-02-01 NOTE — ED ADULT NURSE NOTE - NS ED NURSE DISCH DISPOSITION
"Procedure: abdominal wall reconstruction; open incisional hernia repair    Date: 1/31/2024    Surgeon: Dontae    Patient calling to discuss PT that Dr. Diggs is recommending. She reports her insurance will only cover \"rehab,\" not \"maintenance.\"    Informed her that the PT we order after surgery is for rehab purposes    She would like the referral faxed to [facility] in [place]    Will fax today    All questions answered and she will call PRCALISTA Joyce RN-BSN    " Discharged

## 2025-02-01 NOTE — ED ADULT NURSE NOTE - OBJECTIVE STATEMENT
64y F A&O x3 ambulatory, coming in from home, brother bedside. Presenting to ER with generalized neck pain which radiated to b/l shoulders or occipital region of head at intermittent times for the last 4 days. Hx of chronic neck pain and PSHx of cervical spinal fusion 8 years ago. Pt endorsing sharp, stabbing pain in posterior neck which limits her movement. Denies any trauma, abnormal movements, falls recently. Pt went to chiropractor 3 days ago  and have xrays performed revealing  "inflammation" according to pt. Pt was prescribed prednisone 50mg qd (taking for 3 days) and Tizanidine HCl 2mg qd PRN which have given her no relief. Pt denies any manipulations/maneuvers/massage were performed by the chiropractor. Denies any weakness to extremities, numbness, c/p, sob, n/v/d, vision changes.

## 2025-02-01 NOTE — ED PROVIDER NOTE - BIRTH SEX
Female
Please discuss your CT scan results with your primary care physician. You will need a non-emergent CT scan to evaluate incidental lung nodule that was found on your evaluation today.

## 2025-02-01 NOTE — ED PROVIDER NOTE - CLINICAL SUMMARY MEDICAL DECISION MAKING FREE TEXT BOX
See HPI. DDx likely exacerbation of chronic neck/upper back pain c/w history of intermittently worsening pain. Will obtain CBC, CMP, ESR, CRP, CT neck w/o contrast. Motrin 600, Flexeril 5, Lidocaine patches for pain control. 64 y old f with multiple medical issues ,anxiety and depression ,chronic neck pain presented with exacerbation of her old pain ,no new injuries  ,non focal neuro exam    DDx likely exacerbation of chronic neck/upper back pain c/w history of intermittently worsening pain. Will obtain CBC, CMP, ESR, CRP, CT neck w/o contrast. Motrin 600, Flexeril 5, Lidocaine patches for pain control. outpatient neuro follow up ZR

## 2025-02-01 NOTE — ED ADULT TRIAGE NOTE - NS ED NURSE BANDS TYPE
PT DAILY TREATMENT NOTE     Patient Name: Naty Almazan  Date:10/3/2022  : 1975  [x]  Patient  Verified  Payor: [address] / Plan: [facility] / Product Type: Managed Care Medicaid /    In time: 4:18 PM  Out time:4:59 PM  Total Treatment Time (min): 42  Visit #: 3 of 16    Treatment Area: Left leg pain [M79.605]    SUBJECTIVE  Pain Level (0-10 scale): 4  Any medication changes, allergies to medications, adverse drug reactions, diagnosis change, or new procedure performed?: [x] No    [] Yes (see summary sheet for update)  Subjective functional status/changes:   [] No changes reported  Feeling better today than last session. OBJECTIVE    20 min Therapeutic Exercise:  [x] See flow sheet :   Rationale: increase ROM and increase strength to improve the patients ability to perform ADLs. 10 min Therapeutic Activity:  [x]  See flow sheet :   Rationale: increase strength, improve coordination, and increase proprioception  to improve the patients ability to perform functional transfers. 12 min Neuromuscular Re-education:  [x]  See flow sheet :   Rationale: increase strength, improve coordination, and increase proprioception  to improve the patients ability to negotiate stairs with greater ease. With   [] TE   [x] TA   [] neuro   [] other: Patient Education: [] Review HEP    [] Progressed/Changed HEP based on:   [x] positioning   [x] body mechanics   [] transfers   [] heat/ice application    [] other:      Other Objective/Functional Measures:   Intervention per flowsheet     Pain Level (0-10 scale) post treatment: 4    ASSESSMENT/Changes in Function: Patient with improved performance today though requiring cues with step ups and down to maintain mechanics. Minimal hesitation noted with alec activity. Functional quad strength appears to be sufficient however patient reporting fear of buckling.      Patient will continue to benefit from skilled PT services to modify and progress therapeutic interventions, address functional mobility deficits, address ROM deficits, address strength deficits, analyze and address soft tissue restrictions, analyze and cue movement patterns, and analyze and modify body mechanics/ergonomics to attain remaining goals. Progress towards goals / Updated goals:  Short Term Goals: To be accomplished in 2 weeks:  Pt will report HEP compliance to maximize therapeutic outcome attainment. Eval: HEP assigned  Current: Met pt reports compliance 9/29/2022  Pt will demonstrate 0-120 deg left knee AROM to improve mobility with ambulation. Eval: 5-110 deg left knee  Long Term Goals: To be accomplished in 8 weeks:  Pt will improve her B knee strength to 4+/5 MMT to maximize stability with stair negotiation. Eval: 4-/5 right knee, 3+/5 left knee  Pt will improve her B hip ext/abd strength to 4/5 MMT to improve stability with ambulation. Eval: hip ext 3/5 right and 2+/5 left, hip abd 3+/5 right and 3/5 left  Pt will improve her left LE SLS to 30 seconds to improve stability in the stance phase of gait. Eval: 7 sec  Pt will improve her left ely stretch to 6 inches from heel to buttock to reduce anterior knee pain. Eval: ~ 90 deg flexion in prone  Pt will improve her FOTO to 56, demonstrating improved functional ADL capacity. Eval: 45  Pt will report ability to negotiate 1 flight of stairs reciprocally without instability to improve confidence with community ambulation.   Eval: step to and anxious    PLAN  [x]  Upgrade activities as tolerated     []  Continue plan of care  []  Update interventions per flow sheet       []  Discharge due to:_  []  Other:_      Kyra Cunningham, PT 10/3/2022  4:31 PM    Future Appointments   Date Time Provider Sol Ulrich   10/6/2022  3:45 PM [place], [address] [facility]   10/10/2022  1:15 PM Atilio Finch, PTA [facility] [place]   10/12/2022  4:30 PM Aminah Whiteside, PT [facility] [place]   10/17/2022  4:30 PM Morenita Saenz, [address] [facility]   10/19/2022  4:30 PM Elisabeth Henrik, PT [facility]   10/24/2022  4:30 PM Teryl Gess [facility]   10/26/2022  1:00 PM Teryl Gess [facility] Name band;

## 2025-02-01 NOTE — ED PROVIDER NOTE - PROGRESS NOTE DETAILS
Ramesh Sharif PGY1: reassessed patient, pain is greatly improved. Will discharge with neurology followup at this time.

## 2025-02-01 NOTE — ED ADULT NURSE REASSESSMENT NOTE - NS ED NURSE REASSESS RESPONSE TO CARE
Quality 431: Preventive Care And Screening: Unhealthy Alcohol Use - Screening: Patient not identified as an unhealthy alcohol user when screened for unhealthy alcohol use using a systematic screening method feeling better Detail Level: Detailed Quality 226: Preventive Care And Screening: Tobacco Use: Screening And Cessation Intervention: Patient screened for tobacco use and is an ex/non-smoker Quality 130: Documentation Of Current Medications In The Medical Record: Current Medications Documented

## 2025-02-01 NOTE — ED ADULT NURSE NOTE - NSFALLRISKINTERV_ED_ALL_ED

## 2025-02-01 NOTE — ED PROVIDER NOTE - PATIENT PORTAL LINK FT
You can access the FollowMyHealth Patient Portal offered by [facility] by registering at the following website: [email or web address]. By joining [facility]’s FollowMyHealth portal, you will also be able to view your health information using other applications (apps) compatible with our system.

## 2025-02-01 NOTE — ED PROVIDER NOTE - PHYSICAL EXAMINATION
GENERAL: NAD,sitting next to bed in discomfort, conversational  HEAD:  Atraumatic, normocephalic  EYES: EOMI, PERRLA, conjunctiva and sclera clear  NECK: Supple, trachea midline, no JVD. No swelling, no palpable lymph nodes.  HEART: +S1/S2, RRR  LUNGS: Unlabored respirations. CTA b/l  ABDOMEN: Soft, NTND, +BS  EXTREMITIES: 2+ peripheral pulses bilaterally  MSK: no gross deformity in extremities, no step off or deformity in C/T/L spine, normal muscle strength/tone but limited exam 2/2 pain  NERVOUS SYSTEM: CN II-XII intact, A&Ox3, moving all extremities spontaneously but limited by pain, no focal deficits, sensation intact and equal in b/l extremities.  5/5, shoulder flexion/extension 5/5, shoulder abduction/adduction 5/5, elbow flexion/extension 5/5.  SKIN: No rashes or lesions

## 2025-02-01 NOTE — ED ADULT NURSE NOTE - CAS TRG GEN SKIN COLOR
> 1 year since seen, needs appt. Refilled x 1 and Letter sent to notify of need to be seen. Sangita Chaparro RN       Normal for race

## 2025-02-24 ENCOUNTER — APPOINTMENT (OUTPATIENT)
Dept: INTERNAL MEDICINE | Facility: CLINIC | Age: 65
End: 2025-02-24

## 2025-02-24 ENCOUNTER — OUTPATIENT (OUTPATIENT)
Dept: OUTPATIENT SERVICES | Facility: HOSPITAL | Age: 65
LOS: 1 days | End: 2025-02-24
Payer: MEDICARE

## 2025-02-24 ENCOUNTER — MED ADMIN CHARGE (OUTPATIENT)
Age: 65
End: 2025-02-24

## 2025-02-24 ENCOUNTER — APPOINTMENT (OUTPATIENT)
Dept: INTERNAL MEDICINE | Facility: CLINIC | Age: 65
End: 2025-02-24
Payer: MEDICARE

## 2025-02-24 VITALS
OXYGEN SATURATION: 96 % | DIASTOLIC BLOOD PRESSURE: 80 MMHG | HEART RATE: 94 BPM | HEIGHT: 64 IN | WEIGHT: 146 LBS | BODY MASS INDEX: 24.92 KG/M2 | SYSTOLIC BLOOD PRESSURE: 140 MMHG

## 2025-02-24 DIAGNOSIS — M54.2 CERVICALGIA: ICD-10-CM

## 2025-02-24 DIAGNOSIS — Z98.891 HISTORY OF UTERINE SCAR FROM PREVIOUS SURGERY: Chronic | ICD-10-CM

## 2025-02-24 DIAGNOSIS — S09.91XA UNSPECIFIED INJURY OF EAR, INITIAL ENCOUNTER: Chronic | ICD-10-CM

## 2025-02-24 DIAGNOSIS — G89.29 CERVICALGIA: ICD-10-CM

## 2025-02-24 DIAGNOSIS — M54.12 RADICULOPATHY, CERVICAL REGION: ICD-10-CM

## 2025-02-24 DIAGNOSIS — M54.81 OCCIPITAL NEURALGIA: ICD-10-CM

## 2025-02-24 DIAGNOSIS — Z23 ENCOUNTER FOR IMMUNIZATION: ICD-10-CM

## 2025-02-24 DIAGNOSIS — I10 ESSENTIAL (PRIMARY) HYPERTENSION: ICD-10-CM

## 2025-02-24 PROCEDURE — G0439: CPT

## 2025-02-24 PROCEDURE — 85027 COMPLETE CBC AUTOMATED: CPT

## 2025-02-24 PROCEDURE — 90471 IMMUNIZATION ADMIN: CPT

## 2025-02-24 PROCEDURE — 80053 COMPREHEN METABOLIC PANEL: CPT

## 2025-02-24 PROCEDURE — 80061 LIPID PANEL: CPT

## 2025-02-24 PROCEDURE — T1013: CPT

## 2025-02-24 PROCEDURE — 90715 TDAP VACCINE 7 YRS/> IM: CPT

## 2025-02-28 LAB
ALBUMIN SERPL ELPH-MCNC: 4.4 G/DL
ALP BLD-CCNC: 141 U/L
ALT SERPL-CCNC: 21 U/L
ANION GAP SERPL CALC-SCNC: 12 MMOL/L
AST SERPL-CCNC: 21 U/L
BILIRUB SERPL-MCNC: 0.2 MG/DL
BUN SERPL-MCNC: 13 MG/DL
CALCIUM SERPL-MCNC: 10.2 MG/DL
CHLORIDE SERPL-SCNC: 103 MMOL/L
CHOLEST SERPL-MCNC: 140 MG/DL
CO2 SERPL-SCNC: 27 MMOL/L
CREAT SERPL-MCNC: 0.62 MG/DL
EGFR: 98 ML/MIN/1.73M2
GLUCOSE SERPL-MCNC: 96 MG/DL
HCT VFR BLD CALC: 34.9 %
HDLC SERPL-MCNC: 54 MG/DL
HGB BLD-MCNC: 11.4 G/DL
LDLC SERPL CALC-MCNC: 56 MG/DL
MCHC RBC-ENTMCNC: 29.2 PG
MCHC RBC-ENTMCNC: 32.7 G/DL
MCV RBC AUTO: 89.5 FL
NONHDLC SERPL-MCNC: 86 MG/DL
PLATELET # BLD AUTO: 623 K/UL
POTASSIUM SERPL-SCNC: 5.1 MMOL/L
PROT SERPL-MCNC: 8.6 G/DL
RBC # BLD: 3.9 M/UL
RBC # FLD: 13.4 %
SODIUM SERPL-SCNC: 143 MMOL/L
TRIGL SERPL-MCNC: 183 MG/DL
WBC # FLD AUTO: 5.62 K/UL

## 2025-03-10 DIAGNOSIS — M54.2 CERVICALGIA: ICD-10-CM

## 2025-03-10 DIAGNOSIS — Z00.00 ENCOUNTER FOR GENERAL ADULT MEDICAL EXAMINATION WITHOUT ABNORMAL FINDINGS: ICD-10-CM

## 2025-03-10 DIAGNOSIS — M54.81 OCCIPITAL NEURALGIA: ICD-10-CM

## 2025-03-10 DIAGNOSIS — Z23 ENCOUNTER FOR IMMUNIZATION: ICD-10-CM

## 2025-03-10 DIAGNOSIS — G89.29 OTHER CHRONIC PAIN: ICD-10-CM

## 2025-03-28 ENCOUNTER — NON-APPOINTMENT (OUTPATIENT)
Age: 65
End: 2025-03-28

## 2025-04-29 ENCOUNTER — NON-APPOINTMENT (OUTPATIENT)
Age: 65
End: 2025-04-29

## 2025-05-05 ENCOUNTER — APPOINTMENT (OUTPATIENT)
Dept: MRI IMAGING | Facility: CLINIC | Age: 65
End: 2025-05-05

## 2025-05-13 ENCOUNTER — APPOINTMENT (OUTPATIENT)
Dept: ORTHOPEDIC SURGERY | Facility: CLINIC | Age: 65
End: 2025-05-13
Payer: MEDICAID

## 2025-05-13 DIAGNOSIS — M17.12 UNILATERAL PRIMARY OSTEOARTHRITIS, LEFT KNEE: ICD-10-CM

## 2025-05-13 DIAGNOSIS — M17.11 UNILATERAL PRIMARY OSTEOARTHRITIS, RIGHT KNEE: ICD-10-CM

## 2025-05-13 PROCEDURE — 99204 OFFICE O/P NEW MOD 45 MIN: CPT | Mod: 25

## 2025-05-13 PROCEDURE — 20610 DRAIN/INJ JOINT/BURSA W/O US: CPT | Mod: LT

## 2025-05-13 PROCEDURE — 73564 X-RAY EXAM KNEE 4 OR MORE: CPT | Mod: LT,RT

## 2025-07-16 ENCOUNTER — APPOINTMENT (OUTPATIENT)
Dept: INTERNAL MEDICINE | Facility: CLINIC | Age: 65
End: 2025-07-16

## 2025-07-24 ENCOUNTER — OUTPATIENT (OUTPATIENT)
Dept: OUTPATIENT SERVICES | Facility: HOSPITAL | Age: 65
LOS: 1 days | End: 2025-07-24
Payer: MEDICARE

## 2025-07-24 ENCOUNTER — APPOINTMENT (OUTPATIENT)
Dept: INTERNAL MEDICINE | Facility: CLINIC | Age: 65
End: 2025-07-24
Payer: MEDICARE

## 2025-07-24 VITALS
SYSTOLIC BLOOD PRESSURE: 118 MMHG | DIASTOLIC BLOOD PRESSURE: 82 MMHG | BODY MASS INDEX: 24.75 KG/M2 | HEIGHT: 64 IN | OXYGEN SATURATION: 98 % | HEART RATE: 68 BPM | WEIGHT: 145 LBS

## 2025-07-24 DIAGNOSIS — R73.03 PREDIABETES.: ICD-10-CM

## 2025-07-24 DIAGNOSIS — M25.561 PAIN IN RIGHT KNEE: ICD-10-CM

## 2025-07-24 DIAGNOSIS — M54.2 CERVICALGIA: ICD-10-CM

## 2025-07-24 DIAGNOSIS — I10 ESSENTIAL (PRIMARY) HYPERTENSION: ICD-10-CM

## 2025-07-24 DIAGNOSIS — S09.91XA UNSPECIFIED INJURY OF EAR, INITIAL ENCOUNTER: Chronic | ICD-10-CM

## 2025-07-24 DIAGNOSIS — G89.29 CERVICALGIA: ICD-10-CM

## 2025-07-24 DIAGNOSIS — Z98.891 HISTORY OF UTERINE SCAR FROM PREVIOUS SURGERY: Chronic | ICD-10-CM

## 2025-07-24 DIAGNOSIS — E03.9 HYPOTHYROIDISM, UNSPECIFIED: ICD-10-CM

## 2025-07-24 PROCEDURE — G0463: CPT

## 2025-07-24 PROCEDURE — 99213 OFFICE O/P EST LOW 20 MIN: CPT | Mod: GE

## 2025-07-29 DIAGNOSIS — M25.561 PAIN IN RIGHT KNEE: ICD-10-CM

## 2025-07-29 DIAGNOSIS — M54.2 CERVICALGIA: ICD-10-CM

## 2025-07-29 DIAGNOSIS — E03.9 HYPOTHYROIDISM, UNSPECIFIED: ICD-10-CM

## 2025-07-29 DIAGNOSIS — R73.03 PREDIABETES: ICD-10-CM

## 2025-07-29 DIAGNOSIS — G89.29 OTHER CHRONIC PAIN: ICD-10-CM

## 2025-08-11 ENCOUNTER — APPOINTMENT (OUTPATIENT)
Dept: ORTHOPEDIC SURGERY | Facility: CLINIC | Age: 65
End: 2025-08-11
Payer: MEDICAID

## 2025-08-11 DIAGNOSIS — M17.12 UNILATERAL PRIMARY OSTEOARTHRITIS, LEFT KNEE: ICD-10-CM

## 2025-08-11 DIAGNOSIS — M17.11 UNILATERAL PRIMARY OSTEOARTHRITIS, RIGHT KNEE: ICD-10-CM

## 2025-08-11 PROCEDURE — 99214 OFFICE O/P EST MOD 30 MIN: CPT

## 2025-08-11 RX ORDER — MELOXICAM 15 MG/1
15 TABLET ORAL
Qty: 60 | Refills: 1 | Status: ACTIVE | COMMUNITY
Start: 2025-08-11 | End: 1900-01-01